# Patient Record
Sex: FEMALE | Race: WHITE | NOT HISPANIC OR LATINO | Employment: OTHER | URBAN - METROPOLITAN AREA
[De-identification: names, ages, dates, MRNs, and addresses within clinical notes are randomized per-mention and may not be internally consistent; named-entity substitution may affect disease eponyms.]

---

## 2017-03-08 ENCOUNTER — OFFICE VISIT - RIVER FALLS (OUTPATIENT)
Dept: FAMILY MEDICINE | Facility: CLINIC | Age: 67
End: 2017-03-08

## 2017-03-08 ASSESSMENT — MIFFLIN-ST. JEOR: SCORE: 1216.24

## 2017-07-11 ENCOUNTER — OFFICE VISIT - RIVER FALLS (OUTPATIENT)
Dept: FAMILY MEDICINE | Facility: CLINIC | Age: 67
End: 2017-07-11

## 2017-07-11 ASSESSMENT — MIFFLIN-ST. JEOR: SCORE: 1199.91

## 2017-07-12 LAB
CHOLEST SERPL-MCNC: 241 MG/DL (ref 125–200)
CHOLEST/HDLC SERPL: 3.8 {RATIO}
CREAT SERPL-MCNC: 1.07 MG/DL (ref 0.5–0.99)
GLUCOSE BLD-MCNC: 88 MG/DL (ref 65–99)
HDLC SERPL-MCNC: 64 MG/DL
LDLC SERPL CALC-MCNC: 149 MG/DL
NONHDLC SERPL-MCNC: 177 MG/DL
TRIGL SERPL-MCNC: 139 MG/DL

## 2017-07-18 ENCOUNTER — OFFICE VISIT - RIVER FALLS (OUTPATIENT)
Dept: FAMILY MEDICINE | Facility: CLINIC | Age: 67
End: 2017-07-18

## 2018-03-19 ENCOUNTER — OFFICE VISIT - RIVER FALLS (OUTPATIENT)
Dept: FAMILY MEDICINE | Facility: CLINIC | Age: 68
End: 2018-03-19

## 2018-03-19 ASSESSMENT — MIFFLIN-ST. JEOR: SCORE: 1241.06

## 2018-07-27 ENCOUNTER — AMBULATORY - RIVER FALLS (OUTPATIENT)
Dept: FAMILY MEDICINE | Facility: CLINIC | Age: 68
End: 2018-07-27

## 2018-07-28 LAB
CHOLEST SERPL-MCNC: 261 MG/DL
CHOLEST/HDLC SERPL: 3.5 {RATIO}
CREAT SERPL-MCNC: 1.04 MG/DL (ref 0.5–0.99)
GLUCOSE BLD-MCNC: 91 MG/DL (ref 65–99)
HDLC SERPL-MCNC: 75 MG/DL
LDLC SERPL CALC-MCNC: 164 MG/DL
NONHDLC SERPL-MCNC: 186 MG/DL
TRIGL SERPL-MCNC: 103 MG/DL

## 2018-12-17 ENCOUNTER — OFFICE VISIT - RIVER FALLS (OUTPATIENT)
Dept: FAMILY MEDICINE | Facility: CLINIC | Age: 68
End: 2018-12-17

## 2018-12-17 ASSESSMENT — MIFFLIN-ST. JEOR: SCORE: 1261.02

## 2019-03-20 ENCOUNTER — OFFICE VISIT - RIVER FALLS (OUTPATIENT)
Dept: FAMILY MEDICINE | Facility: CLINIC | Age: 69
End: 2019-03-20

## 2019-03-20 ASSESSMENT — MIFFLIN-ST. JEOR: SCORE: 1270.67

## 2019-03-21 LAB
BUN SERPL-MCNC: 19 MG/DL (ref 7–25)
BUN/CREAT RATIO - HISTORICAL: NORMAL (ref 6–22)
CALCIUM SERPL-MCNC: 9.5 MG/DL (ref 8.6–10.4)
CHLORIDE BLD-SCNC: 100 MMOL/L (ref 98–110)
CO2 SERPL-SCNC: 26 MMOL/L (ref 20–32)
CREAT SERPL-MCNC: 0.91 MG/DL (ref 0.5–0.99)
EGFRCR SERPLBLD CKD-EPI 2021: 65 ML/MIN/1.73M2
ERYTHROCYTE [DISTWIDTH] IN BLOOD BY AUTOMATED COUNT: 12.5 % (ref 11–15)
GLUCOSE BLD-MCNC: 92 MG/DL (ref 65–99)
HCT VFR BLD AUTO: 40.6 % (ref 35–45)
HGB BLD-MCNC: 13.7 GM/DL (ref 11.7–15.5)
MCH RBC QN AUTO: 30.4 PG (ref 27–33)
MCHC RBC AUTO-ENTMCNC: 33.7 GM/DL (ref 32–36)
MCV RBC AUTO: 90.2 FL (ref 80–100)
PLATELET # BLD AUTO: 355 10*3/UL (ref 140–400)
PMV BLD: 9.7 FL (ref 7.5–12.5)
POTASSIUM BLD-SCNC: 4.2 MMOL/L (ref 3.5–5.3)
RBC # BLD AUTO: 4.5 10*6/UL (ref 3.8–5.1)
SODIUM SERPL-SCNC: 135 MMOL/L (ref 135–146)
WBC # BLD AUTO: 6.7 10*3/UL (ref 3.8–10.8)

## 2019-07-01 ENCOUNTER — OFFICE VISIT - RIVER FALLS (OUTPATIENT)
Dept: FAMILY MEDICINE | Facility: CLINIC | Age: 69
End: 2019-07-01

## 2019-07-01 ASSESSMENT — MIFFLIN-ST. JEOR: SCORE: 1277.93

## 2019-07-03 ENCOUNTER — COMMUNICATION - RIVER FALLS (OUTPATIENT)
Dept: FAMILY MEDICINE | Facility: CLINIC | Age: 69
End: 2019-07-03

## 2019-11-15 ENCOUNTER — AMBULATORY - RIVER FALLS (OUTPATIENT)
Dept: FAMILY MEDICINE | Facility: CLINIC | Age: 69
End: 2019-11-15

## 2019-11-18 LAB
25(OH)D3 SERPL-MCNC: 45 NG/ML (ref 30–100)
BUN SERPL-MCNC: 15 MG/DL (ref 7–25)
BUN/CREAT RATIO - HISTORICAL: ABNORMAL (ref 6–22)
CALCIUM SERPL-MCNC: 9.6 MG/DL (ref 8.6–10.4)
CALCIUM SERPL-MCNC: 9.6 MG/DL (ref 8.6–10.4)
CHLORIDE BLD-SCNC: 103 MMOL/L (ref 98–110)
CO2 SERPL-SCNC: 28 MMOL/L (ref 20–32)
CREAT SERPL-MCNC: 0.99 MG/DL (ref 0.5–0.99)
EGFRCR SERPLBLD CKD-EPI 2021: 58 ML/MIN/1.73M2
GLUCOSE BLD-MCNC: 95 MG/DL (ref 65–99)
MAGNESIUM SERPL-MCNC: 1.9 MG/DL (ref 1.5–2.5)
PHOSPHATE SERPL-MCNC: 3.6 MG/DL (ref 2.1–4.3)
POTASSIUM BLD-SCNC: 4.4 MMOL/L (ref 3.5–5.3)
PTH-INTACT SERPL-MCNC: 44 PG/ML (ref 14–64)
SODIUM SERPL-SCNC: 140 MMOL/L (ref 135–146)

## 2020-06-17 ENCOUNTER — OFFICE VISIT - RIVER FALLS (OUTPATIENT)
Dept: FAMILY MEDICINE | Facility: CLINIC | Age: 70
End: 2020-06-17

## 2020-09-23 ENCOUNTER — COMMUNICATION - RIVER FALLS (OUTPATIENT)
Dept: FAMILY MEDICINE | Facility: CLINIC | Age: 70
End: 2020-09-23

## 2020-10-27 ENCOUNTER — COMMUNICATION - RIVER FALLS (OUTPATIENT)
Dept: FAMILY MEDICINE | Facility: CLINIC | Age: 70
End: 2020-10-27

## 2020-12-09 ENCOUNTER — COMMUNICATION - RIVER FALLS (OUTPATIENT)
Dept: FAMILY MEDICINE | Facility: CLINIC | Age: 70
End: 2020-12-09

## 2020-12-16 ENCOUNTER — OFFICE VISIT - RIVER FALLS (OUTPATIENT)
Dept: FAMILY MEDICINE | Facility: CLINIC | Age: 70
End: 2020-12-16

## 2020-12-16 ASSESSMENT — MIFFLIN-ST. JEOR: SCORE: 1287

## 2020-12-28 ENCOUNTER — COMMUNICATION - RIVER FALLS (OUTPATIENT)
Dept: FAMILY MEDICINE | Facility: CLINIC | Age: 70
End: 2020-12-28

## 2021-01-13 ENCOUNTER — COMMUNICATION - RIVER FALLS (OUTPATIENT)
Dept: FAMILY MEDICINE | Facility: CLINIC | Age: 71
End: 2021-01-13

## 2021-01-28 ENCOUNTER — COMMUNICATION - RIVER FALLS (OUTPATIENT)
Dept: FAMILY MEDICINE | Facility: CLINIC | Age: 71
End: 2021-01-28

## 2021-02-24 ENCOUNTER — COMMUNICATION - RIVER FALLS (OUTPATIENT)
Dept: FAMILY MEDICINE | Facility: CLINIC | Age: 71
End: 2021-02-24

## 2021-03-10 ENCOUNTER — COMMUNICATION - RIVER FALLS (OUTPATIENT)
Dept: FAMILY MEDICINE | Facility: CLINIC | Age: 71
End: 2021-03-10

## 2021-05-04 ENCOUNTER — COMMUNICATION - RIVER FALLS (OUTPATIENT)
Dept: FAMILY MEDICINE | Facility: CLINIC | Age: 71
End: 2021-05-04

## 2021-05-13 ENCOUNTER — OFFICE VISIT - RIVER FALLS (OUTPATIENT)
Dept: FAMILY MEDICINE | Facility: CLINIC | Age: 71
End: 2021-05-13

## 2021-05-13 ASSESSMENT — MIFFLIN-ST. JEOR: SCORE: 1277.93

## 2021-05-14 ENCOUNTER — COMMUNICATION - RIVER FALLS (OUTPATIENT)
Dept: FAMILY MEDICINE | Facility: CLINIC | Age: 71
End: 2021-05-14

## 2021-05-14 LAB
A/G RATIO - HISTORICAL: 1.5 (ref 1–2.5)
ALBUMIN SERPL-MCNC: 3.8 GM/DL (ref 3.6–5.1)
ALP SERPL-CCNC: 72 UNIT/L (ref 37–153)
ALT SERPL W P-5'-P-CCNC: 11 UNIT/L (ref 6–29)
AST SERPL W P-5'-P-CCNC: 19 UNIT/L (ref 10–35)
BILIRUB SERPL-MCNC: 0.7 MG/DL (ref 0.2–1.2)
BUN SERPL-MCNC: 18 MG/DL (ref 7–25)
BUN/CREAT RATIO - HISTORICAL: 16 (ref 6–22)
CALCIUM SERPL-MCNC: 9.5 MG/DL (ref 8.6–10.4)
CHLORIDE BLD-SCNC: 100 MMOL/L (ref 98–110)
CO2 SERPL-SCNC: 27 MMOL/L (ref 20–32)
CREAT SERPL-MCNC: 1.11 MG/DL (ref 0.6–0.93)
EGFRCR SERPLBLD CKD-EPI 2021: 50 ML/MIN/1.73M2
ERYTHROCYTE [DISTWIDTH] IN BLOOD BY AUTOMATED COUNT: 13.1 % (ref 11–15)
ERYTHROCYTE [SEDIMENTATION RATE] IN BLOOD BY WESTERGREN METHOD: 6 MM/H
GLOBULIN: 2.5 (ref 1.9–3.7)
GLUCOSE BLD-MCNC: 87 MG/DL (ref 65–99)
HCT VFR BLD AUTO: 40.9 % (ref 35–45)
HGB BLD-MCNC: 14 GM/DL (ref 11.7–15.5)
IRON SATURATION: 45 (ref 16–45)
IRON SERPL-MCNC: 133 MCG/DL (ref 45–160)
MCH RBC QN AUTO: 31.4 PG (ref 27–33)
MCHC RBC AUTO-ENTMCNC: 34.2 GM/DL (ref 32–36)
MCV RBC AUTO: 91.7 FL (ref 80–100)
PLATELET # BLD AUTO: 400 10*3/UL (ref 140–400)
PMV BLD: 8.9 FL (ref 7.5–12.5)
POTASSIUM BLD-SCNC: 4.4 MMOL/L (ref 3.5–5.3)
PROT SERPL-MCNC: 6.3 GM/DL (ref 6.1–8.1)
RBC # BLD AUTO: 4.46 10*6/UL (ref 3.8–5.1)
SODIUM SERPL-SCNC: 137 MMOL/L (ref 135–146)
TIBC - QUEST: 298 (ref 250–450)
TSH SERPL DL<=0.005 MIU/L-ACNC: 1.93 MIU/L (ref 0.4–4.5)
WBC # BLD AUTO: 7.7 10*3/UL (ref 3.8–10.8)

## 2021-06-03 ENCOUNTER — COMMUNICATION - RIVER FALLS (OUTPATIENT)
Dept: FAMILY MEDICINE | Facility: CLINIC | Age: 71
End: 2021-06-03

## 2021-07-01 ENCOUNTER — OFFICE VISIT - RIVER FALLS (OUTPATIENT)
Dept: FAMILY MEDICINE | Facility: CLINIC | Age: 71
End: 2021-07-01

## 2021-07-01 ENCOUNTER — TRANSFERRED RECORDS (OUTPATIENT)
Dept: MULTI SPECIALTY CLINIC | Facility: CLINIC | Age: 71
End: 2021-07-01
Payer: MEDICARE

## 2021-07-01 ASSESSMENT — MIFFLIN-ST. JEOR: SCORE: 1288.81

## 2021-08-18 ENCOUNTER — COMMUNICATION - RIVER FALLS (OUTPATIENT)
Dept: FAMILY MEDICINE | Facility: CLINIC | Age: 71
End: 2021-08-18

## 2022-02-09 ENCOUNTER — OFFICE VISIT - RIVER FALLS (OUTPATIENT)
Dept: FAMILY MEDICINE | Facility: CLINIC | Age: 72
End: 2022-02-09
Payer: MEDICARE

## 2022-02-10 ENCOUNTER — COMMUNICATION - RIVER FALLS (OUTPATIENT)
Dept: FAMILY MEDICINE | Facility: CLINIC | Age: 72
End: 2022-02-10
Payer: MEDICARE

## 2022-02-10 LAB
BASOPHILS # BLD MANUAL: 19 10*3/UL (ref 0–200)
BASOPHILS NFR BLD MANUAL: 0.3 %
BUN SERPL-MCNC: 18 MG/DL (ref 7–25)
BUN/CREAT RATIO - HISTORICAL: 16 (ref 6–22)
CALCIUM SERPL-MCNC: 10 MG/DL (ref 8.6–10.4)
CHLORIDE BLD-SCNC: 99 MMOL/L (ref 98–110)
CO2 SERPL-SCNC: 27 MMOL/L (ref 20–32)
CREAT SERPL-MCNC: 1.14 MG/DL (ref 0.6–0.93)
EGFRCR SERPLBLD CKD-EPI 2021: 48 ML/MIN/1.73M2
EOSINOPHIL # BLD MANUAL: 38 10*3/UL (ref 15–500)
EOSINOPHIL NFR BLD MANUAL: 0.6 %
ERYTHROCYTE [DISTWIDTH] IN BLOOD BY AUTOMATED COUNT: 12.8 % (ref 11–15)
GLUCOSE BLD-MCNC: 100 MG/DL (ref 65–99)
HCT VFR BLD AUTO: 41 % (ref 35–45)
HGB BLD-MCNC: 13.8 GM/DL (ref 11.7–15.5)
LYMPHOCYTES # BLD MANUAL: 2211 10*3/UL (ref 850–3900)
LYMPHOCYTES NFR BLD MANUAL: 35.1 %
MCH RBC QN AUTO: 31.2 PG (ref 27–33)
MCHC RBC AUTO-ENTMCNC: 33.7 GM/DL (ref 32–36)
MCV RBC AUTO: 92.6 FL (ref 80–100)
MONOCYTES # BLD MANUAL: 510 10*3/UL (ref 200–950)
MONOCYTES NFR BLD MANUAL: 8.1 %
NEUTROPHILS # BLD MANUAL: 3522 10*3/UL (ref 1500–7800)
NEUTROPHILS NFR BLD MANUAL: 55.9 %
PLATELET # BLD AUTO: 389 10*3/UL (ref 140–400)
PMV BLD: 9.2 FL (ref 7.5–12.5)
POTASSIUM BLD-SCNC: 3.9 MMOL/L (ref 3.5–5.3)
RBC # BLD AUTO: 4.43 10*6/UL (ref 3.8–5.1)
SODIUM SERPL-SCNC: 136 MMOL/L (ref 135–146)
WBC # BLD AUTO: 6.3 10*3/UL (ref 3.8–10.8)

## 2022-02-11 VITALS
HEART RATE: 68 BPM | HEIGHT: 61 IN | SYSTOLIC BLOOD PRESSURE: 138 MMHG | OXYGEN SATURATION: 100 % | BODY MASS INDEX: 34.36 KG/M2 | TEMPERATURE: 97.4 F | WEIGHT: 182 LBS | DIASTOLIC BLOOD PRESSURE: 75 MMHG

## 2022-02-11 VITALS
DIASTOLIC BLOOD PRESSURE: 72 MMHG | TEMPERATURE: 97.9 F | SYSTOLIC BLOOD PRESSURE: 130 MMHG | HEART RATE: 68 BPM | BODY MASS INDEX: 31.44 KG/M2 | WEIGHT: 166.4 LBS

## 2022-02-11 VITALS
HEART RATE: 66 BPM | DIASTOLIC BLOOD PRESSURE: 72 MMHG | HEIGHT: 61 IN | HEART RATE: 64 BPM | BODY MASS INDEX: 34.36 KG/M2 | HEIGHT: 61 IN | TEMPERATURE: 96.9 F | BODY MASS INDEX: 34.06 KG/M2 | WEIGHT: 180.4 LBS | DIASTOLIC BLOOD PRESSURE: 84 MMHG | SYSTOLIC BLOOD PRESSURE: 150 MMHG | TEMPERATURE: 98.7 F | WEIGHT: 182 LBS | SYSTOLIC BLOOD PRESSURE: 132 MMHG

## 2022-02-11 VITALS
DIASTOLIC BLOOD PRESSURE: 66 MMHG | HEIGHT: 61 IN | HEART RATE: 72 BPM | TEMPERATURE: 98.1 F | SYSTOLIC BLOOD PRESSURE: 122 MMHG | BODY MASS INDEX: 32.66 KG/M2 | WEIGHT: 173 LBS

## 2022-02-11 VITALS
SYSTOLIC BLOOD PRESSURE: 112 MMHG | BODY MASS INDEX: 31.11 KG/M2 | TEMPERATURE: 98.6 F | HEIGHT: 61 IN | HEART RATE: 67 BPM | OXYGEN SATURATION: 96 % | WEIGHT: 164.8 LBS | DIASTOLIC BLOOD PRESSURE: 74 MMHG

## 2022-02-11 VITALS
HEIGHT: 61 IN | WEIGHT: 168.4 LBS | TEMPERATURE: 97.3 F | BODY MASS INDEX: 31.79 KG/M2 | SYSTOLIC BLOOD PRESSURE: 134 MMHG | HEART RATE: 64 BPM | DIASTOLIC BLOOD PRESSURE: 78 MMHG

## 2022-02-11 VITALS
HEART RATE: 67 BPM | WEIGHT: 184.4 LBS | HEIGHT: 61 IN | SYSTOLIC BLOOD PRESSURE: 104 MMHG | DIASTOLIC BLOOD PRESSURE: 60 MMHG | RESPIRATION RATE: 16 BRPM | BODY MASS INDEX: 34.81 KG/M2 | OXYGEN SATURATION: 99 %

## 2022-02-11 VITALS
HEART RATE: 70 BPM | HEIGHT: 61 IN | TEMPERATURE: 97.6 F | BODY MASS INDEX: 33.49 KG/M2 | WEIGHT: 177.4 LBS | SYSTOLIC BLOOD PRESSURE: 120 MMHG | DIASTOLIC BLOOD PRESSURE: 70 MMHG

## 2022-02-11 VITALS — OXYGEN SATURATION: 95 % | DIASTOLIC BLOOD PRESSURE: 72 MMHG | HEART RATE: 66 BPM | SYSTOLIC BLOOD PRESSURE: 130 MMHG

## 2022-02-11 VITALS
OXYGEN SATURATION: 97 % | HEIGHT: 61 IN | SYSTOLIC BLOOD PRESSURE: 112 MMHG | TEMPERATURE: 97.9 F | BODY MASS INDEX: 34.74 KG/M2 | DIASTOLIC BLOOD PRESSURE: 60 MMHG | HEART RATE: 72 BPM | WEIGHT: 184 LBS

## 2022-02-14 ENCOUNTER — COMMUNICATION - RIVER FALLS (OUTPATIENT)
Dept: FAMILY MEDICINE | Facility: CLINIC | Age: 72
End: 2022-02-14
Payer: MEDICARE

## 2022-02-16 NOTE — NURSING NOTE
Comprehensive Intake Entered On:  6/20/2019 4:32 PM CDT    Performed On:  6/20/2019 4:32 PM CDT by Crissy Forrest CMA               Summary   Chief Complaint :   BP check    Advance Directive :   Yes   Menstrual Status :   Postmenopausal   Systolic Blood Pressure :   132 mmHg (HI)    Diastolic Blood Pressure :   72 mmHg   Mean Arterial Pressure :   92 mmHg   Race :      Languages :   English   Ethnicity :   Not  or    Crissy Forrest CMA - 6/20/2019 4:32 PM CDT

## 2022-02-16 NOTE — NURSING NOTE
Depression Screening Entered On:  12/16/2020 4:06 PM CST    Performed On:  12/16/2020 4:05 PM CST by Randi Finch CMA               Depression Screening   Little Interest - Pleasure in Activities :   Nearly every day   Feeling Down, Depressed, Hopeless :   More than half the days   Initial Depression Screen Score :   5 Score   Poor Appetite or Overeating :   Nearly every day   Trouble Falling or Staying Asleep :   Nearly every day   Feeling Tired or Little Energy :   Nearly every day   Feeling Bad About Yourself :   Nearly every day   Trouble Concentrating :   Several days   Moving or Speaking Slowly :   Not at all   Thoughts Better Off Dead or Hurting Self :   Not at all   MIGUELITO Difficulty with Work, Home, Others :   Very difficult   Detailed Depression Screen Score :   13    Total Depression Screen Score :   18    Randi Finch CMA - 12/16/2020 4:05 PM CST

## 2022-02-16 NOTE — TELEPHONE ENCOUNTER
---------------------  From: Bree Flores (Phone Messages Pool ([id number]_[place]))   To: [employer] Message Pool ([id number]_[facility][note 3]);     Sent: 12/9/2020 1:05:45 PM CST  Subject: FW: Prosthetic hand           ---------------------  From: DEANN KRISHNA  To: [facility]  Sent: 12/09/2020 12:56 p.m. CST  Subject: Prosthetic hand  Dr Blossom Greenfield    I saw the prosthetic tech yesterday in [place], [place]. He said my insurance requires a face-to-face visit with my regular Doctor to establish medical need for upper extremity prosthesis, then there is 11 questions you have to assess then fax them the medical record visit dictation (not a letter) that contains the assessment results and prescription. Having no hand didn t work. Do you think this is doable? Questions look dumb to me. Anyway, thoughts on this matter would be appreciated.  Thank you!!  Deann Hernandez can defiantly help with this, Deann. Just schedule an appointment and we will get this taken care of!    Randi---------------------  From: Randi Finch CMA ([employer] Message Pool ([id number]_[facility][note 3]))   To: DEANN KRISHNA    Sent: 12/9/2020 1:07:49 PM CST  Subject: RE: Prosthetic hand

## 2022-02-16 NOTE — TELEPHONE ENCOUNTER
---------------------  From: Celina Sun CMA (Phone Messages Pool ([id number]_[place]))   To: [employer] Message Pool ([id number]_[facility] [place]);     Sent: 6/3/2021 12:32:18 PM CDT  Subject: FW: Prescriptions     It appears sertraline was sent. Pt was not specific in initial request that she also needed hydroxyzine 25mg. Please advise if [employer] approves.    Last filled 5/13/21 #30 prn itching      ---------------------  From: MITCHELL KRISHNA  To: [facility]  Sent: 06/03/2021 12:21 p.m. CDT  Subject: Prescriptions  7 prescriptions were filled but 2 weren t. Sertraline and Hydroxyzine. Is there a problem with them?    Thank youCorrection: #40 sent on 5/13Hi Mitchell,     We did refill the Sertraline so you may need to double check with your pharmacy about that. As far as the Hydroxyzine-that is just an as needed medicine for your itching. Do you still feel like you need this? Is the itching any better?---------------------  From: Randi Finch CMA ([employer] Message Pool ([id number]_[facility] [place]))   To: MITCHELL KRISHNA    Sent: 6/3/2021 1:36:41 PM CDT  Subject: RE: Prescriptions

## 2022-02-16 NOTE — TELEPHONE ENCOUNTER
Order is sent to [facility] and they will contact patient.  Patient informed.

## 2022-02-16 NOTE — NURSING NOTE
Comprehensive Intake Entered On:  5/13/2021 2:08 PM CDT    Performed On:  5/13/2021 2:04 PM CDT by Randi Finch CMA               Summary   Chief Complaint :   1. Itching/all over body-prednisone did help. 2. Discuss vertigo and blurry vision.    Advance Directive :   Yes   Menstrual Status :   Postmenopausal   Weight Measured :   182 lb(Converted to: 182 lb 0 oz, 82.554 kg)    Height Measured :   61 in(Converted to: 5 ft 1 in, 154.94 cm)    Body Mass Index :   34.38 kg/m2 (HI)    Body Surface Area :   1.88 m2   Systolic Blood Pressure :   138 mmHg (HI)    Diastolic Blood Pressure :   75 mmHg   Mean Arterial Pressure :   96 mmHg   Peripheral Pulse Rate :   68 bpm   BP Site :   Right arm   BP Method :   Electronic   Temperature Tympanic :   97.4 DegF(Converted to: 36.3 DegC)  (LOW)    Oxygen Saturation :   100 %   Race :      Languages :   English   Ethnicity :   Not  or    Randi Finch CMA - 5/13/2021 2:04 PM CDT   Health Status   Allergies Verified? :   Yes   Medication History Verified? :   Yes   Immunizations Current :   Yes   Pre-Visit Planning Status :   Completed   Tobacco Use? :   Former smoker   Randi Finch CMA - 5/13/2021 2:04 PM CDT   Consents   Consent for Immunization Exchange :   Consent Granted   Consent for Immunizations to Providers :   Consent Granted   Randi Finch CMA - 5/13/2021 2:04 PM CDT   Meds / Allergies   (As Of: 5/13/2021 2:08:47 PM CDT)   Allergies (Active)   Dust  Estimated Onset Date:   Unspecified ; Created By:   Petrona Kaplan; Reaction Status:   Active ; Category:   Environment ; Substance:   Dust ; Type:   Allergy ; Updated By:   Petrona Kaplan; Reviewed Date:   1/5/2021 2:12 PM CST      Xarelto  Estimated Onset Date:   Unspecified ; Reactions:   Itching ; Created By:   Crystal Simeon LPN; Reaction Status:   Active ; Category:   Drug ; Substance:   Xarelto ; Type:   Allergy ; Updated By:   Crystal Simeon LPN; Reviewed Date:   12/16/2020 2:07 PM CST         Medication List   (As Of: 5/13/2021 2:08:47 PM CDT)   Prescription/Discharge Order    amLODIPine  :   amLODIPine ; Status:   Prescribed ; Ordered As Mnemonic:   amLODIPine 2.5 mg oral tablet ; Simple Display Line:   1 tab(s), Oral, daily, 90 tab(s), 3 Refill(s) ; Ordering Provider:   Blossom Ramirez MD; Catalog Code:   amLODIPine ; Order Dt/Tm:   6/17/2020 1:07:42 PM CDT          amoxicillin  :   amoxicillin ; Status:   Prescribed ; Ordered As Mnemonic:   amoxicillin 500 mg oral capsule ; Simple Display Line:   2,000 mg, 4 cap(s), Oral, once, given 1 hour prior to the procedure, 4 cap(s), 4 Refill(s) ; Ordering Provider:   Blossom Ramirez MD; Catalog Code:   amoxicillin ; Order Dt/Tm:   6/17/2020 1:11:53 PM CDT          calcium-vitamin D  :   calcium-vitamin D ; Status:   Prescribed ; Ordered As Mnemonic:   Calcium 600+D 600 mg-200 intl units oral tablet ; Simple Display Line:   1 tab(s), PO, qam, 90 tab(s) ; Ordering Provider:   Juli Cain MD; Catalog Code:   calcium-vitamin D ; Order Dt/Tm:   4/2/2014 5:52:48 PM CDT          cholecalciferol  :   cholecalciferol ; Status:   Prescribed ; Ordered As Mnemonic:   Vitamin D3 1000 intl units oral tablet ; Simple Display Line:   1,000 International Unit, 1 tab(s), po, daily, 90 tab(s) ; Ordering Provider:   Juli Cain MD; Catalog Code:   cholecalciferol ; Order Dt/Tm:   4/2/2014 5:52:11 PM CDT          gabapentin  :   gabapentin ; Status:   Prescribed ; Ordered As Mnemonic:   gabapentin 300 mg oral capsule ; Simple Display Line:   300 mg, 1 cap(s), Oral, hs, 90 cap(s), 3 Refill(s) ; Ordering Provider:   Blossom Ramirez MD; Catalog Code:   gabapentin ; Order Dt/Tm:   6/17/2020 1:11:09 PM CDT          hydroCHLOROthiazide  :   hydroCHLOROthiazide ; Status:   Prescribed ; Ordered As Mnemonic:   hydroCHLOROthiazide 25 mg oral tablet ; Simple Display Line:   1 tab(s), Oral, daily, 90 tab(s), 3 Refill(s) ; Ordering Provider:   Blossom Ramirez MD; Catalog Code:    hydroCHLOROthiazide ; Order Dt/Tm:   6/17/2020 1:07:49 PM CDT          losartan  :   losartan ; Status:   Prescribed ; Ordered As Mnemonic:   losartan 100 mg oral tablet ; Simple Display Line:   1 tab(s), Oral, daily, 90 tab(s), 3 Refill(s) ; Ordering Provider:   Blossom Ramirez MD; Catalog Code:   losartan ; Order Dt/Tm:   6/17/2020 1:07:43 PM CDT          meloxicam  :   meloxicam ; Status:   Prescribed ; Ordered As Mnemonic:   meloxicam 7.5 mg oral tablet ; Simple Display Line:   1 tab(s), Oral, daily, PRN: joint pain, 90 tab(s), 3 Refill(s) ; Ordering Provider:   Blossom Ramirez MD; Catalog Code:   meloxicam ; Order Dt/Tm:   6/17/2020 1:08:36 PM CDT          omeprazole  :   omeprazole ; Status:   Prescribed ; Ordered As Mnemonic:   omeprazole 20 mg oral delayed release capsule ; Simple Display Line:   20 mg, 1 cap(s), PO, Daily, 90 cap(s), 3 Refill(s) ; Ordering Provider:   Blossom Ramirez MD; Catalog Code:   omeprazole ; Order Dt/Tm:   6/17/2020 1:09:04 PM CDT          sertraline  :   sertraline ; Status:   Prescribed ; Ordered As Mnemonic:   sertraline 100 mg oral tablet ; Simple Display Line:   1 tab(s), Oral, daily, 90 tab(s), 3 Refill(s) ; Ordering Provider:   Blossom Ramirez MD; Catalog Code:   sertraline ; Order Dt/Tm:   6/17/2020 1:09:20 PM CDT          tiZANidine  :   tiZANidine ; Status:   Prescribed ; Ordered As Mnemonic:   tiZANidine 4 mg oral tablet ; Simple Display Line:   1-2 tabs, PO, q8hr, 540 tab(s), 0 Refill(s) ; Ordering Provider:   Kei Albarran PA-C; Catalog Code:   tiZANidine ; Order Dt/Tm:   3/16/2020 10:02:53 AM CDT          traZODone  :   traZODone ; Status:   Prescribed ; Ordered As Mnemonic:   traZODone 50 mg oral tablet ; Simple Display Line:   50 mg, 1 tab(s), po, hs, PRN: for sleep, 90 tab(s), 3 Refill(s) ; Ordering Provider:   Blossom Ramirez MD; Catalog Code:   traZODone ; Order Dt/Tm:   6/17/2020 1:09:41 PM CDT            ID Risk Screen   Recent Travel History :   No recent  travel   Family Member Travel History :   No recent travel   Other Exposure to Infectious Disease :   Unknown   COVID-19 Testing Status :   No positive COVID-19 test   Randi Finch CMA - 5/13/2021 2:04 PM CDT

## 2022-02-16 NOTE — NURSING NOTE
Generalized Anxiety Disorder Screening Entered On:  12/16/2020 4:05 PM CST    Performed On:  12/16/2020 4:05 PM CST by Randi Finch CMA               Generalized Anxiety Disorder Screening   MIGUELITO Nervous, Anxious On Edge :   Several days   MIGUELITO Control Worrying B :   Nearly every day   MIGUELITO Worrying Too Much :   Nearly every day   MIGUELITO Restless :   Not at all   MIGUELITO Easily Annoyed/Irritable :   Several days   MIGUELITO Afraid :   More than half the days   MIGUELITO Difficulty with Work, Home, Others :   Very difficult   Randi Finch CMA - 12/16/2020 4:05 PM CST

## 2022-02-16 NOTE — TELEPHONE ENCOUNTER
Entered by Blossom Ramirez MD on November 30, 2021 12:08:18 PM CST  ---------------------  From: Blossom Ramirez MD   To: [employer] [facility] #[id number]    Sent: 11/30/2021 12:08:18 PM CST  Subject: Medication Management     ** Submitted: **  Complete:losartan (losartan 100 mg oral tablet)   Signed by Blossom Ramirez MD  11/30/2021 6:08:00 PM UT    ** Submitted: **  Complete:omeprazole (omeprazole 20 mg oral delayed release capsule)   Signed by Blossom Ramirez MD  11/30/2021 6:08:00 PM UTC    ** Submitted: **  Complete:hydroCHLOROthiazide (hydroCHLOROthiazide 25 mg oral tablet)   Signed by Blossom Ramirez MD  11/30/2021 6:08:00 PM UTC    ** Submitted: **  Complete:hydroCHLOROthiazide (hydroCHLOROthiazide 25 mg oral tablet)   Signed by Blossom Ramirez MD  11/30/2021 6:08:00 PM UT    ** Submitted: **  Complete:amLODIPine (amLODIPine 2.5 mg oral tablet)   Signed by Blossom Ramirez MD  11/30/2021 6:08:00 PM UTC    ** Submitted: **  Complete:omeprazole (omeprazole 20 mg oral delayed release capsule)   Signed by Blossom Ramirez MD  11/30/2021 6:08:00 PM UTC    ** Submitted: **  Complete:losartan (losartan 100 mg oral tablet)   Signed by Blossom Ramirez MD  11/30/2021 6:08:00 PM UTC    ** Submitted: **  Complete:amLODIPine (amLODIPine 2.5 mg oral tablet)   Signed by Blossom Ramirez MD  11/30/2021 6:08:00 PM UTC    ** Approved with modifications: **  omeprazole (OMEPRAZOLE 20MG CAPSULES)  TAKE 1 CAPSULE BY MOUTH DAILY  Qty:  90 cap(s)        Days Supply:  90        Refills:  1          Substitutions Allowed     Route To Pharmacy - [facility] #[id number]       ** Approved with modifications: **  amLODIPine (AMLODIPINE BESYLATE 2.5MG TABLETS)  TAKE 1 TABLET BY MOUTH DAILY  Qty:  90 tab(s)        Days Supply:  90        Refills:  1          Substitutions Allowed     Route To Pharmacy - [id number]       ** Approved with modifications: **  hydroCHLOROthiazide (HYDROCHLOROTHIAZIDE 25MG TABLETS)   TAKE 1 TABLET BY MOUTH DAILY  Qty:  90 tab(s)        Days Supply:  90        Refills:  1          Substitutions Allowed     [id number]       ** Approved with modifications: **  losartan (LOSARTAN 100MG TABLETS)  TAKE 1 TABLET BY MOUTH DAILY  Qty:  90 tab(s)        Days Supply:  90        Refills:  1          Substitutions Allowed     Route To [id number]               ------------------------------------------  From: [id number]  To: Blossom Ramirez MD  Sent: November 22, 2021 12:39:48 PM CST  Subject: Medication Management  Due: November 16, 2021 3:37:48 PM CST     ** On Hold Pending Signature **     Dispensed Drug: omeprazole (omeprazole 20 mg oral delayed release capsule), TAKE 1 CAPSULE BY MOUTH DAILY  Quantity: 90 cap(s)  Days Supply: 90  Refills: 0  Substitutions Allowed  Notes from Pharmacy:     ** On Hold Pending Signature **     Dispensed Drug: amLODIPine (amLODIPine 2.5 mg oral tablet), TAKE 1 TABLET BY MOUTH DAILY  Quantity: 90 tab(s)  Days Supply: 90  Refills: 0  Substitutions Allowed  Notes from Pharmacy:     ** On Hold Pending Signature **     Dispensed Drug: hydroCHLOROthiazide (hydroCHLOROthiazide 25 mg oral tablet), TAKE 1 TABLET BY MOUTH DAILY  Quantity: 90 tab(s)  Days Supply: 90  Refills: 0  Substitutions Allowed  Notes from Pharmacy:     ** On Hold Pending Signature **     Dispensed Drug: losartan (losartan 100 mg oral tablet), TAKE 1 TABLET BY MOUTH DAILY  Quantity: 90 tab(s)  Days Supply: 90  Refills: 0  Substitutions Allowed  Notes from Pharmacy:  ------------------------------------------

## 2022-02-16 NOTE — TELEPHONE ENCOUNTER
---------------------  From: Lyudmila Rivas MA (Phone Messages Pool ([id number]_[facility]))   To: DEANN KRISHNA    Sent: 1/28/2021 4:42:28 PM CST  Subject: RE: Covid vaccination     Hi Deann,    As of right now, we don't have the COVID vaccine and it may be another couple of weeks before we precisely know when we will be getting the vaccine.  When we do get the vaccine we will not be placing patients on a waiting list and will start giving the vaccine to those over 75 years old after they have been contacted.  For now, we are asking patients to log into our website at [email or web address] or call [phone] option 3 to get more COVID information.  You can also check with your local public health officials or call your pharmacy to see if they have the COVID vaccine available, and if they do, ask to get on their schedule to be vaccinated.    Sincerely,     Lyudmila PICHARDO CMA      ---------------------  From: DEANN KRISHNA  To: [facility]  Sent: 01/28/2021 04:32 p.m. CST  Subject: Covid vaccination  Hi. I was wondering when the vaccine will be available for the general public and where/when should I get mine?    Thank you.  Deann Krishna
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
## 2022-02-16 NOTE — TELEPHONE ENCOUNTER
---------------------  From: Randi Finch CMA ([employer] Message Pool ([id number]_[facility]))   To: Ashley MELENDREZ [facility];     Sent: 6/3/2021 1:51:32 PM CDT  Subject: FW: Prescriptions     ok to refill Hydroxyzine?       ---------------------  From: DEANN KRISHNA  To: [employer] Message Pool ([address])  Sent: 06/03/2021 01:46 p.m. CDT  Subject: RE: Prescriptions  The meds were helping with the itching. I ll check with pharmacy on Sertraline. Please refill. Thank you       Addendum by Randi Finch CMA on June 03, 2021 1:36:41 PM CDT  ---------------------  From: Randi Finch CMA ([employer] Message Pool ([address]))  To: DEANN KRISHNA  Sent: 6/3/2021 1:36:41 PM CDT  Subject: RE: Prescriptions       Addendum by Randi Finch CMA on June 03, 2021 1:36:26 PM CDT  Hi Deann,       We did refill the Sertraline so you may need to double check with your pharmacy about that. As far as the Hydroxyzine-that is just an as needed medicine for your itching. Do you still feel like you need this? Is the itching any better?       Addendum by Celina Sun CMA on June 03, 2021 12:55 PM CDT  Correction: #40 sent on 5/13  ---------------------  From: Celina Sun CMA (Phone Messages Pool ([id number]_[facility]))  To: [employer] Message Pool (08524_[facility]);  Sent: 6/3/2021 12:32:18 PM CDT  Subject: FW: Prescriptions       It appears sertraline was sent. Pt was not specific in initial request that she also needed hydroxyzine 25mg. Please advise if KWROGER approves.       Last filled 5/13/21 #30 prn itching            ---------------------  From: DEANN KRISHNA  [facility]  Sent: 06/03/2021 12:21 p.m. CDT  Subject: Prescriptions  7 prescriptions were filled but 2 weren t. Sertraline and Hydroxyzine. Is there a problem with them?  Thank you  ** Submitted: **  Order:hydrOXYzine (hydrOXYzine hydrochloride 25 mg oral tablet)  1 tab(s)  Oral  qid  Qty:  40 tab(s)        Refills:  1          Substitutions Allowed      PRN  for itching      Route To Pharmacy - [facility] #[id number]    Signed by Blossom Ramirez MD  6/3/2021 6:52:00 PM UTCYes OK. I had refilled all the ones the pharmacy sent a request for, so they may not have realized she also wanted the hydroxyzine refilled. Done. Thanks---------------------  From: Blossom Ramirez MD   To: [employer] (32224[facility]);     Sent: 6/3/2021 1:53:35 PM CDT  Subject: RE: PrescriptionsHi Deann,     We sent in the Hydroxyzine for you.    We will plan to see you in Dec for your annual med check and physical.    Thanks!    Randi---------------------  From: Randi Finch CMA ([employer] Pool (32224_[facility]))   To: DEANN KRISHNA    Sent: 6/3/2021 1:54:36 PM CDT  Subject: RE: Prescriptions

## 2022-02-16 NOTE — TELEPHONE ENCOUNTER
---------------------  From: Celina Sun CMA (Phone Messages Pool (26209_[place] [facility] [place]))   To: [employer] Message Pool (20965_[facility]); DEANN KRISHNA    Sent: 6/3/2021 10:34:31 AM CDT  Subject: RE: Prescriptions     Please verify if YESENIA wants to see pt again for CDM visit or if visit on 5/13/21 was okay. Labs done at that visit - except lipid panel. Appears all chronic meds are due for refills.     ---------------------  From: DEANN KRISHNA  To: [facility]  Sent: 06/03/2021 10:13 a.m. CDT  Subject: Prescriptions  Being I was just in May 13th for labs do I need to see Dr to renew my prescriptions? I do not need the sleep aid or the muscle relaxer anymore. The one for itching seems to help. Please let me know.    Thank you  Deann Krishna---------------------  From: Randi Finch CMA ([employer] Message Pool (05964_[facility]))   To: Blossom Ramirez MD;     Sent: 6/3/2021 10:35:40 AM CDT  Subject: FW: PrescriptionsOK x 6 months. Recommend AWV and chronic disease follow up at that time.---------------------  From: Blossom Ramirez MD   To: [employer] (65568_[facility]);     Sent: 6/3/2021 11:14:08 AM CDT  Subject: RE: Prescriptions
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
## 2022-02-16 NOTE — TELEPHONE ENCOUNTER
---------------------  From: Jenny Cruz LPN ([employer] Message Pool ([address]224[facility]))   To: Ashley MELENDREZ [facility];     Sent: 5/10/2021 11:50:00 AM CDT  Subject: FW: CONSUMER MESSAGE FW: Itching           ---------------------  From: DEANN KRISHNA  To: [employer] Message Pool ([address])  Sent: 05/10/2021 11:39 a.m. CDT  Subject: RE: CONSUMER MESSAGE FW: Itching  Hi  The prednisone seems to help some with the itching. I m already fat and don t want to gain weight using this ??I have 1 pill left. Would DR Ramirez have any other ideas? I ve tried oatmeal baths, OTC pills, lotions. I m on a lot of medications do any of them cause itching? I m also having some vertigo issues especially in the morning. I m falling apart it may be old age????? Help Thanks! Deann       Addendum by Randi Finch CMA on May 04, 2021 3:04:09 PM CDT  ---------------------  From: Randi Finch CMA ([employer] Message Pool ([address]224[facility]))  To: DEANN KRISHNA  Sent: 5/4/2021 3:04:09 PM CDT  Subject: RE: CONSUMER MESSAGE FW: Itching  Entered by Randi Finch CMA on May 04, 2021 3:04:05 PM CDT  Modesto Zacarias,       I will send it right in for you!       Let us know how you are doing next week.       Randi       ---------------------  From: DEANN KRISHNA  To: [employer] Message Pool ([address])  Sent: 05/04/2021 02:58 p.m. CDT  Subject: RE: CONSUMER MESSAGE FW: Itching  Is that prescription going to be called in today? I have tried OTC antihistamines. No luck there. I hope I can  today. Thank you.  I don t know if my other replies went through.       Addendum by Randi Finch CMA on May 04, 2021 1:49:11 PM CDT  ---------------------  From: Randi Finch CMA ([employer] Message Pool (32224_[facility]))  To: DEANN KRISHNA  Sent: 5/4/2021 1:49:11 PM CDT  Subject: RE: CONSUMER MESSAGE FW: Itching       Addendum by Randi Finch CMA on May 04, 2021 1:49:05 PM CDT  Modesto Zacarias,       I asked Kei Albarran one of our PA s that works closely  with Dr. Iglesias. He said to try an over the counter antihistamine if you havent already. If you want, he said he could send in Prednisone to see if that helps until we can discuss this with Dr. Iglesias.       Let me know,       Randi       Addendum by Kei Albarran PA-C on May 04, 2021 12:31:36 PM CDT  ---------------------  From: Kei Albarran PA-C  To: [facility] Message Pool ([id number]);  Sent: 5/4/2021 12:31:36 PM CDT  Subject: RE: CONSUMER MESSAGE FW: Itching       Tried to call NA. Mailbox is full. She could try OTC antihistamine if not already on that. If she wants, prednisone 20 mg once daily x one week until [facility] is back.       KAH       Addendum by Randi Finch CMA on May 04, 2021 11:42:04 AM CDT  ---------------------  From: Randi Finch CMA ([facility] Message Pool ([address]2824[facility]))  To: Kei Albarran PA-C;  Sent: 5/4/2021 11:42:04 AM CDT  Subject: FW: CONSUMER MESSAGE FW: Itching       Addendum by Randi Finch CMA on May 04, 2021 11:41:58 AM CDT  Are you willing to address?  ---------------------  From: Debora Riggs LPN (Phone Messages Pool ([facility]24[facility]))  To: [facility] Message Pool ([id number]);  Sent: 5/4/2021 11:10:54 AM CDT  Subject: FW: CONSUMER MESSAGE FW: Itching                      ---------------------  From: MITCHELL KRISHNA  To: [facility]  Sent: 05/04/2021 11:06 a.m. CDT  Subject: RE: CONSUMER MESSAGE FW: Itching  I guess I can wait till next week.  Thank you.  ---------------------  From: Debora Riggs LPN (Phone Messages Pool ([address]_[facility]))  To: [facility] Message Pool (32224_[facility]); MITCHELL KRISHNA  Sent: 5/4/2021 8:19:37 AM CDT  Subject: CONSUMER MESSAGE FW: Itching       Hi Dr. Ashley Zacarias is out of clinic this week. I can forward your message to her for next week or you can schedule an appointment with another provider if you would like to discuss sooner.       Thanks,  Debora FITCH LPN                  ---------------------  From: DEANN KRISHNA  To: [facility]  Sent: 05/03/2021 10:04 p.m. CDT  Subject: Itching  I m itching so bad every day. I think I might have some allergies. In the morning My eyes itch, and burn, my nose runs. My whole body itches especially at night.My dermatologist knows this but doesn t do anything. Says to buy lotions. I ve tried every lotion I can think of. Baking soda baths oatmeal baths. I only use Ivory soap. Help! Please!  Deann Ram,  Have you tried Zyrtec over the counter? If you have tried that and symptoms are ongoing, I think you'll need to have a visit. We can do some lab testing to look for causes of itching and then discuss next steps.  Fátima Ramirez---------------------  From: Blossom Ramirez MD   To: [employer] Pool ([address]_[facility]); DEANN KRISHNA    Sent: 5/10/2021 12:15:14 PM CDT  Subject: RE: CONSUMER MESSAGE FW: Itching---------------------  From: Jenny Cruz LPN ([employer] Pool (32[address]_[facility]))   To: DEANN KRISHNA    Sent: 5/10/2021 12:28:26 PM CDT  Subject: FW: CONSUMER MESSAGE FW: Itching

## 2022-02-16 NOTE — TELEPHONE ENCOUNTER
---------------------  From: Stephanie Saenz MA ([employer] Pool (32[address]_[place] [facility] [place]))   To: Blossom Ramirez MD;     Sent: 2/5/2020 8:09:31 AM CST  Subject: FW: Medication Management   Due Date/Time: 2/6/2020 5:01:00 AM CST     PCP:   YESENIA    Medication:   Meloxicam  Last Filled:  8/9/19    Quantity:  90  Refills:  1  Medication:   Tizanidine  Last Filled:  3/20/19    Quantity:  30  Refills:  3    Date of last office visit and reason:   7/1/19  Date of last labs pertaining to condition:  n/a    Note:  Patient due for AWV 3/20/2020    Return to Clinic order placed?  Yes            ------------------------------------------  From: [facility] #[id number]  To: Blossom Ramirez MD  Sent: February 5, 2020 5:01:30 AM CST  Subject: Medication Management  Due: February 6, 2020 5:01:30 AM CST    ** On Hold Pending Signature **  Drug: meloxicam (meloxicam 7.5 mg oral tablet)  TAKE 1 TABLET BY MOUTH DAILY  Quantity: 90 tab(s)  Days Supply: 90  Refills: 0  Substitutions Allowed  Notes from Pharmacy:     Dispensed Drug: meloxicam (meloxicam 7.5 mg oral tablet)  TAKE 1 TABLET BY MOUTH DAILY  Quantity: 90 tab(s)  Days Supply: 90  Refills: 0  Substitutions Allowed  Notes from Pharmacy:   ---------------------------------------------------------------  From: Blossom Ramirez MD   To: [facility] #[id number]    Sent: 2/5/2020 8:13:06 AM CST  Subject: FW: Medication Management     ** Submitted: **  Complete:meloxicam (meloxicam 7.5 mg oral tablet)   Signed by Blossom Ramirez MD  2/5/2020 8:13:00 AM    ** Approved with modifications: **  meloxicam (MELOXICAM 7.5MG TABLETS)  TAKE 1 TABLET BY MOUTH DAILY  Qty:  90 tab(s)        Days Supply:  90        Refills:  1          Substitutions Allowed     Route To Pharmacy - [facility] #[id number]

## 2022-02-16 NOTE — TELEPHONE ENCOUNTER
---------------------  From: Debora Riggs LPN (Phone Messages Pool ([address] [place] [address]))   To: [facility] Message Pool ([address] [place] [address]);     Sent: 9/23/2020 4:38:21 PM CDT  Subject: CONSUMER MESSAGE FW: blood tests     labs scanned in to chart 8/13        ---------------------  From: DEANN KRISHNA  To: [facility]  Sent: 09/23/2020 04:15 p.m. CDT  Subject: blood tests  Dr Ramirez,  Did you get the copy of the blood tests I brought to the [facility]? [facility] ordered  them and I thought that they would work for my yearly checkup. They karen 9 vials.    Thank you!!  Deann Krishna  [id number]---------------------  From: Randi Finch CMA ([facility] Message Pool (32[address]))   To: Blossom Ramirez MD;     Sent: 9/23/2020 4:49:01 PM CDT  Subject: FW: CONSUMER MESSAGE FW: blood testsSharon,   The only lab I see missing that we had discussed was a cholesterol panel. If you would like to get that done prior to your annual exam, let me know. Otherwise, we can review the rest of the labs at that time.  Fátima Ramirez---------------------  From: Blossom Ramirez MD   To: [facility] Message Pool ([address]); DEANN KRISHNA    Sent: 9/23/2020 5:04:08 PM CDT  Subject: RE: CONSUMER MESSAGE FW: blood tests

## 2022-02-16 NOTE — TELEPHONE ENCOUNTER
Entered by Stephanie Saenz MA on February 24, 2020 1:10:11 PM CST  ---------------------  From: Stephanie Saezn MA   To: [facility] #[id number]    Sent: 2/24/2020 1:10:11 PM CST  Subject: Medication Management     ** Not Approved: Refill not appropriate, Filled 2/24/20 for 30 day protocol. Will fill for 90 at appointment **  hydroCHLOROthiazide (HYDROCHLOROTHIAZIDE 25MG TABLETS)  TAKE 1 TABLET BY MOUTH DAILY  Qty:  90 tab(s)        Days Supply:  30        Refills:  0          Substitutions Allowed     Route To [facility] #[id number]   Note from Pharmacy:  **Patient requests 90 days supply**  Signed by Stephanie Saenz MA    ** Not Approved: Refill not appropriate, Filled 2/24/20 for 30 day protocol. Will fill for 90 at appointment **  losartan (LOSARTAN 100MG TABLETS)  TAKE 1 TABLET BY MOUTH DAILY  Qty:  90 tab(s)        Days Supply:  30        Refills:  0          Substitutions Allowed     Route To [facility] #[id number]   Note from Pharmacy:  **Patient requests 90 days supply**  Signed by Stephanie Saenz MA    ** Not Approved: Refill not appropriate, Filled 2/24/20 for 30 day protocol. Will fill for 90 at appointment **  amLODIPine (AMLODIPINE BESYLATE 2.5MG TABLETS)  TAKE 1 TABLET BY MOUTH EVERY DAY  Qty:  90 tab(s)        Days Supply:  30        Refills:  0          Substitutions Allowed     Route To [facility] #[id number]   Note from Pharmacy:  **Patient requests 90 days supply**  Signed by Stephanie Saenz MA            ------------------------------------------  From: [facility] #[id number]  To: Blossom Ramirez MD  Sent: February 24, 2020 11:46:32 AM CST  Subject: Medication Management  Due: February 25, 2020 11:46:32 AM CST    ** On Hold Pending Signature **  Drug: hydroCHLOROthiazide (hydroCHLOROthiazide 25 mg oral tablet)  TAKE 1 TABLET BY MOUTH DAILY  Quantity: 90 tab(s)  Days Supply: 30  Refills: 0  Substitutions Allowed  Notes from Pharmacy:  **Patient requests 90 days supply**    Dispensed Drug: hydroCHLOROthiazide (hydroCHLOROthiazide 25 mg oral tablet)  TAKE 1 TABLET BY MOUTH DAILY  Quantity: 90 tab(s)  Days Supply: 30  Refills: 0  Substitutions Allowed  Notes from Pharmacy: **Patient requests 90 days supply**    ** On Hold Pending Signature **  Drug: losartan (losartan 100 mg oral tablet)  TAKE 1 TABLET BY MOUTH DAILY  Quantity: 90 tab(s)  Days Supply: 30  Refills: 0  Substitutions Allowed  Notes from Pharmacy: **Patient requests 90 days supply**    Dispensed Drug: losartan (losartan 100 mg oral tablet)  TAKE 1 TABLET BY MOUTH DAILY  Quantity: 90 tab(s)  Days Supply: 30  Refills: 0  Substitutions Allowed  Notes from Pharmacy: **Patient requests 90 days supply**    ** On Hold Pending Signature **  Drug: amLODIPine (amLODIPine 2.5 mg oral tablet)  TAKE 1 TABLET BY MOUTH EVERY DAY  Quantity: 90 tab(s)  Days Supply: 30  Refills: 0  Substitutions Allowed  Notes from Pharmacy: **Patient requests 90 days supply**    Dispensed Drug: amLODIPine (amLODIPine 2.5 mg oral tablet)  TAKE 1 TABLET BY MOUTH EVERY DAY  Quantity: 90 tab(s)  Days Supply: 30  Refills: 0  Substitutions Allowed  Notes from Pharmacy: **Patient requests 90 days supply**  ------------------------------------------
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
## 2022-02-16 NOTE — TELEPHONE ENCOUNTER
Entered by Crissy Forrest CMA on February 24, 2020 11:45:57 AM CST  ---------------------  From: Crissy Forrest CMA   To: [facility] #[id number]    Sent: 2/24/2020 11:45:56 AM CST  Subject: Medication Management     ** Submitted: **  Order:sertraline (sertraline 100 mg oral tablet)  1 tab(s)  Oral  daily  Qty:  30 tab(s)        Refills:  0          Substitutions Allowed     Route To [facility] #[id number]    Signed by Crissy Forrest CMA  2/24/2020 11:45:00 AM    ** Submitted: **  Complete:sertraline (sertraline 100 mg oral tablet)   Signed by Crissy Forrest CMA  2/24/2020 11:45:00 AM    ** Not Approved:  **  sertraline (SERTRALINE 100MG TABLETS)  TAKE 1 TABLET BY MOUTH DAILY  Qty:  90 tab(s)        Days Supply:  90        Refills:  0          Substitutions Allowed     Route To [facility] #[id number]   Signed by Crissy Forrest CMA            ** Submitted: **  Order:losartan (losartan 100 mg oral tablet)  1 tab(s)  Oral  daily  Qty:  30 tab(s)        Refills:  0          Substitutions Allowed     Route To [facility] #[id number]    Signed by Crissy Forrest CMA  2/24/2020 11:45:00 AM    ** Submitted: **  Complete:losartan (losartan 100 mg oral tablet)   Signed by Crissy Forrest CMA  2/24/2020 11:45:00 AM    ** Not Approved:  **  losartan (LOSARTAN 100MG TABLETS)  TAKE 1 TABLET BY MOUTH DAILY  Qty:  90 tab(s)        Days Supply:  90        Refills:  0          Substitutions Allowed     Route To [facility] #[id number]   Signed by Crissy Forrest CMA            ** Submitted: **  Order:hydroCHLOROthiazide (hydroCHLOROthiazide 25 mg oral tablet)  1 tab(s)  Oral  daily  Qty:  30 tab(s)        Refills:  0          Substitutions Allowed     Route To [facility] #[id number]    Signed by Crissy Forrest CMA  2/24/2020 11:45:00 AM    ** Submitted: **  Complete:hydroCHLOROthiazide (hydroCHLOROthiazide 25 mg oral tablet)   Signed by Crissy Forrest CMA  2/24/2020  11:45:00 AM    ** Not Approved:  **  hydroCHLOROthiazide (HYDROCHLOROTHIAZIDE 25MG TABLETS)  TAKE 1 TABLET BY MOUTH DAILY  Qty:  90 tab(s)        Days Supply:  90        Refills:  0          Substitutions Allowed     Route To [facility] #[id number]   Signed by Crissy Forrest CMA            ** Submitted: **  Order:amLODIPine (amLODIPine 2.5 mg oral tablet)  1 tab(s)  Oral  daily  Qty:  30 tab(s)        Refills:  0          Substitutions Allowed     Route To [facility] #[id number]    Signed by Crissy Forrest CMA  2/24/2020 11:44:00 AM    ** Submitted: **  Complete:amLODIPine (amLODIPine 2.5 mg oral tablet)   Signed by Crissy Forrest CMA  2/24/2020 11:45:00 AM    ** Not Approved:  **  amLODIPine (AMLODIPINE BESYLATE 2.5MG TABLETS)  TAKE 1 TABLET BY MOUTH EVERY DAY  Qty:  90 tab(s)        Days Supply:  90        Refills:  0          Substitutions Allowed     Route To [facility] #[id number]   Signed by Crissy Forrest CMA            ------------------------------------------  From: [facility] #[id number]  To: Blossom Ramirez MD  Sent: February 24, 2020 5:01:54 AM CST  Subject: Medication Management  Due: February 25, 2020 5:01:54 AM CST    ** On Hold Pending Signature **  Drug: sertraline (sertraline 100 mg oral tablet)  TAKE 1 TABLET BY MOUTH DAILY  Quantity: 90 tab(s)  Days Supply: 90  Refills: 0  Substitutions Allowed  Notes from Pharmacy:     Dispensed Drug: sertraline (sertraline 100 mg oral tablet)  TAKE 1 TABLET BY MOUTH DAILY  Quantity: 90 tab(s)  Days Supply: 90  Refills: 0  Substitutions Allowed  Notes from Pharmacy:     ** On Hold Pending Signature **  Drug: amLODIPine (amLODIPine 2.5 mg oral tablet)  TAKE 1 TABLET BY MOUTH EVERY DAY  Quantity: 90 tab(s)  Days Supply: 90  Refills: 0  Substitutions Allowed  Notes from Pharmacy:     Dispensed Drug: amLODIPine (amLODIPine 2.5 mg oral tablet)  TAKE 1 TABLET BY MOUTH EVERY DAY  Quantity: 90 tab(s)  Days Supply:  90  Refills: 0  Substitutions Allowed  Notes from Pharmacy:     ** On Hold Pending Signature **  Drug: hydroCHLOROthiazide (hydroCHLOROthiazide 25 mg oral tablet)  TAKE 1 TABLET BY MOUTH DAILY  Quantity: 90 tab(s)  Days Supply: 90  Refills: 0  Substitutions Allowed  Notes from Pharmacy:     Dispensed Drug: hydroCHLOROthiazide (hydroCHLOROthiazide 25 mg oral tablet)  TAKE 1 TABLET BY MOUTH DAILY  Quantity: 90 tab(s)  Days Supply: 90  Refills: 0  Substitutions Allowed  Notes from Pharmacy:     ** On Hold Pending Signature **  Drug: losartan (losartan 100 mg oral tablet)  TAKE 1 TABLET BY MOUTH DAILY  Quantity: 90 tab(s)  Days Supply: 90  Refills: 0  Substitutions Allowed  Notes from Pharmacy:     Dispensed Drug: losartan (losartan 100 mg oral tablet)  TAKE 1 TABLET BY MOUTH DAILY  Quantity: 90 tab(s)  Days Supply: 90  Refills: 0  Substitutions Allowed  Notes from Pharmacy:   ------------------------------------------Date of last office visit and reason:  7/1/19 Headache      Date of last Med Check / Px:   3/20/2019  Date of last labs pertaining to med:      RTC order in chart:  yes due in March    For Protocol refill, has patient been contacted:  yes called to remind patient that she is due to see K[facility] for her AWV in March. Refilled for 30 days as she only has 2 weeks of medications left. Transferred to make an appt.
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
## 2022-02-16 NOTE — NURSING NOTE
CAGE Assessment Entered On:  12/16/2020 4:06 PM CST    Performed On:  12/16/2020 4:06 PM CST by Randi Finch CMA               Assessment   Have you ever felt you should cut down on your drinking :   No   Have people annoyed you by criticizing your drinking :   No   Have you ever felt bad or guilty about your drinking :   No   Have you ever taken a drink first thing in the morning to steady your nerves or get rid of a hangover (Eye-opener) :   No   CAGE Score :   0    Randi Finch CMA - 12/16/2020 4:06 PM CST
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
## 2022-02-16 NOTE — TELEPHONE ENCOUNTER
---------------------  From: Debora Riggs LPN ROGER (Phone Messages Pool ([id number]))   To: [place] Pool ([id number]);     Sent: 5/4/2021 11:10:54 AM CDT  Subject: FW: CONSUMER MESSAGE FW: Itching           ---------------------  From: DEANN KRISHNA  To: [facility]  Sent: 05/04/2021 11:06 a.m. CDT  Subject: RE: CONSUMER MESSAGE FW: Itching  I guess I can wait till next week.    Thank you.  ---------------------  From: Debora Riggs LPN (Phone Messages Pool (82447[facility]))  To: [employer] Message Pool ([id number]); DEANN KRISHNA  Sent: 5/4/2021 8:19:37 AM CDT  Subject: CONSUMER MESSAGE FW: Itching       Dr. Ashley Ferguson is out of clinic this week. I can forward your message to her for next week or you can schedule an appointment with another provider if you would like to discuss sooner.       Thanks,  Debora FITCH LPN                 ---------------------  From: DEANN KRISHNA  To: [facility]  Sent: 05/03/2021 10:04 p.m. CDT  Subject: Itching  I m itching so bad every day. I think I might have some allergies. In the morning My eyes itch, and burn, my nose runs. My whole body itches especially at night.My dermatologist knows this but doesn t do anything. Says to buy lotions. I ve tried every lotion I can think of. Baking soda baths oatmeal baths. I only use Ivory soap. Help! Please!  Deann Stafford you willing to address?---------------------  From: Randi Finch CMA ([employer] Message Pool ([id number]))   To: Kei Albarran PA-C;     Sent: 5/4/2021 11:42:04 AM CDT  Subject: FW: CONSUMER MESSAGE FW: Itching---------------------  From: Kei Albarran PA-C   To: KW Message Pool ([id number]_[facility]);     Sent: 5/4/2021 12:31:36 PM CDT  Subject: RE: CONSUMER MESSAGE FW: Itching     Tried to call NA. Mailbox is full. She could try OTC antihistamine if not already on that. If she wants, prednisone 20 mg once daily x one week until K[facility] is  back.    Elaina Zacarias,     I asked Kei Albarran one of our PA's that works closely with Dr. Iglesias. He said to try an over the counter antihistamine if you havent already. If you want, he said he could send in Prednisone to see if that helps until we can discuss this with Dr. Iglesias.    Let me know,     Randi---------------------  From: Randi Finch CMA ([facility]ott Message Pool (32224_[facility]))   To: MITCHELL KRISHNA    Sent: 5/4/2021 1:49:11 PM CDT  Subject: RE: CONSUMER MESSAGE FW: Itching

## 2022-02-16 NOTE — TELEPHONE ENCOUNTER
---------------------  From: Blossom Ramirez MD   To: Phone Messages [employer] (32224_[facility]);     Sent: 3/28/2019 9:36:19 AM CDT  Subject: MRI results     MRI shows severe narrowing in the cervical spine that could be affecting the spinal cord. I think that a consult with a spine surgeon is a good idea. If she is agreeable, we can put through a referral to get a consult set up.Returned Call  Time: 9:40 am  Note:  Called & left a message asking pt to call back.---------------------  From: Dorota Medina CMA (Phone Messages [employer] (32224_[facility]))   To: Blossom Ramirez MD;     Sent: 3/28/2019 11:42:51 AM CDT  Subject: RE: MRI results     Pt came into the clinic. I verbalized your message and patient agreed to go ahead with the referral. I gave her the Referral 1/2 sheet.   She is leaving for [place] next week but will be back after that.  ** Submitted: **  Order:Referral (Request)  Details:  4/1/2019 8:10 AM CDT, Referred to: Orthopaedics, Referred to: spine, Cervical Spinal Stenosis         Signed by Blossom Ramirez MD  4/1/2019 8:10:00 AM---------------------  From: Blossom Ramirez MD   To: Referral Coordinators Pool (32224_[facility]);     Sent: 4/1/2019 8:10:36 AM CDT  Subject: FW: MRI results
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
## 2022-02-16 NOTE — LETTER
(Inserted Image. Unable to display)           [address] [place] [address] [phone]    May 14, 2021      MITCHELL KRISHNA      [address] [place] [address]        Dear MITCHELL,    Thank you for selecting [facility] for your healthcare needs. Below you will find the result of your recent test(s) done at our clinic.     These are stable and unremarkable.      Result Name Current Result Previous Result Reference Range   Sodium Level (mmol/L)  137 5/13/2021  140 11/15/2019 135 - 146   Potassium Level (mmol/L)  4.4 5/13/2021  4.4 11/15/2019 3.5 - 5.3   Chloride Level (mmol/L)  100 5/13/2021  103 11/15/2019 98 - 110   CO2 Level (mmol/L)  27 5/13/2021  28 11/15/2019 20 - 32   Glucose Level (mg/dL)  87 5/13/2021  95 11/15/2019 65 - 99   BUN (mg/dL)  18 5/13/2021  15 11/15/2019 7 - 25   Creatinine Level (mg/dL) ((H)) 1.11 5/13/2021  0.99 11/15/2019 0.60 - 0.93   Calcium Level (mg/dL)  9.5 5/13/2021  9.6 11/15/2019 8.6 - 10.4   Bilirubin Total (mg/dL)  0.7 5/13/2021  0.2 - 1.2   Alkaline Phosphatase (unit/L)  72 5/13/2021  37 - 153   AST/SGOT (unit/L)  19 5/13/2021  10 - 35   ALT/SGPT (unit/L)  11 5/13/2021  6 - 29   Protein Total (gm/dL)  6.3 5/13/2021  6.1 - 8.1   Albumin Level (gm/dL)  3.8 5/13/2021  3.6 - 5.1   Globulin  2.5 5/13/2021  1.9 - 3.7   A/G Ratio  1.5 5/13/2021  1.0 - 2.5   TSH (mIU/L)  1.93 5/13/2021  0.40 - 4.50   Iron Level (mcg/dL)  133 5/13/2021  45 - 160   TIBC  298 5/13/2021  250 - 450   Iron Saturation  45 5/13/2021  16 - 45   WBC  7.7 5/13/2021  3.8 - 10.8   RBC  4.46 5/13/2021  3.80 - 5.10   Hgb (gm/dL)  14.0 5/13/2021  11.7 - 15.5   Hct (%)  40.9 5/13/2021  35.0 - 45.0   MCV (fL)  91.7 5/13/2021  80.0 - 100.0   MCH (pg)  31.4 5/13/2021  27.0 - 33.0   MCHC (gm/dL)  34.2 5/13/2021  32.0 - 36.0   RDW (%)  13.1 5/13/2021  11.0 - 15.0   Platelet  400 5/13/2021  140 - 400   MPV (fL)  8.9 5/13/2021  7.5 - 12.5   Sed Rate  6 5/13/2021   - < OR = 30        Please contact my practice at [phone] if you have any questions or concerns.      Sincerely,        Cortes Xie MD ,   Fátima Ramirez MD,   Kei Albarran PA-C                  What do you labs mean?  Below is a glossary of commonly ordered labs:  LDL - Bad Cholesterol HDL - Good Cholesterol  AST/ALT - Liver Function Cr/creatinine - Kidney Function  Microalbumin - Kidney Function  TSH - Thyroid Function PSA- Prostate  Hgb - iron stores/blood count Hgb A1c - Diabetic control

## 2022-02-16 NOTE — NURSING NOTE
Medicare Visit Entered On:  3/20/2019 1:14 PM CDT    Performed On:  3/20/2019 1:04 PM CDT by Crissy Forrest CMA               Summary   Chief Complaint :   AWV   Advance Directive :   No   Menstrual Status :   Postmenopausal   Weight Measured :   180.4 lb(Converted to: 180 lb 6 oz, 81.83 kg)    Height Measured :   61 in(Converted to: 5 ft 1 in, 154.94 cm)    Body Mass Index :   34.08 kg/m2 (HI)    Body Surface Area :   1.87 m2   Systolic Blood Pressure :   124 mmHg   Diastolic Blood Pressure :   64 mmHg   Mean Arterial Pressure :   84 mmHg   Peripheral Pulse Rate :   66 bpm   BP Method :   Manual   HR Method :   Manual   Temperature Tympanic :   96.9 DegF(Converted to: 36.1 DegC)  (LOW)    Race :      Languages :   English   Ethnicity :   Not  or    Crissy Forrest CMA - 3/20/2019 1:04 PM CDT   Health Status   Allergies Verified? :   Yes   Medication History Verified? :   Yes   Immunizations Current :   Yes   Medical History Verified? :   Yes   Pre-Visit Planning Status :   Completed   Tobacco Use? :   Former smoker   Crissy Forrest CMA - 3/20/2019 1:04 PM CDT   Consents   Consent for Immunization Exchange :   Consent Granted   Consent for Immunizations to Providers :   Consent Granted   Crissy Forrest CMA - 3/20/2019 1:04 PM CDT   Meds / Allergies   (As Of: 3/20/2019 1:14:32 PM CDT)   Allergies (Active)   Xarelto  Estimated Onset Date:   Unspecified ; Reactions:   Itching ; Created By:   Crystal Simeon LPN; Reaction Status:   Active ; Category:   Drug ; Substance:   Xarelto ; Type:   Allergy ; Updated By:   Crystal Simeon LPN; Reviewed Date:   3/20/2019 1:11 PM CDT        Medication List   (As Of: 3/20/2019 1:14:32 PM CDT)   Prescription/Discharge Order    amLODIPine  :   amLODIPine ; Status:   Prescribed ; Ordered As Mnemonic:   amLODIPine 2.5 mg oral tablet ; Simple Display Line:   2.5 mg, 1 tab(s), PO, Daily, 90 tab(s), 3 Refill(s) ; Ordering Provider:   Blossom Ramirez MD; Catalog Code:    amLODIPine ; Order Dt/Tm:   3/19/2018 2:23:47 PM          amoxicillin  :   amoxicillin ; Status:   Prescribed ; Ordered As Mnemonic:   Amoxil 500 mg oral tablet ; Simple Display Line:   2,000 mg, 4 tab(s), po, once, Take 1 hour prior to dental work, 4 tab(s), 5 Refill(s) ; Ordering Provider:   Blossom Ramirez MD; Catalog Code:   amoxicillin ; Order Dt/Tm:   3/19/2018 2:23:56 PM          calcium-vitamin D  :   calcium-vitamin D ; Status:   Prescribed ; Ordered As Mnemonic:   Calcium 600+D 600 mg-200 intl units oral tablet ; Simple Display Line:   1 tab(s), PO, qam, 90 tab(s) ; Ordering Provider:   Juli Cain MD; Catalog Code:   calcium-vitamin D ; Order Dt/Tm:   4/2/2014 5:52:48 PM          cholecalciferol  :   cholecalciferol ; Status:   Prescribed ; Ordered As Mnemonic:   Vitamin D3 1000 intl units oral tablet ; Simple Display Line:   1,000 International Unit, 1 tab(s), po, daily, 90 tab(s) ; Ordering Provider:   Juli Cain MD; Catalog Code:   cholecalciferol ; Order Dt/Tm:   4/2/2014 5:52:11 PM          hydroCHLOROthiazide  :   hydroCHLOROthiazide ; Status:   Prescribed ; Ordered As Mnemonic:   hydroCHLOROthiazide 25 mg oral tablet ; Simple Display Line:   25 mg, 1 tab(s), PO, Daily, 90 tab(s), 3 Refill(s) ; Ordering Provider:   Blossom Ramirez MD; Catalog Code:   hydroCHLOROthiazide ; Order Dt/Tm:   3/19/2018 2:23:48 PM          losartan  :   losartan ; Status:   Prescribed ; Ordered As Mnemonic:   losartan 100 mg oral tablet ; Simple Display Line:   100 mg, 1 tab(s), PO, Daily, 90 tab(s), 3 Refill(s) ; Ordering Provider:   Blossom Ramirez MD; Catalog Code:   losartan ; Order Dt/Tm:   3/19/2018 2:23:54 PM          naproxen  :   naproxen ; Status:   Prescribed ; Ordered As Mnemonic:   naproxen 500 mg oral tablet ; Simple Display Line:   500 mg, 1 tab(s), PO, BID, PRN: as needed for arthritis, 60 EA, 5 Refill(s) ; Ordering Provider:   Blossom Ramirez MD; Catalog Code:   naproxen ; Order Dt/Tm:   3/2/2016  12:18:38 PM          omeprazole  :   omeprazole ; Status:   Prescribed ; Ordered As Mnemonic:   omeprazole 20 mg oral delayed release capsule ; Simple Display Line:   20 mg, 1 cap(s), PO, Daily, 90 cap(s), 3 Refill(s) ; Ordering Provider:   Blossom Ramirez MD; Catalog Code:   omeprazole ; Order Dt/Tm:   3/19/2018 2:23:50 PM          sertraline  :   sertraline ; Status:   Prescribed ; Ordered As Mnemonic:   sertraline 100 mg oral tablet ; Simple Display Line:   100 mg, 1 tab(s), PO, Daily, 90 tab(s), 3 Refill(s) ; Ordering Provider:   Blossom Ramirez MD; Catalog Code:   sertraline ; Order Dt/Tm:   3/19/2018 2:23:51 PM          tiZANidine  :   tiZANidine ; Status:   Prescribed ; Ordered As Mnemonic:   tiZANidine 4 mg oral tablet ; Simple Display Line:   1-2 tabs, PO, q8hr, 30 tab(s), 2 Refill(s) ; Ordering Provider:   Blossom Ramirez MD; Catalog Code:   tiZANidine ; Order Dt/Tm:   3/19/2018 2:21:51 PM          traZODone  :   traZODone ; Status:   Prescribed ; Ordered As Mnemonic:   traZODone 50 mg oral tablet ; Simple Display Line:   50 mg, 1 tab(s), po, hs, PRN: for sleep, 30 tab(s), 8 Refill(s) ; Ordering Provider:   Blossom Ramirez MD; Catalog Code:   traZODone ; Order Dt/Tm:   3/19/2018 2:23:39 PM            Home Meds    cephalexin  :   cephalexin ; Status:   Processing ; Ordered As Mnemonic:   cephalexin 500 mg oral capsule ; Action Display:   Complete ; Catalog Code:   cephalexin ; Order Dt/Tm:   3/20/2019 1:10:49 PM          mupirocin topical  :   mupirocin topical ; Status:   Documented ; Ordered As Mnemonic:   mupirocin 2% topical cream ; Simple Display Line:   Topical, tid, 0 Refill(s) ; Catalog Code:   mupirocin topical ; Order Dt/Tm:   12/17/2018 11:18:33 AM            Geriatric Depression Screening   Geriatric Depression Satisfied Life :   Yes   Geriatric Depression Dropped Activities :   Yes   Geriatric Depression Life Empty :   Yes   Geriatric Depression Bored :   Yes   Geriatric Depression Good  Spirits :   Yes   Geriatric Depression Afraid Bad Things :   No   Geriatric Depression Feel Happy :   Yes   Geriatric Depression Feel Helpless :   Yes   Geriatric Depression Prefer to Stay Home :   Yes   Geriatric Depression Memory Problems :   No   Geriatric Depression Wonderful Be Alive :   Yes   Geriatric Depression Feel Worthless :   No   Geriatric Depression Situation Hopeless :   No   Geriatric Depression Others Better Off :   Yes   Geriatric Depression Full of Energy :   No   Geriatric Depression Total Score :   7    Crissy Forrest CMA - 3/20/2019 1:04 PM CDT   Hearing and Vision Screening   Audiogram Result Right Ear :   Pass   Audiogram Result Left Ear :   Pass   Hearing Screen Comments :   repeat in one year   Vision Screen Comments :   resent eye dr visit   Forrest TARASAugustoCrissy - 3/20/2019 1:04 PM CDT   Home Safety Screen   Emergency Numbers Kept/Updated :   Yes   Aware of Smoking Dangers :   Yes   Smoke Alarms/Fire Extinguisher Available :   No   Household Members Fire Safety Knowledge :   No   Firearms Unloaded and Secure :   Yes   Floor Rugs Removed or Fastened :   Yes   Mats in Bathtub/Shower :   Yes   Stairway Rails or Banisters :   Yes   Outdoor Clutter Safety :   No   Indoor Clutter Safety :   Yes   Electrical Cord Safety :   Yes   Forrest TARAS Crissy - 3/20/2019 1:04 PM CDT   Barber Fall Risk   History of Fall in Last 3 Months Barber :   No   Lon GRAJEDA Crissy - 3/20/2019 1:04 PM CDT   Functional Assessment   Focused Functional Assessment Grid   Bathing :   Independent (2)   Dressing :   Independent (2)   Toileting :   Independent (2)   Transferring Bed or Chair :   Independent (2)   Feeding :   Independent (2)   Lon GRAJEDA Crissy - 3/20/2019 1:04 PM CDT   Capable of Shopping :   Yes   Capable of Walking :   Yes   Capable of Housekeeping :   Yes   Capable of Managing Medications :   Yes   Capable of Handling Finances :   Yes   Lon GRAJEDA Crissy - 3/20/2019 1:04 PM CDT

## 2022-02-16 NOTE — TELEPHONE ENCOUNTER
---------------------  From: Justa Dunlap (Phone Messages Pool ([id number]))   To: [employer] Message Pool ([id number]);     Sent: 12/28/2020 12:36:03 PM CST  Subject: FW: Fasting Lab Tests       I do not see any labs scanned into chart- maybe in [facility] box?    ---------------------  From: DEANN KRISHNA  To: [facility]  Sent: 12/28/2020 11:51 a.m. CST  Subject: Fasting Lab Tests  I got a letter that I m due for fasting lab tests. I was wondering if the blood tests results I dropped off at the [facility] were good enough or if I actually do need some tests done?    Thank you  Deann Krishna  [address] in place from June to return for lipids and BMP. Looks like she had a CMP done in Aug at [facility] (scanned in chart) but no lipid. Do you still want BMP? Just want to make sure-thanks!    I will let her know she still needs a lipid panel.---------------------  From: Randi Finch CMA ([employer] Message Pool ([id number]))   To: Blossom Ramirez MD;     Sent: 12/28/2020 12:47:24 PM CST  Subject: FW: Fasting Lab TestsOK to plan for chem 8 and fasting lipid in August 2021. Thanks---------------------  From: Blossom Ramirez MD   To: [employer] Message Pool ([id number]);     Sent: 12/28/2020 4:53:58 PM CST  Subject: RE: Fasting Lab TestsApril [place] postpone and do all the lab work you will need in Aug.     Thanks for checking!    Randi---------------------  From: Randi Finch CMA ([employer] Message Pool ([id number]))   To: DEANN KRISHNA    Sent: 12/29/2020 5:51:37 PM CST  Subject: RE: Fasting Lab Tests

## 2022-02-16 NOTE — TELEPHONE ENCOUNTER
---------------------  From: Jenny Castellon CMA (Phone Messages Pool ([id number]_[facility]))   To: Referral Coordinators Pool ([id number]_[facility]);     Cc: Blossom Ramirez MD;      Sent: 4/18/2019 4:33:27 PM CDT  Subject: FW: MRI           ---------------------  From: DEANN KRISHNA  To: [facility]  Sent: 04/18/2019 04:29 p.m. CDT  Subject: MRI  Hi  I saw DR Garg today in [place] and after we got almost all set up for a surgery the hospitals he does them at won t take my insurance. He is going to refer me to DR Garcia at [facility] in [place].  Do you know if that s the closest location that takes ?    Thanks!  Deann Hernandez are having a very difficult time finding specialists in this area who take [facility]. I do not know of any closer and just had another patient in the same situation. I am sorry for your trouble and having to travel so far.    I'll reach out to our referral office to see if they have heard of anything new.    Thanks,  Fátima Ramirez---------------------  From: Blossom Ramirez MD   To: Phone Messages Pool ([id number]_[facility]); DEANN KRISHNA    Sent: 4/18/2019 4:38:19 PM CDT  Subject: RE: MEÑO contacted patient and informed her that we were not aware that the locations that Dr. Garg does surgeries is out of network for .  Dr. Garcia is the only one that I am aware that accepts [employer] [facility] for back & neck issues.

## 2022-02-16 NOTE — TELEPHONE ENCOUNTER
Entered by Randi Finch CMA on October 28, 2020 3:01:48 PM CDT  Modesto Zacarias, I confirmed with [facility] what is needed-can you please let me know which hand you need this for? Thanks!      ---------------------  From: DEANN KRISHNA  To: [employer]ched Message Pool ([id number]_[facility])  Sent: 10/27/2020 05:59 p.m. CDT  Subject: RE: Prosthetic hand  Thank you SO much!!    Deann Krishna       Addendum by Randi Finch CMA on October 27, 2020 5:48:28 PM CDT  ---------------------  From: Randi Finch CMA (Crunched Message Pool ([id number]_[facility]))  To: DEANN KRISHNA  Sent: 10/27/2020 5:48:28 PM CDT  Subject: RE: Prosthetic hand       Addendum by Randi Finch CMA on October 27, 2020 5:48:19 PM CDT  Modesto Zacarias, We would be happy to write an order for you. I will call Hi tomorrow to get more specifics on what they would like for the order. I will let you know once it is completed.       Take care,       Randi  ---------------------  From: Jayshree Davis RN (Phone Messages Pool ([id number]_[facility]))  To: Crunched Message Pool ([id number]_[facility]);  Sent: 10/27/2020 5:38:29 PM CDT  Subject: FW: Prosthetic hand                      ---------------------  From: DEANN KRISHNA  To: [facility]  Sent: 10/27/2020 05:35 p.m. CDT  Subject: Prosthetic hand  Dr Ramirez  I was fitted for a back brace yesterday by [facility] [employer]at the [facility].  They said if I got a perscription from my DR I could get a prosthetic hand so I wouldn t have to wear the halloween hands I ve been wearing.  I would very much like to get one. Would you please get me a perscription ?  Thank you so much!  Deann Krishna  [birth year]---------------------  From: Randi Finch CMA ([employer] Message Pool ([id number]_[facility]))   To: DEANN KRISHNA    Sent: 10/28/2020 3:01:53 PM CDT  Subject: RE: Prosthetic hand

## 2022-02-16 NOTE — TELEPHONE ENCOUNTER
Entered by Randi Finch CMA on May 04, 2021 3:04:05 PM CDT  Modesto Zacarias,    I will send it right in for you!    Let us know how you are doing next week.    Randi    ---------------------  From: MITCHELL KRISHNA  To: [facility] Message Pool ([id number]_[facility])  Sent: 05/04/2021 02:58 p.m. CDT  Subject: RE: CONSUMER MESSAGE FW: Itching  Is that prescription going to be called in today? I have tried OTC antihistamines. No luck there. I hope I can  today. Thank you.    I don t know if my other replies went through.       Addendum by Randi Finch CMA on May 04, 2021 1:49:11 PM CDT  ---------------------  From: Randi Finch CMA ([facility] Message Pool ([id number]_[facility]))  To: MITCHELL KRISHNA  Sent: 5/4/2021 1:49:11 PM CDT  Subject: RE: CONSUMER MESSAGE FW: Itching       Addendum by Randi Finch CMA on May 04, 2021 1:49:05 PM CDT  Modesto Zacarias,       I asked Kei Albarran one of our PA s that works closely with Dr. Iglesias. He said to try an over the counter antihistamine if you havent already. If you want, he said he could send in Prednisone to see if that helps until we can discuss this with Dr. Iglesias.       Let me know,       Randi       Addendum by Kei Albarran PA-C on May 04, 2021 12:31:36 PM CDT  ---------------------  From: Kei Albarran PA-C  To: [employer] Message Pool ([id number]_[facility]);  Sent: 5/4/2021 12:31:36 PM CDT  Subject: RE: CONSUMER MESSAGE FW: Itching       Tried to call NA. Mailbox is full. She could try OTC antihistamine if not already on that. If she wants, prednisone 20 mg once daily x one week until [facility] is back.       KAH       Addendum by Randi Finch CMA on May 04, 2021 11:42:04 AM CDT  ---------------------  From: Randi Finch CMA ([facility] Message Pool ([id number]_[facility]))  To: Kei Albarran PA-C;  Sent: 5/4/2021 11:42:04 AM CDT  Subject: FW: CONSUMER MESSAGE FW: Itching       Addendum by Randi Finch CMA on May 04, 2021 11:41:58 AM CDT  Are you willing to address?  ---------------------  From:  Debora Riggs LPN (Phone Messages Pool ([id number]_[facility]))  To: [facility] Message Pool ([id number]);  Sent: 5/4/2021 11:10:54 AM CDT  Subject: FW: CONSUMER MESSAGE FW: Itching                      ---------------------  From: DEANN KRISHNA  To: [facility]  Sent: 05/04/2021 11:06 a.m. CDT  Subject: RE: CONSUMER MESSAGE FW: Itching  I guess I can wait till next week.  Thank you.  ---------------------  From: Debora Riggs LPN (Phone Messages Pool ([id number]))  To: [employer] Message Pool ([address] [place] [address]); DEANN KRISHNA  Sent: 5/4/2021 8:19:37 AM CDT  Subject: CONSUMER MESSAGE FW: Itching       Dr. Ashley Ferguson is out of clinic this week. I can forward your message to her for next week or you can schedule an appointment with another provider if you would like to discuss sooner.       Thanks,  Debora FITCH LPN                 ---------------------  From: DEANN KRISHNA  To: [facility]  Sent: 05/03/2021 10:04 p.m. CDT  Subject: Itching  I m itching so bad every day. I think I might have some allergies. In the morning My eyes itch, and burn, my nose runs. My whole body itches especially at night.My dermatologist knows this but doesn t do anything. Says to buy lotions. I ve tried every lotion I can think of. Baking soda baths oatmeal baths. I only use Ivory soap. Help! Please!  Deann Krishna---------------------  From: Randi Finch CMA ([employer] Message Pool ([id number]))   To: DEANN KRISHNA    Sent: 5/4/2021 3:04:09 PM CDT  Subject: RE: CONSUMER MESSAGE FW: Itching

## 2022-02-16 NOTE — NURSING NOTE
Comprehensive Intake Entered On:  12/16/2020 1:55 PM CST    Performed On:  12/16/2020 1:49 PM CST by Randi Finch CMA               Summary   Chief Complaint :   Needs face to face to get a new prosthetic hand.    Advance Directive :   Yes   Menstrual Status :   Postmenopausal   Weight Measured :   184 lb(Converted to: 184 lb 0 oz, 83.461 kg)    Height Measured :   61 in(Converted to: 5 ft 1 in, 154.94 cm)    Body Mass Index :   34.76 kg/m2 (HI)    Body Surface Area :   1.89 m2   Systolic Blood Pressure :   112 mmHg   Diastolic Blood Pressure :   60 mmHg   Mean Arterial Pressure :   77 mmHg   Peripheral Pulse Rate :   72 bpm   BP Site :   Right arm   BP Method :   Manual   Temperature Tympanic :   97.9 DegF(Converted to: 36.6 DegC)    Oxygen Saturation :   97 %   Race :      Languages :   English   Ethnicity :   Not  or    Rnadi Finch CMA - 12/16/2020 1:49 PM CST   Health Status   Allergies Verified? :   Yes   Medication History Verified? :   Yes   Immunizations Current :   Yes   Pre-Visit Planning Status :   Completed   Randi Finch CMA - 12/16/2020 1:49 PM CST   Consents   Consent for Immunization Exchange :   Consent Granted   Consent for Immunizations to Providers :   Consent Granted   Randi Finch CMA - 12/16/2020 1:49 PM CST   Meds / Allergies   (As Of: 12/16/2020 1:55:50 PM CST)   Allergies (Active)   Xarelto  Estimated Onset Date:   Unspecified ; Reactions:   Itching ; Created By:   Crystal Simeon LPN; Reaction Status:   Active ; Category:   Drug ; Substance:   Xarelto ; Type:   Allergy ; Updated By:   Crystal Simeon LPN; Reviewed Date:   6/17/2020 12:03 PM CDT        Medication List   (As Of: 12/16/2020 1:55:50 PM CST)   Prescription/Discharge Order    amLODIPine  :   amLODIPine ; Status:   Prescribed ; Ordered As Mnemonic:   amLODIPine 2.5 mg oral tablet ; Simple Display Line:   1 tab(s), Oral, daily, 90 tab(s), 3 Refill(s) ; Ordering Provider:   Blossom Ramirez MD; Catalog  Code:   amLODIPine ; Order Dt/Tm:   6/17/2020 1:07:42 PM CDT          amoxicillin  :   amoxicillin ; Status:   Prescribed ; Ordered As Mnemonic:   amoxicillin 500 mg oral capsule ; Simple Display Line:   2,000 mg, 4 cap(s), Oral, once, given 1 hour prior to the procedure, 4 cap(s), 4 Refill(s) ; Ordering Provider:   Blossom Ramirez MD; Catalog Code:   amoxicillin ; Order Dt/Tm:   6/17/2020 1:11:53 PM CDT          calcium-vitamin D  :   calcium-vitamin D ; Status:   Prescribed ; Ordered As Mnemonic:   Calcium 600+D 600 mg-200 intl units oral tablet ; Simple Display Line:   1 tab(s), PO, qam, 90 tab(s) ; Ordering Provider:   Juli Cain MD; Catalog Code:   calcium-vitamin D ; Order Dt/Tm:   4/2/2014 5:52:48 PM CDT          cholecalciferol  :   cholecalciferol ; Status:   Prescribed ; Ordered As Mnemonic:   Vitamin D3 1000 intl units oral tablet ; Simple Display Line:   1,000 International Unit, 1 tab(s), po, daily, 90 tab(s) ; Ordering Provider:   Juli Cain MD; Catalog Code:   cholecalciferol ; Order Dt/Tm:   4/2/2014 5:52:11 PM CDT          gabapentin  :   gabapentin ; Status:   Prescribed ; Ordered As Mnemonic:   gabapentin 300 mg oral capsule ; Simple Display Line:   300 mg, 1 cap(s), Oral, hs, 90 cap(s), 3 Refill(s) ; Ordering Provider:   Blossom Ramirez MD; Catalog Code:   gabapentin ; Order Dt/Tm:   6/17/2020 1:11:09 PM CDT          hydroCHLOROthiazide  :   hydroCHLOROthiazide ; Status:   Prescribed ; Ordered As Mnemonic:   hydroCHLOROthiazide 25 mg oral tablet ; Simple Display Line:   1 tab(s), Oral, daily, 90 tab(s), 3 Refill(s) ; Ordering Provider:   Blossom Ramirez MD; Catalog Code:   hydroCHLOROthiazide ; Order Dt/Tm:   6/17/2020 1:07:49 PM CDT          losartan  :   losartan ; Status:   Prescribed ; Ordered As Mnemonic:   losartan 100 mg oral tablet ; Simple Display Line:   1 tab(s), Oral, daily, 90 tab(s), 3 Refill(s) ; Ordering Provider:   Blossom Ramirez MD; Catalog Code:   losartan ; Order  Dt/Tm:   6/17/2020 1:07:43 PM CDT          meloxicam  :   meloxicam ; Status:   Prescribed ; Ordered As Mnemonic:   meloxicam 7.5 mg oral tablet ; Simple Display Line:   1 tab(s), Oral, daily, PRN: joint pain, 90 tab(s), 3 Refill(s) ; Ordering Provider:   Blossom Ramirez MD; Catalog Code:   meloxicam ; Order Dt/Tm:   6/17/2020 1:08:36 PM CDT          omeprazole  :   omeprazole ; Status:   Prescribed ; Ordered As Mnemonic:   omeprazole 20 mg oral delayed release capsule ; Simple Display Line:   20 mg, 1 cap(s), PO, Daily, 90 cap(s), 3 Refill(s) ; Ordering Provider:   Blossom Ramirez MD; Catalog Code:   omeprazole ; Order Dt/Tm:   6/17/2020 1:09:04 PM CDT          sertraline  :   sertraline ; Status:   Prescribed ; Ordered As Mnemonic:   sertraline 100 mg oral tablet ; Simple Display Line:   1 tab(s), Oral, daily, 90 tab(s), 3 Refill(s) ; Ordering Provider:   Blossom Ramirez MD; Catalog Code:   sertraline ; Order Dt/Tm:   6/17/2020 1:09:20 PM CDT          tiZANidine  :   tiZANidine ; Status:   Prescribed ; Ordered As Mnemonic:   tiZANidine 4 mg oral tablet ; Simple Display Line:   1-2 tabs, PO, q8hr, 540 tab(s), 0 Refill(s) ; Ordering Provider:   Kei Albarran PA-C; Catalog Code:   tiZANidine ; Order Dt/Tm:   3/16/2020 10:02:53 AM CDT          traZODone  :   traZODone ; Status:   Prescribed ; Ordered As Mnemonic:   traZODone 50 mg oral tablet ; Simple Display Line:   50 mg, 1 tab(s), po, hs, PRN: for sleep, 90 tab(s), 3 Refill(s) ; Ordering Provider:   Blossom Ramirez MD; Catalog Code:   traZODone ; Order Dt/Tm:   6/17/2020 1:09:41 PM CDT            Home Meds    hydrOXYzine  :   hydrOXYzine ; Status:   Documented ; Ordered As Mnemonic:   hydrOXYzine hydrochloride 25 mg oral tablet ; Simple Display Line:   See Instructions, 1 tab(s) Oral as needed for itching, 0 Refill(s) ; Catalog Code:   hydrOXYzine ; Order Dt/Tm:   12/16/2020 1:52:32 PM CST            ID Risk Screen   Recent Travel History :   No recent travel    Family Member Travel History :   No recent travel   Other Exposure to Infectious Disease :   Unknown   Randi Ficnh CMA - 12/16/2020 1:49 PM CST   Social History   Social History   (As Of: 12/16/2020 1:55:50 PM CST)   Alcohol:  Current      Current, Wine (5 oz), Daily, 2 drinks/episode average.  3 drinks/episode maximum.   (Last Updated: 3/8/2017 1:43:35 PM CST by Crystal Simeon LPN)          Tobacco:  Past      Former smoker, quit more than 30 days ago   (Last Updated: 12/16/2020 1:55:44 PM CST by Randi Finch CMA)          Electronic Cigarette/Vaping:  Denies Electronic Cigarette Use      Electronic Cigarette Use: Never.   (Last Updated: 12/16/2020 1:50:49 PM CST by Randi Finch CMA)          Substance Abuse:        Never   (Last Updated: 12/7/2012 9:40:08 AM CST by Tennille Salmeron CMA)          Employment/School:        Retired, Work/School description: Office Work.   (Last Updated: 3/19/2018 2:10:06 PM CDT by Dorota Medina CMA)          Home/Environment:        Marital status: .  Lives with Self.  2 children.  Living situation: Home/Independent.  Home equipment: Prosthetic Left Hand.  Smoker in household: No.   (Last Updated: 3/19/2018 2:11:14 PM CDT by Dorota Medina CMA)          Nutrition/Health:        Type of diet: Regular.  Caffeine intake amount: Coffee Daily.   (Last Updated: 3/19/2018 2:11:25 PM CDT by Dorota Medina CMA)          Exercise:        Exercise type: Walking.   Comments:  2/14/2014 3:17 PM - Saida Hackett CMA: bad hips/spine   (Last Updated: 2/14/2014 3:17:32 PM CST by Saida Hackett CMA)          Sexual:        Sexually active: No.   Comments:  2/14/2014 3:21 PM - Saida Hackett CMA: Tubal Ligation   (Last Updated: 3/21/2019 12:15:52 PM CDT by Petrona Kaplan)            OB/GYN History   Menstrual Status :   Postmenopausal   Randi Finch CMA - 12/16/2020 1:49 PM CST   Pregnancy History   (As Of: 12/16/2020 1:55:50 PM CST)   No pregnancy history documented

## 2022-02-16 NOTE — TELEPHONE ENCOUNTER
Entered by Stephanie Saenz MA on June 13, 2019 8:05:04 AM CDT  ---------------------  From: Stephanie Saenz MA   To: [email or web address] [id number]    Sent: 6/13/2019 8:05:04 AM CDT  Subject: Medication Management     ** Not Approved: Refill not appropriate, Filled until 9/2019 **  meloxicam (MELOXICAM 7.5MG TABLETS)  TAKE 1 TABLET BY MOUTH DAILY  Qty:  90 tab(s)        Days Supply:  30        Refills:  3          Substitutions Allowed     Route To Pharmacy - [email or web address]   Note from Pharmacy:  **Patient requests 90 days supply**  Signed by Stephanie Saenz MA            ------------------------------------------  From: [email or web address]  To: Blossom Ramirez MD  Sent: June 13, 2019 5:01:56 AM CDT  Subject: Medication Management  Due: June 14, 2019 5:01:56 AM CDT    ** On Hold Pending Signature **  Drug: meloxicam (meloxicam 7.5 mg oral tablet)  1 TAB(S) ORAL DAILY  Quantity: 30 tab(s)     Days Supply: 0         Refills: 4  Substitutions Allowed  Notes from Pharmacy: **Patient requests 90 days supply**    Dispensed Drug: meloxicam (meloxicam 7.5 mg oral tablet)  TAKE 1 TABLET BY MOUTH DAILY  Quantity: 90 tab(s)     Days Supply: 30        Refills: 3  Substitutions Allowed  Notes from Pharmacy: **Patient requests 90 days supply**  ------------------------------------------

## 2022-02-16 NOTE — TELEPHONE ENCOUNTER
---------------------  From: Oneil LITTLE, Debora DURAN (Phone Messages Pool ([id number]_[place]))   To: [facility] Message Pool ([id number]_[facility]); DEANN KRISHNA    Sent: 5/4/2021 8:19:37 AM CDT  Subject: CONSUMER MESSAGE FW: Itching     Hi Dr. Ashley Zacarias is out of clinic this week. I can forward your message to her for next week or you can schedule an appointment with another provider if you would like to discuss sooner.    Thanks,  Debora FITCH LPN        ---------------------  From: DEANN KRISHNA  To: [facility]  Sent: 05/03/2021 10:04 p.m. CDT  Subject: Itching  I m itching so bad every day. I think I might have some allergies. In the morning My eyes itch, and burn, my nose runs. My whole body itches especially at night.My dermatologist knows this but doesn t do anything. Says to buy lotions. I ve tried every lotion I can think of. Baking soda baths oatmeal baths. I only use Ivory soap. Help! Please!  Deann Krishna

## 2022-02-16 NOTE — TELEPHONE ENCOUNTER
---------------------  From: Bree Gagnon CMA   To: Phone Messages Pool ([id number]_[facility];     Sent: 7/1/2019 8:13:47 AM CDT  Subject: Headache     PCP:   YESENIA      Time of Call:  0800       Person Calling:  Patient  Phone number:  [phone]    Note:   Patient called stating she woke during the night with a terrible headache, shaking, pupil dilation and nausea. Please advise    Last office visit and reason:  3/20/19 AWV w/ KWL---------------------  From: Bree Gagnon CMA (Phone Messages Pool ([id number]_[facility])   To: Marlene Gallego;     Sent: 7/1/2019 8:34:13 AM CDT  Subject: FW: Headache---------------------  From: Marlene Gallego   To: Ashley MELENDREZ, [facility];     Sent: 7/1/2019 8:50:56 AM CDT  Subject: FW: Headachespoke with patient, improved but not resolved, will come in this afternoon for a visit. Transferred to scheduling
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
## 2022-02-16 NOTE — TELEPHONE ENCOUNTER
Entered by Dorota Medina CMA on August 09, 2019 9:41:42 AM CDT  ---------------------  From: Dorota Medina CMA   To: Info #[id number]    Sent: 8/9/2019 9:41:42 AM CDT  Subject: Medication Management     ** Submitted: **  Order:meloxicam (meloxicam 7.5 mg oral tablet)  1 tab(s)  Oral  daily  Qty:  90 tab(s)        Refills:  1          Substitutions Allowed     Route To Pharmacy - Info #[id number]    Signed by Dorota Medina CMA  8/9/2019 9:40:00 AM    ** Submitted: **  Complete:meloxicam (meloxicam 7.5 mg oral tablet)   Signed by Dorota Medina CMA  8/9/2019 9:41:00 AM    ** Not Approved:  **  meloxicam (MELOXICAM 7.5MG TABLETS)  TAKE 1 TABLET BY MOUTH DAILY  Qty:  30 tab(s)        Days Supply:  30        Refills:  0          Substitutions Allowed     Route To Pharmacy - Info #[id number]   Signed by Dorota Medina CMA            ------------------------------------------  From: Info #[id number]  To: Blossom Ramirez MD  Sent: August 9, 2019 5:01:18 AM CDT  Subject: Medication Management  Due: August 10, 2019 5:01:18 AM CDT    ** On Hold Pending Signature **  Drug: meloxicam (meloxicam 7.5 mg oral tablet)  1 TAB(S) ORAL DAILY  Quantity: 30 tab(s)     Days Supply: 0         Refills: 4  Substitutions Allowed  Notes from Pharmacy:     Dispensed Drug: meloxicam (meloxicam 7.5 mg oral tablet)  TAKE 1 TABLET BY MOUTH DAILY  Quantity: 30 tab(s)     Days Supply: 30        Refills: 0  Substitutions Allowed  Notes from Pharmacy:   ------------------------------------------Med Refill    Date of last office visit and reason:  3/20/19      Date of last Med Check / Px:   3/20/19  Date of last labs pertaining to med:  3/2019    RTC order in chart:  Yes; due in March; started med in March for DJD    For Protocol refill, has patient been contacted:  n/a
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
## 2022-02-16 NOTE — NURSING NOTE
Comprehensive Intake Entered On:  10/9/2019 10:50 AM CDT    Performed On:  10/9/2019 10:49 AM CDT by Crissy Forrest CMA               Summary   Chief Complaint :   BP check   Advance Directive :   Yes   Menstrual Status :   Postmenopausal   Systolic Blood Pressure :   130 mmHg   Diastolic Blood Pressure :   72 mmHg   Mean Arterial Pressure :   91 mmHg   Peripheral Pulse Rate :   66 bpm   Oxygen Saturation :   95 %   Race :      Languages :   English   Ethnicity :   Not  or    Crissy Forrest CMA - 10/9/2019 10:49 AM CDT

## 2022-02-16 NOTE — TELEPHONE ENCOUNTER
---------------------  From: Jayshree Davis RN (Phone Messages Pool ([id number]_[place]))   To: [employer] Message Pool ([id number]_[facility]);     Sent: 10/27/2020 5:38:29 PM CDT  Subject: FW: Prosthetic hand           ---------------------  From: DEANN KRISHNA  To: [facility]  Sent: 10/27/2020 05:35 p.m. CDT  Subject: Prosthetic hand  Dr Ramirez  I was fitted for a back brace yesterday by [facility]at the [facility].  They said if I got a perscription from my DR I could get a prosthetic hand so I wouldn t have to wear the halloween hands I ve been wearing.  I would very much like to get one. Would you please get me a perscription ?    Thank you so much!  Deann Krishna  [birth year]Modesto Zacarias, We would be happy to write an order for you. I will call [facility] tomorrow to get more specifics on what they would like for the order. I will let you know once it is completed.    Randi Oconnell---------------------  From: Randi Finch CMA ([employer] Message Pool ([id number]_[facility]))   To: DEANN KRISHNA    Sent: 10/27/2020 5:48:28 PM CDT  Subject: RE: Prosthetic hand
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
## 2022-02-16 NOTE — TELEPHONE ENCOUNTER
"---------------------  From: Viri LO, Adrianne LEONARD (Phone Messages Pool ([id number]_[facility]))   To: DEANN KRISHNA    Cc: [facility] Message Pool ([id number]_[facility]);      Sent: 3/10/2021 5:14:48 PM CST  Subject: RE: Questions for my DR Jaydon Zacarias,    The Shingles vaccine \"Shingrix\" is new since you last had the vaccine in 2014. It is recommended by the CDC that you get both doses of Shingrix, even though you previously received the original shingles vaccine.    Because you have Medicare, you will need to get the vaccine at a pharmacy. Medicare will not pay for the vaccine to be given in clinic.    Have a great day,    Adrianne Meredith RN      ---------------------  From: DEANN KRISHNA  To: [facility]  Sent: 03/10/2021 02:39 p.m. CST  Subject: Questions for my DR  I got my immunization history from the department of health. I had 1 shingles shot 8/27/14. Do I need to get 1 more now or start over because it was so long ago? It says I also need a Varicella shot. Do I need to get them at a pharmacy or DR s office? Please advise.    Thank you,  Deann"
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
## 2022-02-16 NOTE — NURSING NOTE
Medicare Visit Entered On:  7/1/2021 2:05 PM CDT    Performed On:  7/1/2021 1:54 PM CDT by Astrid Matta               Summary   Chief Complaint :   AWV   Advance Directive :   Yes   Menstrual Status :   Postmenopausal   Weight Measured :   184.4 lb(Converted to: 184 lb 6 oz, 83.642 kg)    Height Measured :   61 in(Converted to: 5 ft 1 in, 154.94 cm)    Body Mass Index :   34.84 kg/m2 (HI)    Body Surface Area :   1.9 m2   Systolic Blood Pressure :   104 mmHg   Diastolic Blood Pressure :   60 mmHg   Mean Arterial Pressure :   75 mmHg   Peripheral Pulse Rate :   67 bpm   BP Site :   Right arm   BP Method :   Manual   Respiratory Rate :   16 br/min   Oxygen Saturation :   99 %   Race :      Languages :   English   Ethnicity :   Not  or    Astrid Matta - 7/1/2021 1:54 PM CDT   Health Status   Allergies Verified? :   Yes   Medication History Verified? :   Yes   Immunizations Current :   Yes   Pre-Visit Planning Status :   Completed   Tobacco Use? :   Former smoker   Astrid Matta - 7/1/2021 1:54 PM CDT   Consents   Consent for Immunization Exchange :   Consent Granted   Consent for Immunizations to Providers :   Consent Granted   Astrid Matta - 7/1/2021 1:54 PM CDT   Meds / Allergies   (As Of: 7/1/2021 2:05:11 PM CDT)   Allergies (Active)   Dust  Estimated Onset Date:   Unspecified ; Created By:   Pterona Kaplan; Reaction Status:   Active ; Category:   Environment ; Substance:   Dust ; Type:   Allergy ; Updated By:   Petrona Kaplan; Reviewed Date:   5/13/2021 2:11 PM CDT      Xarelto  Estimated Onset Date:   Unspecified ; Reactions:   Itching ; Created By:   Crystal Simeon LPN; Reaction Status:   Active ; Category:   Drug ; Substance:   Xarelto ; Type:   Allergy ; Updated By:   Crystal Simeon LPN; Reviewed Date:   5/13/2021 2:11 PM CDT        Medication List   (As Of: 7/1/2021 2:05:11 PM CDT)   Prescription/Discharge Order    amLODIPine  :   amLODIPine ; Status:   Prescribed ; Ordered As  Mnemonic:   amLODIPine 2.5 mg oral tablet ; Simple Display Line:   See Instructions, TAKE 1 TABLET BY MOUTH DAILY, 90 tab(s), 1 Refill(s) ; Ordering Provider:   Blossom Ramirez MD; Catalog Code:   amLODIPine ; Order Dt/Tm:   6/3/2021 11:10:47 AM CDT          amLODIPine  :   amLODIPine ; Status:   Prescribed ; Ordered As Mnemonic:   amLODIPine 2.5 mg oral tablet ; Simple Display Line:   1 tab(s), Oral, daily, 90 tab(s), 3 Refill(s) ; Ordering Provider:   Blossom Ramirez MD; Catalog Code:   amLODIPine ; Order Dt/Tm:   6/17/2020 1:07:42 PM CDT          amoxicillin  :   amoxicillin ; Status:   Prescribed ; Ordered As Mnemonic:   amoxicillin 500 mg oral capsule ; Simple Display Line:   2,000 mg, 4 cap(s), Oral, once, given 1 hour prior to the procedure, 4 cap(s), 4 Refill(s) ; Ordering Provider:   Blossom Ramirez MD; Catalog Code:   amoxicillin ; Order Dt/Tm:   6/17/2020 1:11:53 PM CDT          calcium-vitamin D  :   calcium-vitamin D ; Status:   Prescribed ; Ordered As Mnemonic:   Calcium 600+D 600 mg-200 intl units oral tablet ; Simple Display Line:   1 tab(s), PO, qam, 90 tab(s) ; Ordering Provider:   Juli Cain MD; Catalog Code:   calcium-vitamin D ; Order Dt/Tm:   4/2/2014 5:52:48 PM CDT          cholecalciferol  :   cholecalciferol ; Status:   Prescribed ; Ordered As Mnemonic:   Vitamin D3 1000 intl units oral tablet ; Simple Display Line:   1,000 International Unit, 1 tab(s), po, daily, 90 tab(s) ; Ordering Provider:   Juli Cain MD; Catalog Code:   cholecalciferol ; Order Dt/Tm:   4/2/2014 5:52:11 PM CDT          gabapentin  :   gabapentin ; Status:   Prescribed ; Ordered As Mnemonic:   gabapentin 300 mg oral capsule ; Simple Display Line:   300 mg, 1 cap(s), Oral, hs, 90 cap(s), 1 Refill(s) ; Ordering Provider:   Blossom Ramirez MD; Catalog Code:   gabapentin ; Order Dt/Tm:   6/3/2021 11:12:41 AM CDT          hydroCHLOROthiazide  :   hydroCHLOROthiazide ; Status:   Prescribed ; Ordered As Mnemonic:    hydroCHLOROthiazide 25 mg oral tablet ; Simple Display Line:   See Instructions, TAKE 1 TABLET BY MOUTH DAILY, 90 tab(s), 1 Refill(s) ; Ordering Provider:   Blossom Ramirez MD; Catalog Code:   hydroCHLOROthiazide ; Order Dt/Tm:   6/3/2021 11:11:08 AM CDT          hydroCHLOROthiazide  :   hydroCHLOROthiazide ; Status:   Prescribed ; Ordered As Mnemonic:   hydroCHLOROthiazide 25 mg oral tablet ; Simple Display Line:   1 tab(s), Oral, daily, 90 tab(s), 3 Refill(s) ; Ordering Provider:   Blossom Ramirez MD; Catalog Code:   hydroCHLOROthiazide ; Order Dt/Tm:   6/17/2020 1:07:49 PM CDT          hydrOXYzine  :   hydrOXYzine ; Status:   Prescribed ; Ordered As Mnemonic:   hydrOXYzine hydrochloride 25 mg oral tablet ; Simple Display Line:   25 mg, 1 tab(s), Oral, qid, PRN: for itching, 40 tab(s), 1 Refill(s) ; Ordering Provider:   Blossom Ramirez MD; Catalog Code:   hydrOXYzine ; Order Dt/Tm:   6/3/2021 1:52:45 PM CDT          losartan  :   losartan ; Status:   Prescribed ; Ordered As Mnemonic:   losartan 100 mg oral tablet ; Simple Display Line:   See Instructions, TAKE 1 TABLET BY MOUTH DAILY, 90 tab(s), 1 Refill(s) ; Ordering Provider:   Blossom Ramirez MD; Catalog Code:   losartan ; Order Dt/Tm:   6/3/2021 11:11:20 AM CDT          losartan  :   losartan ; Status:   Prescribed ; Ordered As Mnemonic:   losartan 100 mg oral tablet ; Simple Display Line:   1 tab(s), Oral, daily, 90 tab(s), 3 Refill(s) ; Ordering Provider:   Blossom Ramirez MD; Catalog Code:   losartan ; Order Dt/Tm:   6/17/2020 1:07:43 PM CDT          meloxicam  :   meloxicam ; Status:   Prescribed ; Ordered As Mnemonic:   meloxicam 7.5 mg oral tablet ; Simple Display Line:   See Instructions, TAKE 1 TABLET BY MOUTH DAILY AS NEEDED FOR JOINT PAIN, 90 tab(s), 1 Refill(s) ; Ordering Provider:   Blossom Ramirez MD; Catalog Code:   meloxicam ; Order Dt/Tm:   6/3/2021 11:12:03 AM CDT          meloxicam  :   meloxicam ; Status:   Prescribed ; Ordered As  Mnemonic:   meloxicam 7.5 mg oral tablet ; Simple Display Line:   1 tab(s), Oral, daily, PRN: joint pain, 90 tab(s), 3 Refill(s) ; Ordering Provider:   Blossom Ramirez MD; Catalog Code:   meloxicam ; Order Dt/Tm:   6/17/2020 1:08:36 PM CDT          omeprazole  :   omeprazole ; Status:   Prescribed ; Ordered As Mnemonic:   omeprazole 20 mg oral delayed release capsule ; Simple Display Line:   See Instructions, TAKE 1 CAPSULE BY MOUTH DAILY, 90 cap(s), 1 Refill(s) ; Ordering Provider:   Blossom Ramirez MD; Catalog Code:   omeprazole ; Order Dt/Tm:   6/3/2021 11:12:02 AM CDT          omeprazole  :   omeprazole ; Status:   Prescribed ; Ordered As Mnemonic:   omeprazole 20 mg oral delayed release capsule ; Simple Display Line:   20 mg, 1 cap(s), PO, Daily, 90 cap(s), 3 Refill(s) ; Ordering Provider:   Blossom Ramirez MD; Catalog Code:   omeprazole ; Order Dt/Tm:   6/17/2020 1:09:04 PM CDT          sertraline  :   sertraline ; Status:   Prescribed ; Ordered As Mnemonic:   sertraline 100 mg oral tablet ; Simple Display Line:   See Instructions, TAKE 1 TABLET BY MOUTH DAILY, 90 tab(s), 1 Refill(s) ; Ordering Provider:   Blossom Ramirez MD; Catalog Code:   sertraline ; Order Dt/Tm:   6/3/2021 11:12:03 AM CDT          sertraline  :   sertraline ; Status:   Prescribed ; Ordered As Mnemonic:   sertraline 100 mg oral tablet ; Simple Display Line:   1 tab(s), Oral, daily, 90 tab(s), 3 Refill(s) ; Ordering Provider:   Blossom Ramirez MD; Catalog Code:   sertraline ; Order Dt/Tm:   6/17/2020 1:09:20 PM CDT          tiZANidine  :   tiZANidine ; Status:   Prescribed ; Ordered As Mnemonic:   tiZANidine 4 mg oral tablet ; Simple Display Line:   1-2 tabs, PO, q8hr, 540 tab(s), 0 Refill(s) ; Ordering Provider:   Kei Albarran PA-C; Catalog Code:   tiZANidine ; Order Dt/Tm:   3/16/2020 10:02:53 AM CDT          traZODone  :   traZODone ; Status:   Prescribed ; Ordered As Mnemonic:   traZODone 50 mg oral tablet ; Simple Display Line:    See Instructions, TAKE 1 TABLET BY MOUTH AT BEDTIME AS NEEDED FOR SLEEP, 90 tab(s), 1 Refill(s) ; Ordering Provider:   Blossom Ramirez MD; Catalog Code:   traZODone ; Order Dt/Tm:   6/3/2021 11:12:04 AM CDT          traZODone  :   traZODone ; Status:   Prescribed ; Ordered As Mnemonic:   traZODone 50 mg oral tablet ; Simple Display Line:   50 mg, 1 tab(s), po, hs, PRN: for sleep, 90 tab(s), 3 Refill(s) ; Ordering Provider:   Blossom Ramirez MD; Catalog Code:   traZODone ; Order Dt/Tm:   6/17/2020 1:09:41 PM CDT            Social History   Social History   (As Of: 7/1/2021 2:05:11 PM CDT)   Alcohol:  Current      Current, Wine (5 oz), Daily, 2 drinks/episode average.  3 drinks/episode maximum.   (Last Updated: 3/8/2017 1:43:35 PM CST by Crystal Simeon LPN)          Tobacco:  Past      Former smoker, quit more than 30 days ago   (Last Updated: 12/16/2020 1:55:44 PM CST by Randi Finch CMA)          Electronic Cigarette/Vaping:  Denies Electronic Cigarette Use      Electronic Cigarette Use: Never.   (Last Updated: 12/16/2020 1:50:49 PM CST by Randi Finch CMA)          Substance Abuse:        Never   (Last Updated: 12/7/2012 9:40:08 AM CST by Tennille Salmeron CMA)          Employment/School:        Retired, Work/School description: Office Work.   (Last Updated: 3/19/2018 2:10:06 PM CDT by Dorota Medina CMA)          Home/Environment:        Marital status: .  Lives with Self.  2 children.  Living situation: Home/Independent.  Home equipment: Prosthetic Left Hand.  Smoker in household: No.   (Last Updated: 3/19/2018 2:11:14 PM CDT by Dorota Medina CMA)          Nutrition/Health:        Type of diet: Regular.  Caffeine intake amount: Coffee Daily.   (Last Updated: 3/19/2018 2:11:25 PM CDT by Dorota Medina CMA)          Exercise:        Exercise type: Walking.   Comments:  2/14/2014 3:17 PM - Saida Hackett CMA: bad hips/spine   (Last Updated: 2/14/2014 3:17:32 PM CST by Saida Hackett CMA)          Sexual:         Sexually active: No.   Comments:  2/14/2014 3:21 PM - Saida Hackett CMA: Tubal Ligation   (Last Updated: 3/21/2019 12:15:52 PM CDT by Petrona Kaplan)            Geriatric Depression Screening   Geriatric Depression Satisfied Life :   Yes   Geriatric Depression Dropped Activities :   Yes   Geriatric Depression Life Empty :   Yes   Geriatric Depression Bored :   Yes   Geriatric Depression Good Spirits :   Yes   Geriatric Depression Afraid Bad Things :   No   Geriatric Depression Feel Happy :   No   Geriatric Depression Feel Helpless :   Yes   Geriatric Depression Prefer to Stay Home :   No   Geriatric Depression Memory Problems :   No   Geriatric Depression Wonderful Be Alive :   No   Geriatric Depression Feel Worthless :   Yes   Geriatric Depression Situation Hopeless :   No   Geriatric Depression Others Better Off :   Yes   Geriatric Depression Full of Energy :   No   Geriatric Depression Total Score :   9    Astrid Matta 7/1/2021 1:54 PM CDT   Hearing and Vision Screening   Audiogram Result Right Ear :   Pass   Audiogram Result Left Ear :   Pass   Astrid Matta 7/1/2021 1:54 PM CDT   Advance Directives   Advance Directive :   Yes   Astrid Matta 7/1/2021 1:54 PM CDT   Barber Fall Risk   History of Fall in Last 3 Months Barber :   No   Astrid Matta 7/1/2021 1:54 PM CDT   Functional Assessment   Focused Functional Assessment Grid   Bathing :   Independent (2)   Dressing :   Independent (2)   Toileting :   Independent (2)   Transferring Bed or Chair :   Independent (2)   Continence :   Independent (2)   Feeding :   Independent (2)   Astrid Matta 7/1/2021 1:54 PM CDT   ADL Index Score :   12    Capable of Shopping :   Yes   Capable of Walking :   Yes   Capable of Housekeeping :   Yes   Capable of Managing Medications :   Yes   Capable of Handling Finances :   Yes   Astrid Matta 7/1/2021 1:54 PM CDT

## 2022-02-16 NOTE — LETTER
(Inserted Image. Unable to display)   June 18, 2021    MITCHELL KRISHNA  [address] [place] [address]            Dear MITCHELL,      Thank you for selecting [facility] for your healthcare needs.    Our records indicate you are due for the following services:     Medicare Annual Wellness Visit.    Hypertension check ~ please remember to bring your at-home blood pressure readings with you to your appointment.     (FYI   Regarding office visits: In some instances, a video visit or telephone visit may be offered as an option.)      To schedule an appointment or if you have further questions, please contact your clinic at [phone].      Powered by [employer] and [employer]    Sincerely,    Blossom Ramirez MD

## 2022-02-16 NOTE — TELEPHONE ENCOUNTER
---------------------  From: Oneil LITTLE, Debora DURAN (Phone Messages Pool ([id number]_[facility]))   To: KRISHNA DEANN A    Sent: 8/18/2021 1:58:17 PM CDT  Subject: FW: Mobility Scooter     Modesto Zacarias,    This is something that would be best served by scheduling an appointment with Dr. Ramirez. Medicare will need documentation of why you would need the scooter to determine coverage.    Thanks,  Debora FITCH LPN        ---------------------  From: DEANN KRISHNA  To: [facility]  Sent: 08/18/2021 01:50 p.m. CDT  Subject: Mobility Scooter  Modesto Cerrato  Is there anyway I could get a folding electric scooter with a seat covered by insurance? I went to the [facility] on Thursday and have been in severe pain since. They had benches all over that I used except for the walk way to and from the parking lot. I also don t know if I told you but a few months ago I was getting my mail in front of my home and fell down. I had to crawl to my house because I couldn t get up. That didn t help my shoulder at all. I can t use a cane because of neck and shoulder. I can t use a walker due to having only 1 hand/arm that works. My house is small enough so I can get around inside but walking outside is very painful. Sorry this is so long but thought I d give it a shot.    Thank you  Deann

## 2022-02-16 NOTE — NURSING NOTE
Comprehensive Intake Entered On:  6/17/2020 12:04 PM CDT    Performed On:  6/17/2020 12:02 PM CDT by Crissy Forrest CMA               Summary   Chief Complaint :   Phone Visit: HTN Med Check, refills on all meds verbal consent for visit, Isaiahpatrice EMERSON   Advance Directive :   Yes   Menstrual Status :   Postmenopausal   Race :      Languages :   English   Ethnicity :   Not  or    Crissy Forrest CMA - 6/17/2020 12:02 PM CDT   Health Status   Allergies Verified? :   Yes   Medication History Verified? :   Yes   Immunizations Current :   Yes   Medical History Verified? :   Yes   Pre-Visit Planning Status :   Completed   Tobacco Use? :   Former smoker   Crissy Forrest CMA - 6/17/2020 12:02 PM CDT   Consents   Consent for Immunization Exchange :   Consent Granted   Consent for Immunizations to Providers :   Consent Granted   Crissy Forrest CMA - 6/17/2020 12:02 PM CDT   Meds / Allergies   (As Of: 6/17/2020 12:04:30 PM CDT)   Allergies (Active)   Xarelto  Estimated Onset Date:   Unspecified ; Reactions:   Itching ; Created By:   Crystal Simeon LPN; Reaction Status:   Active ; Category:   Drug ; Substance:   Xarelto ; Type:   Allergy ; Updated By:   Crystal Simeon LPN; Reviewed Date:   6/17/2020 12:03 PM CDT        Medication List   (As Of: 6/17/2020 12:04:30 PM CDT)   Prescription/Discharge Order    amLODIPine  :   amLODIPine ; Status:   Prescribed ; Ordered As Mnemonic:   amLODIPine 2.5 mg oral tablet ; Simple Display Line:   1 tab(s), Oral, daily, 90 tab(s), 0 Refill(s) ; Ordering Provider:   Kei Albarran PA-C; Catalog Code:   amLODIPine ; Order Dt/Tm:   3/16/2020 10:02:57 AM CDT          amoxicillin  :   amoxicillin ; Status:   Prescribed ; Ordered As Mnemonic:   amoxicillin 500 mg oral capsule ; Simple Display Line:   2,000 mg, 4 cap(s), Oral, once, given 1 hour prior to the procedure, 20 cap(s), 4 Refill(s) ; Ordering Provider:   Blossom Ramirez MD; Catalog Code:   amoxicillin ; Order Dt/Tm:    3/20/2019 1:28:52 PM CDT          calcium-vitamin D  :   calcium-vitamin D ; Status:   Prescribed ; Ordered As Mnemonic:   Calcium 600+D 600 mg-200 intl units oral tablet ; Simple Display Line:   1 tab(s), PO, qam, 90 tab(s) ; Ordering Provider:   Juli Cain MD; Catalog Code:   calcium-vitamin D ; Order Dt/Tm:   4/2/2014 5:52:48 PM CDT          cholecalciferol  :   cholecalciferol ; Status:   Prescribed ; Ordered As Mnemonic:   Vitamin D3 1000 intl units oral tablet ; Simple Display Line:   1,000 International Unit, 1 tab(s), po, daily, 90 tab(s) ; Ordering Provider:   uJli Cain MD; Catalog Code:   cholecalciferol ; Order Dt/Tm:   4/2/2014 5:52:11 PM CDT          hydroCHLOROthiazide  :   hydroCHLOROthiazide ; Status:   Prescribed ; Ordered As Mnemonic:   hydroCHLOROthiazide 25 mg oral tablet ; Simple Display Line:   1 tab(s), Oral, daily, 90 tab(s), 0 Refill(s) ; Ordering Provider:   Kei Albarran PA-C; Catalog Code:   hydroCHLOROthiazide ; Order Dt/Tm:   3/16/2020 10:02:57 AM CDT          losartan  :   losartan ; Status:   Prescribed ; Ordered As Mnemonic:   losartan 100 mg oral tablet ; Simple Display Line:   1 tab(s), Oral, daily, 90 tab(s), 0 Refill(s) ; Ordering Provider:   Kei Albarran PA-C; Catalog Code:   losartan ; Order Dt/Tm:   3/16/2020 10:02:54 AM CDT          meloxicam  :   meloxicam ; Status:   Prescribed ; Ordered As Mnemonic:   meloxicam 7.5 mg oral tablet ; Simple Display Line:   1 tab(s), Oral, daily, 90 tab(s), 0 Refill(s) ; Ordering Provider:   Kei Albarran PA-C; Catalog Code:   meloxicam ; Order Dt/Tm:   3/16/2020 10:02:56 AM CDT          omeprazole  :   omeprazole ; Status:   Prescribed ; Ordered As Mnemonic:   omeprazole 20 mg oral delayed release capsule ; Simple Display Line:   20 mg, 1 cap(s), PO, Daily, 90 cap(s), 0 Refill(s) ; Ordering Provider:   Kei Albarran PA-C; Catalog Code:   omeprazole ; Order Dt/Tm:   3/16/2020 10:02:54 AM CDT          sertraline  :   sertraline ;  Status:   Prescribed ; Ordered As Mnemonic:   sertraline 100 mg oral tablet ; Simple Display Line:   1 tab(s), Oral, daily, 90 tab(s), 0 Refill(s) ; Ordering Provider:   Kei Albarran PA-C; Catalog Code:   sertraline ; Order Dt/Tm:   3/16/2020 10:02:55 AM CDT          tiZANidine  :   tiZANidine ; Status:   Prescribed ; Ordered As Mnemonic:   tiZANidine 4 mg oral tablet ; Simple Display Line:   1-2 tabs, PO, q8hr, 540 tab(s), 0 Refill(s) ; Ordering Provider:   Kei Albarran PA-C; Catalog Code:   tiZANidine ; Order Dt/Tm:   3/16/2020 10:02:53 AM CDT          traZODone  :   traZODone ; Status:   Prescribed ; Ordered As Mnemonic:   traZODone 50 mg oral tablet ; Simple Display Line:   50 mg, 1 tab(s), po, hs, PRN: for sleep, 90 tab(s), 0 Refill(s) ; Ordering Provider:   Kei Albarran PA-C; Catalog Code:   traZODone ; Order Dt/Tm:   3/16/2020 10:02:55 AM CDT            ID Risk Screen   Recent Travel History :   No recent travel   Family Member Travel History :   No recent travel   Other Exposure to Infectious Disease :   Unknown   Crissy Forrest CMA - 6/17/2020 12:02 PM CDT

## 2022-02-16 NOTE — TELEPHONE ENCOUNTER
---------------------  From: Crissy Forrest CMA (Phone Messages Pool ([id number]_[facility])   To: Blossom Ramirez MD;     Sent: 7/3/2019 12:34:28 PM CDT  Subject: CONSUMER MESSAGE : Headache           ---------------------  From: DEANN KRISHNA  To: [facility]  Sent: 07/03/2019 11:41 a.m. CDT  Subject: Headache  DR Dupree,  Update on my headache. Monday night at 11:15 I got another BAD headache so I took more muscle relaxers and pain pill. I couldn t lay down so I sat up again. I got reallly cold. Then last night I got night sweats. This morning got a not quite as bad headache took pills and had too put a ladd over my eyes earle the light hurt. Then I fell asleep again from the pills sitting up. Any ideas? Hopefully this too shall pass.  Thank you for listening to me whine.  Deann Ram,  If you are having fevers or ongoing night sweats, that could be concerning for an infection. You could follow up in clinic for lab testing for further evaluation, and if it is ever severe, it is not unreasonable to go to the ER.   Fátima Ramirez---------------------  From: Blossom Ramirez MD   To: Phone Messages Pool ([id number]_[facility]); DEANN KRISHNA    Sent: 7/3/2019 12:50:15 PM CDT  Subject: RE: CONSUMER MESSAGE : Headache

## 2022-02-16 NOTE — LETTER
(Inserted Image. Unable to display)     December 18, 2020      MITCHELL KRISHNA  [address], [place] [id number]          Dear MITCHELL,      Thank you for selecting [facility] ([facility] needs.    Our records indicate you are due for the following services:     Fasting Lab Tests ~ Please do not eat or drink anything 10 hours prior to your scheduled appointment time.  (Water and any medications that you may need are allowed unless directed otherwise.)    If you had your labs done at another facility or with Direct Access Lab Testing at [facility], please bring in a copy of the results to your next visit, mail a copy, or drop off a copy of your results to your Healthcare Provider.    (FYI   Regarding office visits: In some instances, a video visit or telephone visit may be offered as an option.)      To schedule an appointment or if you have further questions, please contact your primary clinic:   [facility]       [phone]   [facility]       [phone]              [facility]     [phone]      Powered by [employer]    Sincerely,    Blossom Ramirez MD

## 2022-02-16 NOTE — TELEPHONE ENCOUNTER
---------------------  From: Salvatore/Marlene JAIN (Phone Messages Pool ([id number]_[facility]))   To: DEANN KRISHNA    Sent: 2/24/2021 8:16:17 AM CST  Subject: FW: Covid vaccine, shingles vaccine, records from DR Jose Zacarias,    As of right now, we are giving the covid vaccine to patients 75 plus on a randomized list. If you want covid vaccine updates daily from our clinic, you can call [phone] and listed to our automated system. Also, if you check around to local pharmacies, you may be able to get your covid vaccine faster than you can here at the moment.     As far as your shingles shot, you can get your 2nd dose 2-6 months after you received your first one.     It does look like we received your records from Dr. Garcia's office.    Marlene         ---------------------  From: DEANN KRISHNA  To: [facility]  Sent: 02/23/2021 06:44 p.m. CST  Subject: Covid vaccine, shingles vaccine, records from DR Garcia  Checking to see what s going on with Covid vaccine and I had 1 shingles shot, am I supposed to get 2? Also, I called DR Jose reardon off and they are going to send his reports on me to you. Wondering if they have yet?    Thank you.  Deann

## 2022-02-16 NOTE — TELEPHONE ENCOUNTER
Entered by Randi Finch CMA on October 29, 2020 8:10:24 AM CDT  Alright my dear,    order was faxed today. They should be reaching out to you to get an appointment scheduled and discuss the build of your new prosthetic. Let us know if anything else is needed!    Take angela,    Randi      ---------------------  From: DEANN KRISHNA  To: [employer] Pool ([id number]_[employer])  Sent: 10/28/2020 05:59 p.m. CDT  Subject: RE: Prosthetic hand  Left hand.    Thanks       Addendum by Randi Finch CMA on October 28, 2020 3:01:53 PM CDT  ---------------------  From: Randi Finch CMA ([employer] Pool ([id number]_[facility]))  To: DEANN KRISHNA  Sent: 10/28/2020 3:01:53 PM CDT  Subject: RE: Prosthetic hand  Entered by Randi Finch CMA on October 28, 2020 3:01:48 PM CDT  Modesto Zacarias, I confirmed with [facility] what is needed-can you please let me know which hand you need this for? Thanks!            ---------------------  From: DEANN KRISHNA  To: [employer] Pool ([id number]_[employer])  Sent: 10/27/2020 05:59 p.m. CDT  Subject: RE: Prosthetic hand  Thank you SO much!!  Deann Krishna       Addendum by Randi Finch CMA on October 27, 2020 5:48:28 PM CDT  ---------------------  From: Randi Finch CMA ([employer] Pool ([id number]_[employer]))  To: DEANN KRISHNA  Sent: 10/27/2020 5:48:28 PM CDT  Subject: RE: Prosthetic hand       Addendum by Randi Finch CMA on October 27, 2020 5:48:19 PM CDT  Modesto Zacarias, We would be happy to write an order for you. I will call [facility] tomorrow to get more specifics on what they would like for the order. I will let you know once it is completed.       Take care,       Randi  ---------------------  From: Jayshree Davis RN (Phone Messages Pool ([id number]_[facility]))  To: [facility] Message Pool (32224_[facility][note 2[facility]  Sent: 10/27/2020 5:38:29 PM CDT  Subject: FW: Prosthetic hand                      ---------------------  From: DEANN KRISHNA  To: [facility]  Sent: 10/27/2020 05:35 p.m.  CDT  Subject: Prosthetic hand  Dr Ramirez  I was fitted for a back brace yesterday by [facility] [employer]at the [facility].  They said if I got a perscription from my DR I could get a prosthetic hand so I wouldn t have to wear the halloween hands I ve been wearing.  I would very much like to get one. Would you please get me a perscription ?  Thank you so much!  Deann Krishna  [birth year]---------------------  From: Randi Finch CMA ([employer] Message Pool ([id number]_[facility]))   To: DEANN KRISHNA    Sent: 10/29/2020 8:10:28 AM CDT  Subject: RE: Prosthetic hand

## 2022-02-16 NOTE — NURSING NOTE
Comprehensive Intake Entered On:  7/1/2019 1:35 PM CDT    Performed On:  7/1/2019 1:28 PM CDT by Marlene Gallego                Summary   Temperature Tympanic :   98.7 DegF(Converted to: 37.1 DegC)    Marlene Gallego - 7/1/2019 1:58 PM CDT   Chief Complaint :   pt here c/o severe headaches, getting very dizzy, shakiness, nausea   Advance Directive :   Yes   Menstrual Status :   Postmenopausal   Weight Measured :   182 lb(Converted to: 182 lb 0 oz, 82.55 kg)    Height Measured :   61 in(Converted to: 5 ft 1 in, 154.94 cm)    Body Mass Index :   34.38 kg/m2 (HI)    Body Surface Area :   1.88 m2   Systolic Blood Pressure :   150 mmHg (HI)    Diastolic Blood Pressure :   84 mmHg (HI)    Mean Arterial Pressure :   106 mmHg   Peripheral Pulse Rate :   64 bpm   BP Site :   Right arm   Pulse Site :   Radial artery   BP Method :   Manual   HR Method :   Manual   Race :      Languages :   English   Ethnicity :   Not  or    Marlene Gallego - 7/1/2019 1:28 PM CDT   Health Status   Allergies Verified? :   Yes   Medication History Verified? :   Yes   Immunizations Current :   Yes   Medical History Verified? :   Yes   Pre-Visit Planning Status :   Completed   Tobacco Use? :   Former smoker   Marlene Gallego - 7/1/2019 1:28 PM CDT   Consents   Consent for Immunization Exchange :   Consent Granted   Consent for Immunizations to Providers :   Consent Granted   Marlene Gallego - 7/1/2019 1:28 PM CDT   Meds / Allergies   (As Of: 7/1/2019 1:35:39 PM CDT)   Allergies (Active)   Xarelto  Estimated Onset Date:   Unspecified ; Reactions:   Itching ; Created By:   Crystal Simeon LPN; Reaction Status:   Active ; Category:   Drug ; Substance:   Xarelto ; Type:   Allergy ; Updated By:   Crystal Simeon LPN; Reviewed Date:   7/1/2019 1:31 PM CDT        Medication List   (As Of: 7/1/2019 1:35:39 PM CDT)   Prescription/Discharge Order    meloxicam  :   meloxicam ; Status:   Prescribed ; Ordered As Mnemonic:    meloxicam 7.5 mg oral tablet ; Simple Display Line:   7.5 mg, 1 tab(s), PO, Daily, 30 tab(s), 5 Refill(s) ; Ordering Provider:   Blossom Ramirez MD; Catalog Code:   meloxicam ; Order Dt/Tm:   3/20/2019 1:42:51 PM          losartan  :   losartan ; Status:   Prescribed ; Ordered As Mnemonic:   losartan 100 mg oral tablet ; Simple Display Line:   100 mg, 1 tab(s), PO, Daily, 90 tab(s), 3 Refill(s) ; Ordering Provider:   Blossom Ramirez MD; Catalog Code:   losartan ; Order Dt/Tm:   3/20/2019 1:29:54 PM          traZODone  :   traZODone ; Status:   Prescribed ; Ordered As Mnemonic:   traZODone 50 mg oral tablet ; Simple Display Line:   50 mg, 1 tab(s), po, hs, PRN: for sleep, 90 tab(s), 3 Refill(s) ; Ordering Provider:   Blossom Ramirez MD; Catalog Code:   traZODone ; Order Dt/Tm:   3/20/2019 1:29:54 PM          hydroCHLOROthiazide  :   hydroCHLOROthiazide ; Status:   Prescribed ; Ordered As Mnemonic:   hydroCHLOROthiazide 25 mg oral tablet ; Simple Display Line:   25 mg, 1 tab(s), PO, Daily, 90 tab(s), 3 Refill(s) ; Ordering Provider:   Blossom Ramirez MD; Catalog Code:   hydroCHLOROthiazide ; Order Dt/Tm:   3/20/2019 1:29:53 PM          tiZANidine  :   tiZANidine ; Status:   Prescribed ; Ordered As Mnemonic:   tiZANidine 4 mg oral tablet ; Simple Display Line:   1-2 tabs, PO, q8hr, 30 tab(s), 3 Refill(s) ; Ordering Provider:   Blossom Ramirez MD; Catalog Code:   tiZANidine ; Order Dt/Tm:   3/20/2019 1:29:53 PM          omeprazole  :   omeprazole ; Status:   Prescribed ; Ordered As Mnemonic:   omeprazole 20 mg oral delayed release capsule ; Simple Display Line:   20 mg, 1 cap(s), PO, Daily, 90 cap(s), 3 Refill(s) ; Ordering Provider:   Blossom Ramirez MD; Catalog Code:   omeprazole ; Order Dt/Tm:   3/20/2019 1:29:52 PM          sertraline  :   sertraline ; Status:   Prescribed ; Ordered As Mnemonic:   sertraline 100 mg oral tablet ; Simple Display Line:   100 mg, 1 tab(s), PO, Daily, 90 tab(s), 3 Refill(s) ;  Ordering Provider:   Blossom Ramirez MD; Catalog Code:   sertraline ; Order Dt/Tm:   3/20/2019 1:29:52 PM          amLODIPine  :   amLODIPine ; Status:   Prescribed ; Ordered As Mnemonic:   amLODIPine 2.5 mg oral tablet ; Simple Display Line:   2.5 mg, 1 tab(s), PO, Daily, 90 tab(s), 3 Refill(s) ; Ordering Provider:   Blossom Ramirez MD; Catalog Code:   amLODIPine ; Order Dt/Tm:   3/20/2019 1:28:37 PM          amoxicillin  :   amoxicillin ; Status:   Prescribed ; Ordered As Mnemonic:   amoxicillin 500 mg oral capsule ; Simple Display Line:   2,000 mg, 4 cap(s), Oral, once, given 1 hour prior to the procedure, 20 cap(s), 4 Refill(s) ; Ordering Provider:   Blossom Ramirez MD; Catalog Code:   amoxicillin ; Order Dt/Tm:   3/20/2019 1:28:52 PM          calcium-vitamin D  :   calcium-vitamin D ; Status:   Prescribed ; Ordered As Mnemonic:   Calcium 600+D 600 mg-200 intl units oral tablet ; Simple Display Line:   1 tab(s), PO, qam, 90 tab(s) ; Ordering Provider:   Juli Cain MD; Catalog Code:   calcium-vitamin D ; Order Dt/Tm:   4/2/2014 5:52:48 PM          cholecalciferol  :   cholecalciferol ; Status:   Prescribed ; Ordered As Mnemonic:   Vitamin D3 1000 intl units oral tablet ; Simple Display Line:   1,000 International Unit, 1 tab(s), po, daily, 90 tab(s) ; Ordering Provider:   Juli Cain MD; Catalog Code:   cholecalciferol ; Order Dt/Tm:   4/2/2014 5:52:11 PM            Home Meds    mupirocin topical  :   mupirocin topical ; Status:   Processing ; Ordered As Mnemonic:   mupirocin 2% topical cream ; Action Display:   Complete ; Catalog Code:   mupirocin topical ; Order Dt/Tm:   7/1/2019 1:31:40 PM

## 2022-02-16 NOTE — TELEPHONE ENCOUNTER
Entered by Stephanie Saenz MA on April 09, 2020 1:57:18 PM CDT  ---------------------  From: Stephanie Saenz MA   To: [facility] #[id number]    Sent: 4/9/2020 1:57:18 PM CDT  Subject: Medication Management     ** Not Approved: Patient should contact Prescriber first **  amoxicillin (AMOXICILLIN 500MG CAPSULES)  TAKE 4 CAPSULES BY MOUTH 1 TIME GIVEN 1 HOUR BEFORE THE PROCEDURE  Qty:  20 cap(s)        Days Supply:  5        Refills:  0          Substitutions Allowed     Route To Pharmacy - [facility] #[id number]   Signed by Stephanie Saenz MA            ------------------------------------------  From: [facility] #[id number]  To: Blossom Ramirez MD  Sent: April 9, 2020 1:31:48 PM CDT  Subject: Medication Management  Due: April 10, 2020 1:31:48 PM CDT    ** On Hold Pending Signature **  Drug: amoxicillin (amoxicillin 500 mg oral capsule)  TAKE 4 CAPSULES BY MOUTH 1 TIME GIVEN 1 HOUR BEFORE THE PROCEDURE  Quantity: 20 cap(s)  Days Supply: 5  Refills: 0  Substitutions Allowed  Notes from Pharmacy:     Dispensed Drug: amoxicillin (amoxicillin 500 mg oral capsule)  TAKE 4 CAPSULES BY MOUTH 1 TIME GIVEN 1 HOUR BEFORE THE PROCEDURE  Quantity: 20 cap(s)  Days Supply: 5  Refills: 0  Substitutions Allowed  Notes from Pharmacy:   ------------------------------------------
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
## 2022-02-16 NOTE — LETTER
(Inserted Image. Unable to display)   [address] [place] [address]  March 21, 2019      MITCHELL KRISHNA      [address], [place] [id number]      Dear MITCHELL,    Thank you for selecting [facility] for your healthcare needs. Below you will find the results of the recent tests done at our clinic.     Your lab results are all normal. Please let me know if you have questions.    Result Name Current Result Previous Result Reference Range   Sodium Level (mmol/L)  135 3/20/2019 ((L)) 133 7/27/2018   139 7/11/2017 135 - 146   Potassium Level (mmol/L)  4.2 3/20/2019  5.0 7/27/2018   4.5 7/11/2017 3.5 - 5.3   Chloride Level (mmol/L)  100 3/20/2019  98 7/27/2018   106 7/11/2017 98 - 110   CO2 Level (mmol/L)  26 3/20/2019  27 7/27/2018   23 7/11/2017 20 - 32   Glucose Level (mg/dL)  92 3/20/2019  91 7/27/2018   88 7/11/2017 65 - 99   BUN (mg/dL)  19 3/20/2019  20 7/27/2018   15 7/11/2017 7 - 25   Creatinine Level (mg/dL)  0.91 3/20/2019 ((H)) 1.04 7/27/2018  ((H)) 1.07 7/11/2017 0.50 - 0.99   Calcium Level (mg/dL)  9.5 3/20/2019  10.0 7/27/2018   9.5 7/11/2017 8.6 - 10.4   WBC  6.7 3/20/2019  5.5 7/11/2017 3.8 - 10.8   RBC  4.50 3/20/2019  4.51 7/11/2017 3.80 - 5.10   Hgb (gm/dL)  13.7 3/20/2019  13.7 7/11/2017 11.7 - 15.5   Hct (%)  40.6 3/20/2019  41.4 7/11/2017 35.0 - 45.0   MCV (fL)  90.2 3/20/2019  91.8 7/11/2017 80.0 - 100.0   MCH (pg)  30.4 3/20/2019  30.4 7/11/2017 27.0 - 33.0   MCHC (gm/dL)  33.7 3/20/2019  33.1 7/11/2017 32.0 - 36.0   RDW (%)  12.5 3/20/2019  13.1 7/11/2017 11.0 - 15.0   Platelet  355 3/20/2019  343 7/11/2017 140 - 400   MPV (fL)  9.7 3/20/2019  9.1 7/11/2017 7.5 - 12.5       Please contact me or my assistant at [phone] if you have any questions or concerns.     Sincerely,        Blossom Ramirez M.D.

## 2022-02-17 ENCOUNTER — AMBULATORY - RIVER FALLS (OUTPATIENT)
Dept: FAMILY MEDICINE | Facility: CLINIC | Age: 72
End: 2022-02-17

## 2022-02-17 ENCOUNTER — LAB REQUISITION (OUTPATIENT)
Dept: LAB | Facility: CLINIC | Age: 72
End: 2022-02-17
Payer: MEDICARE

## 2022-02-17 DIAGNOSIS — U07.1 COVID-19: ICD-10-CM

## 2022-02-17 PROCEDURE — U0003 INFECTIOUS AGENT DETECTION BY NUCLEIC ACID (DNA OR RNA); SEVERE ACUTE RESPIRATORY SYNDROME CORONAVIRUS 2 (SARS-COV-2) (CORONAVIRUS DISEASE [COVID-19]), AMPLIFIED PROBE TECHNIQUE, MAKING USE OF HIGH THROUGHPUT TECHNOLOGIES AS DESCRIBED BY CMS-2020-01-R: HCPCS | Mod: ORL | Performed by: FAMILY MEDICINE

## 2022-02-18 LAB — SARS-COV-2 RNA RESP QL NAA+PROBE: NEGATIVE

## 2022-02-21 LAB — SARS-COV-2 RNA RESP QL NAA+PROBE: NEGATIVE

## 2022-03-02 VITALS
BODY MASS INDEX: 34.31 KG/M2 | HEART RATE: 77 BPM | SYSTOLIC BLOOD PRESSURE: 124 MMHG | DIASTOLIC BLOOD PRESSURE: 76 MMHG | OXYGEN SATURATION: 98 % | WEIGHT: 181.6 LBS

## 2022-03-02 NOTE — NURSING NOTE
Patient is called and informed that COVID-19 test result is NEGATIVE        _Pt reports that they are not having symptoms.  Pt needed test for travel  They are encouraged to mask in public places as they may be contagious and consider retesting if symptoms don t improve. They should make an appointment if they have concerns.  Suggested retesting in 3-5 if having sx.    Patient declines a visit at this time.

## 2022-03-02 NOTE — NURSING NOTE
Quick Intake Entered On:  2/9/2022 12:15 PM CST    Performed On:  2/9/2022 12:15 PM CST by Blossom Ramirez MD               Summary   Advance Directive :   Yes   Menstrual Status :   Postmenopausal   Systolic Blood Pressure :   124 mmHg   Diastolic Blood Pressure :   76 mmHg   Mean Arterial Pressure :   92 mmHg   Race :      Languages :   English   Ethnicity :   Not  or    Blossom Ramirez MD - 2/9/2022 12:15 PM CST

## 2022-03-02 NOTE — TELEPHONE ENCOUNTER
---------------------  From: DEANN KRISHNA  To: [facility] [[email or web address]]  Sent: 02/14/2022 04:14 p.m. CST  Subject: RE: FW: FW: Blood Test results  I will. Thank you!  ---------------------  From: Natalie Read RN (Phone Messages Pool ([id number]_[facility]))  To: DEANN KRISHNA  Sent: 2/14/2022 4:02:41 PM CST  Subject: FW: FW: Blood Test results  ???  Yes, you can take two of the 25mg tablets of the HCTZ while you are traveling. Consider having a check in call with Dr. Ramirez after your travels, to see how you tolerated the increased dose and to determine the dose of the HCTZ going forward.  ???  ???  ---------------------  From: DEANN KRISHNA  To: [facility] [[email or web address]]  Sent: 02/14/2022 03:36 p.m. CST  Subject: RE: FW: Blood Test results  I have some 25MG tablets. Should I just take 2 a day when traveling. I leave Sunday. I will run short of tablets by end of cycle then because I?m on an automatic fill.  Please advise.  Thank you  Deann  ---------------------  From: Natalie Read RN (Phone Messages Pool ([id number]_[facility])  To: DEANN KRISHNA  Sent: 2/14/2022 3:07:02 PM CST  Subject: FW: Blood Test results  ???  Deann Interiano,  ???  Dr. Ramirez has mailed out a results letter to you, that I have attached to this message.  She states in the letter:  Dear DEANN,  ???  Thank you for selecting [facility] for your healthcare needs. Below you will find the results of the recent tests done at our clinic.  ???  Labs are all stable. We could try increasing the dose of the HCTZ to 50mg when you are traveling to see if that helps keep blood pressure down and decreases the swelling. Let me know what you think about that plan.  ???  Please let Dr. Ramirez know if you?d like to try increasing the dose of the HCTZ. Please let us know if you will need a refill of this medication as well.  ???  Thank you,  Natalie MICHELE RN  ---------------------  From: DEANN KRISHNA  To: [facility]  Sent: 02/14/2022 02:53 p.m. CST  Subject: Blood Test results  Hi I was in to see DR Ramirez last Wednesday. She ran blood tests and if they were ok she was going to prescribe a water pill. What has she decided?  Thank you,  Deann Shah

## 2022-03-02 NOTE — NURSING NOTE
Comprehensive Intake Entered On:  2/9/2022 11:47 AM CST    Performed On:  2/9/2022 11:39 AM CST by Bree Gagnon CMA               Summary   Chief Complaint :   Increased edema in bilateal lower legs and feet x1 week. Right shoulder pain also.   Advance Directive :   Yes   Menstrual Status :   Postmenopausal   Weight Measured :   181.6 lb(Converted to: 181 lb 10 oz, 82.372 kg)    Systolic Blood Pressure :   151 mmHg (HI)    Diastolic Blood Pressure :   80 mmHg   Mean Arterial Pressure :   104 mmHg   Peripheral Pulse Rate :   77 bpm   BP Site :   Right arm   BP Method :   Electronic   Oxygen Saturation :   98 %   Race :      Languages :   English   Ethnicity :   Not  or    Bree Gagnon CMA - 2/9/2022 11:39 AM CST   Health Status   Allergies Verified? :   Yes   Medication History Verified? :   Yes   Immunizations Current :   Yes   Medical History Verified? :   Yes   Pre-Visit Planning Status :   Completed   Tobacco Use? :   Former smoker   Bree Gagnon CMA - 2/9/2022 11:39 AM CST   Consents   Consent for Immunization Exchange :   Consent Granted   Consent for Immunizations to Providers :   Consent Granted   Bree Gagnon CMA - 2/9/2022 11:39 AM CST   Meds / Allergies   (As Of: 2/9/2022 11:47:34 AM CST)   Allergies (Active)   Dust  Estimated Onset Date:   Unspecified ; Created By:   Petrona Kaplan; Reaction Status:   Active ; Category:   Environment ; Substance:   Dust ; Type:   Allergy ; Updated By:   Petrona Kaplan; Reviewed Date:   2/9/2022 11:42 AM CST      Xarelto  Estimated Onset Date:   Unspecified ; Reactions:   Itching ; Created By:   Crystal Simeon LPN; Reaction Status:   Active ; Category:   Drug ; Substance:   Xarelto ; Type:   Allergy ; Updated By:   Crystal Simeon LPN; Reviewed Date:   2/9/2022 11:42 AM CST        Medication List   (As Of: 2/9/2022 11:47:34 AM CST)   Prescription/Discharge Order    amLODIPine  :   amLODIPine ; Status:   Prescribed ; Ordered As Mnemonic:    amLODIPine 2.5 mg oral tablet ; Simple Display Line:   1 tab(s), Oral, daily, 90 tab(s), 1 Refill(s) ; Ordering Provider:   Blossom Ramirez MD; Catalog Code:   amLODIPine ; Order Dt/Tm:   11/30/2021 12:08:11 PM CST          amoxicillin  :   amoxicillin ; Status:   Prescribed ; Ordered As Mnemonic:   amoxicillin 500 mg oral capsule ; Simple Display Line:   2,000 mg, 4 cap(s), Oral, once, given 1 hour prior to the procedure, 4 cap(s), 4 Refill(s) ; Ordering Provider:   Blossom Ramirez MD; Catalog Code:   amoxicillin ; Order Dt/Tm:   6/17/2020 1:11:53 PM CDT          calcium-vitamin D  :   calcium-vitamin D ; Status:   Prescribed ; Ordered As Mnemonic:   Calcium 600+D 600 mg-200 intl units oral tablet ; Simple Display Line:   1 tab(s), PO, qam, 90 tab(s) ; Ordering Provider:   Juli Cain MD; Catalog Code:   calcium-vitamin D ; Order Dt/Tm:   4/2/2014 5:52:48 PM CDT          cholecalciferol  :   cholecalciferol ; Status:   Prescribed ; Ordered As Mnemonic:   Vitamin D3 1000 intl units oral tablet ; Simple Display Line:   1,000 International Unit, 1 tab(s), po, daily, 90 tab(s) ; Ordering Provider:   Juli Cain MD; Catalog Code:   cholecalciferol ; Order Dt/Tm:   4/2/2014 5:52:11 PM CDT          escitalopram  :   escitalopram ; Status:   Prescribed ; Ordered As Mnemonic:   escitalopram 10 mg oral tablet ; Simple Display Line:   10 mg, 1 tab(s), Oral, daily, 90 tab(s), 3 Refill(s) ; Ordering Provider:   Blossom Ramirez MD; Catalog Code:   escitalopram ; Order Dt/Tm:   7/1/2021 2:40:06 PM CDT          gabapentin  :   gabapentin ; Status:   Prescribed ; Ordered As Mnemonic:   gabapentin 300 mg oral capsule ; Simple Display Line:   300 mg, 1 cap(s), Oral, hs, 90 cap(s), 1 Refill(s) ; Ordering Provider:   Blossom Ramirez MD; Catalog Code:   gabapentin ; Order Dt/Tm:   6/3/2021 11:12:41 AM CDT          hydroCHLOROthiazide  :   hydroCHLOROthiazide ; Status:   Prescribed ; Ordered As Mnemonic:   hydroCHLOROthiazide 25  mg oral tablet ; Simple Display Line:   1 tab(s), Oral, daily, 90 tab(s), 1 Refill(s) ; Ordering Provider:   Blossom Ramirez MD; Catalog Code:   hydroCHLOROthiazide ; Order Dt/Tm:   11/30/2021 12:08:12 PM CST          hydrOXYzine  :   hydrOXYzine ; Status:   Prescribed ; Ordered As Mnemonic:   hydrOXYzine hydrochloride 25 mg oral tablet ; Simple Display Line:   25 mg, 1 tab(s), Oral, qid, PRN: for itching, 40 tab(s), 1 Refill(s) ; Ordering Provider:   Blossom Ramirez MD; Catalog Code:   hydrOXYzine ; Order Dt/Tm:   6/3/2021 1:52:45 PM CDT          losartan  :   losartan ; Status:   Prescribed ; Ordered As Mnemonic:   losartan 100 mg oral tablet ; Simple Display Line:   1 tab(s), Oral, daily, 90 tab(s), 1 Refill(s) ; Ordering Provider:   Blossom Ramirez MD; Catalog Code:   losartan ; Order Dt/Tm:   11/30/2021 12:08:12 PM CST          meloxicam  :   meloxicam ; Status:   Prescribed ; Ordered As Mnemonic:   meloxicam 7.5 mg oral tablet ; Simple Display Line:   See Instructions, TAKE 1 TABLET BY MOUTH DAILY AS NEEDED FOR JOINT PAIN, 90 tab(s), 1 Refill(s) ; Ordering Provider:   Blossom Ramirez MD; Catalog Code:   meloxicam ; Order Dt/Tm:   6/3/2021 11:12:03 AM CDT          meloxicam  :   meloxicam ; Status:   Prescribed ; Ordered As Mnemonic:   meloxicam 7.5 mg oral tablet ; Simple Display Line:   1 tab(s), Oral, daily, PRN: joint pain, 90 tab(s), 3 Refill(s) ; Ordering Provider:   Blossom Ramirez MD; Catalog Code:   meloxicam ; Order Dt/Tm:   6/17/2020 1:08:36 PM CDT          omeprazole  :   omeprazole ; Status:   Prescribed ; Ordered As Mnemonic:   omeprazole 20 mg oral delayed release capsule ; Simple Display Line:   1 cap(s), Oral, daily, 90 cap(s), 1 Refill(s) ; Ordering Provider:   Blossom Ramirez MD; Catalog Code:   omeprazole ; Order Dt/Tm:   11/30/2021 12:08:11 PM CST          sertraline  :   sertraline ; Status:   Prescribed ; Ordered As Mnemonic:   sertraline 100 mg oral tablet ; Simple Display Line:    See Instructions, 0.5 tab x 1 week then stop, 90 tab(s), 1 Refill(s) ; Ordering Provider:   Blossom Ramirez MD; Catalog Code:   sertraline ; Order Dt/Tm:   6/3/2021 11:12:03 AM CDT          tiZANidine  :   tiZANidine ; Status:   Prescribed ; Ordered As Mnemonic:   tiZANidine 4 mg oral tablet ; Simple Display Line:   1-2 tabs, PO, q8hr, 540 tab(s), 0 Refill(s) ; Ordering Provider:   Kei Albarran PA-C; Catalog Code:   tiZANidine ; Order Dt/Tm:   3/16/2020 10:02:53 AM CDT          traZODone  :   traZODone ; Status:   Prescribed ; Ordered As Mnemonic:   traZODone 50 mg oral tablet ; Simple Display Line:   See Instructions, TAKE 1 TABLET BY MOUTH AT BEDTIME AS NEEDED FOR SLEEP, 90 tab(s), 1 Refill(s) ; Ordering Provider:   Blossom Ramirez MD; Catalog Code:   traZODone ; Order Dt/Tm:   6/3/2021 11:12:04 AM CDT          traZODone  :   traZODone ; Status:   Prescribed ; Ordered As Mnemonic:   traZODone 50 mg oral tablet ; Simple Display Line:   50 mg, 1 tab(s), po, hs, PRN: for sleep, 90 tab(s), 3 Refill(s) ; Ordering Provider:   Blossom Ramirez MD; Catalog Code:   traZODone ; Order Dt/Tm:   6/17/2020 1:09:41 PM CDT            Social History   Social History   (As Of: 2/9/2022 11:47:34 AM CST)   Alcohol:  Current      Current, Beer (12 oz), Wine (5 oz), Liquor (Hard) (1.5 oz), 3-5 times per week, 2 drinks/episode average.  3 drinks/episode maximum.   (Last Updated: 7/6/2021 2:36:43 PM CDT by Crystal Reyes)          Tobacco:  Past      Former smoker, quit more than 30 days ago   (Last Updated: 12/16/2020 1:55:44 PM CST by Randi Finch CMA)   Former smoker, quit more than 30 days ago   (Last Updated: 2/9/2022 11:42:33 AM CST by Bree Gagnon CMA)          Electronic Cigarette/Vaping:  Denies Electronic Cigarette Use      Electronic Cigarette Use: Never.   (Last Updated: 12/16/2020 1:50:49 PM CST by Randi Finch CMA)   Electronic Cigarette Use: Never.   (Last Updated: 2/9/2022 11:42:37 AM CST by Bree Gagnon CMA)           Substance Abuse:        Never   (Last Updated: 12/7/2012 9:40:08 AM CST by Tennille Salmeron CMA)          Employment/School:        Retired, Work/School description: Office Work.   (Last Updated: 3/19/2018 2:10:06 PM CDT by Dorota Medina CMA)          Home/Environment:        Marital status: .  Lives with Self.  2 children.  Living situation: Home/Independent.  Home equipment: Prosthetic Left Hand.  Smoker in household: No.   (Last Updated: 3/19/2018 2:11:14 PM CDT by Dorota Medina CMA)          Nutrition/Health:        Type of diet: Regular.  Caffeine intake amount: Coffee Daily.  Wants to lose weight: Yes.  Sleeping concerns: Yes.  Feels highly stressed: Yes.   (Last Updated: 7/6/2021 2:37:44 PM CDT by Crystal Reyes)          Exercise:        Exercise type: Walking.   Comments:  2/14/2014 3:17 PM - Saida Hackett CMA: bad hips/spine   (Last Updated: 2/14/2014 3:17:32 PM CST by Saida Hackett CMA)          Sexual:        Sexually active: No.   Comments:  2/14/2014 3:21 PM - Saida Hackett CMA: Tubal Ligation   (Last Updated: 3/21/2019 12:15:52 PM CDT by Petrona Kaplan)

## 2022-03-02 NOTE — TELEPHONE ENCOUNTER
---------------------  From: Natalie Read RN (Phone Messages Pool ([id number]_[facility]))   To: DEANN KRISHNA    Sent: 2/14/2022 3:07:02 PM CST  Subject: FW: Blood Test results     Deann Interiano,     Dr. Ramirez has mailed out a results letter to you, that I have attached to this message.   She states in the letter:   Dear DEANN,    Thank you for selecting [facility] for your healthcare needs. Below you will find the results of the recent tests done at our clinic.     Labs are all stable. We could try increasing the dose of the HCTZ to 50mg when you are traveling to see if that helps keep blood pressure down and decreases the swelling. Let me know what you think about that plan.    Please let Dr. Ramirez know if you'd like to try increasing the dose of the HCTZ. Please let us know if you will need a refill of this medication as well.    Thank you,  Natalie MICHELE RN  ---------------------  From: DEANN KRISHNA  To: [facility]  Sent: 02/14/2022 02:53 p.m. CST  Subject: Blood Test results  Hi I was in to see DR Ramirez last Wednesday. She ran blood tests and if they were ok she was going to prescribe a water pill. What has she decided?    Thank you,  Deann Krishna

## 2022-03-02 NOTE — LETTER
(Inserted Image. Unable to display)   [address] [place] [address] [phone] (phone) [phone] (fax)  February 10, 2022      MITCHELL KRISHNA      [address] [place] [address]      Dear MITCHELL,    Thank you for selecting [facility] for your healthcare needs. Below you will find the results of the recent tests done at our clinic.     Labs are all stable. We could try increasing the dose of the HCTZ to 50mg when you are traveling to see if that helps keep blood pressure down and decreases the swelling. Let me know what you think about that plan.    Result Name Current Result Previous Result Reference Range   Glucose Level (mg/dL) ((H)) 100 2/9/2022  87 5/13/2021 65 - 99   BUN (mg/dL)  18 2/9/2022  18 5/13/2021 7 - 25   Creatinine Level (mg/dL) ((H)) 1.14 2/9/2022 ((H)) 1.11 5/13/2021 0.60 - 0.93   eGFR (mL/min/1.73m2) ((L)) 48 2/9/2022 ((L)) 50 5/13/2021 > OR = 60 -    BUN/Creat Ratio  16 2/9/2022  16 5/13/2021 6 - 22   Sodium Level (mmol/L)  136 2/9/2022  137 5/13/2021 135 - 146   Potassium Level (mmol/L)  3.9 2/9/2022  4.4 5/13/2021 3.5 - 5.3   Chloride Level (mmol/L)  99 2/9/2022  100 5/13/2021 98 - 110   CO2 Level (mmol/L)  27 2/9/2022  27 5/13/2021 20 - 32   Calcium Level (mg/dL)  10.0 2/9/2022  9.5 5/13/2021 8.6 - 10.4   WBC  6.3 2/9/2022  7.7 5/13/2021 3.8 - 10.8   RBC  4.43 2/9/2022  4.46 5/13/2021 3.80 - 5.10   Hgb (gm/dL)  13.8 2/9/2022  14.0 5/13/2021 11.7 - 15.5   Hct (%)  41.0 2/9/2022  40.9 5/13/2021 35.0 - 45.0   MCV (fL)  92.6 2/9/2022  91.7 5/13/2021 80.0 - 100.0   MCH (pg)  31.2 2/9/2022  31.4 5/13/2021 27.0 - 33.0   MCHC (gm/dL)  33.7 2/9/2022  34.2 5/13/2021 32.0 - 36.0   RDW (%)  12.8 2/9/2022  13.1 5/13/2021 11.0 - 15.0   Platelet  389 2/9/2022  400 5/13/2021 140 - 400   MPV (fL)  9.2 2/9/2022  8.9 5/13/2021 7.5 - 12.5   Abs Neutrophils  3,522 2/9/2022  1,500 - 7,800   Abs Lymphocytes  2,211 2/9/2022   850 - 3,900   Abs Monocytes  510 2/9/2022  200 - 950   Abs Eosinophils  38 2/9/2022  15 - 500   Abs Basophils  19 2/9/2022  0 - 200   Neutrophils (%)  55.9 2/9/2022     Lymphocytes (%)  35.1 2/9/2022     Monocytes (%)  8.1 2/9/2022     Eosinophils (%)  0.6 2/9/2022     Basophils (%)  0.3 2/9/2022         Please contact me or my assistant at [phone] if you have any questions or concerns.     Sincerely,        Blossom Ramirez M.D.
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
## 2022-03-02 NOTE — TELEPHONE ENCOUNTER
---------------------  From: Natalie Read RN (Phone Messages Pool ([id number]_[facility])   To: DEANN KRISHNA    Sent: 2/14/2022 4:02:41 PM CST  Subject: FW: FW: Blood Test results     Yes, you can take two of the 25mg tablets of the HCTZ while you are traveling. Consider having a check in call with Dr. Ramirez after your travels, to see how you tolerated the increased dose and to determine the dose of the HCTZ going forward.       ---------------------  From: DEANN KRISHNA  To: [facility]  Sent: 02/14/2022 03:36 p.m. CST  Subject: RE: FW: Blood Test results  I have some 25MG tablets. Should I just take 2 a day when traveling. I leave Sunday. I will run short of tablets by end of cycle then because I m on an automatic fill.  Please advise.    Thank you    Deann  ---------------------  From: Natalie Read RN (Phone Messages Pool ([id number]_[facility])  To: DEANN KRISHNA  Sent: 2/14/2022 3:07:02 PM CST  Subject: FW: Blood Test results       Deann Interiano Dr. Lijewski has mailed out a results letter to you, that I have attached to this message.  She states in the letter:  Dear DEANN,       Thank you for selecting [facility] for your healthcare needs. Below you will find the results of the recent tests done at our clinic.       Labs are all stable. We could try increasing the dose of the HCTZ to 50mg when you are traveling to see if that helps keep blood pressure down and decreases the swelling. Let me know what you think about that plan.       Please let Dr. Ramirez know if you d like to try increasing the dose of the HCTZ. Please let us know if you will need a refill of this medication as well.       Thank you,  Natalie MICHELE RN  ---------------------  From: DEANN KRISHNA  To: [facility]  Sent: 02/14/2022 02:53 p.m. CST  Subject: Blood Test results  Hi I was in to see DR Ramirez last Wednesday. She ran blood tests and if they were ok she was  going to prescribe a water pill. What has she decided?  Thank you,  Deann Shah

## 2022-03-11 ENCOUNTER — TRANSFERRED RECORDS (OUTPATIENT)
Dept: HEALTH INFORMATION MANAGEMENT | Facility: CLINIC | Age: 72
End: 2022-03-11
Payer: MEDICARE

## 2022-03-29 ENCOUNTER — TRANSFERRED RECORDS (OUTPATIENT)
Dept: HEALTH INFORMATION MANAGEMENT | Facility: CLINIC | Age: 72
End: 2022-03-29
Payer: MEDICARE

## 2022-05-26 DIAGNOSIS — I10 HTN (HYPERTENSION): Primary | ICD-10-CM

## 2022-05-26 DIAGNOSIS — K21.9 CHRONIC GERD: ICD-10-CM

## 2022-05-27 RX ORDER — LOSARTAN POTASSIUM 100 MG/1
100 TABLET ORAL DAILY
Qty: 90 TABLET | Refills: 3 | Status: SHIPPED | OUTPATIENT
Start: 2022-05-27 | End: 2023-05-17

## 2022-05-27 RX ORDER — HYDROCHLOROTHIAZIDE 25 MG/1
25 TABLET ORAL DAILY
Qty: 90 TABLET | Refills: 3 | Status: SHIPPED | OUTPATIENT
Start: 2022-05-27 | End: 2023-05-15

## 2022-05-27 RX ORDER — AMLODIPINE BESYLATE 2.5 MG/1
2.5 TABLET ORAL DAILY
Qty: 90 TABLET | Refills: 3 | Status: SHIPPED | OUTPATIENT
Start: 2022-05-27 | End: 2023-05-15

## 2022-05-27 NOTE — TELEPHONE ENCOUNTER
Last Written Prescription Date: 11/30/21   Last Fill Quantity: 90,  # refills: 1   Last office visit with prescribing provider: 2/9/22 2/9/22 ABDIFATAH ROSAS

## 2022-06-06 DIAGNOSIS — F41.1 GAD (GENERALIZED ANXIETY DISORDER): Primary | ICD-10-CM

## 2022-06-09 RX ORDER — ESCITALOPRAM OXALATE 10 MG/1
10 TABLET ORAL DAILY
Qty: 90 TABLET | Refills: 0 | Status: SHIPPED | OUTPATIENT
Start: 2022-06-09 | End: 2022-08-23

## 2022-06-09 NOTE — TELEPHONE ENCOUNTER
Prescription approved per [facility] Refill Protocol.    Last Written Prescription Date:  7/1/21  Last Fill Quantity: 90,  # refills: 3   Last office visit: 2/9/22  Future Office Visit:

## 2022-06-16 NOTE — PROCEDURES
Accession Number:       [id number]  PATHOLOGIST:     Neelima Choi M.D., Board Certified in Anatomic Pathology, Clinical Pathology and Cytopathology 5  (electronic signature)  A SOURCE:     Skin, right shin  A GROSS DESCRIPTION:     See comment       Specimen is received in formalin, labeled with       multiple patient identifier(s) and consists of       one piece from a skin punch biopsy measuring 0.3       x 0.3 x 0.3 cm, circular in shape and tan-gray in       color, with a pigmented area measuring 0.2 x 0.2       cm and yellow-white in color. The margins are       inked green. The specimen is bisected and       entirely submitted in one cassette(s).         Gross exam(s) performed at: [address] [address]         : ANCA MAI MD  A DIAGNOSIS:     Squamous cell carcinoma, extending to peripheral margin.  A COMMENT:     Complete excision is recommended.

## 2022-07-11 ENCOUNTER — OFFICE VISIT (OUTPATIENT)
Dept: FAMILY MEDICINE | Facility: CLINIC | Age: 72
End: 2022-07-11
Payer: MEDICARE

## 2022-07-11 DIAGNOSIS — M25.511 CHRONIC RIGHT SHOULDER PAIN: ICD-10-CM

## 2022-07-11 DIAGNOSIS — Z00.00 ENCOUNTER FOR MEDICARE ANNUAL WELLNESS EXAM: Primary | ICD-10-CM

## 2022-07-11 DIAGNOSIS — N18.31 CHRONIC KIDNEY DISEASE, STAGE 3A (H): ICD-10-CM

## 2022-07-11 DIAGNOSIS — R13.19 OTHER DYSPHAGIA: ICD-10-CM

## 2022-07-11 DIAGNOSIS — G89.29 OTHER CHRONIC PAIN: ICD-10-CM

## 2022-07-11 DIAGNOSIS — G89.29 CHRONIC RIGHT SHOULDER PAIN: ICD-10-CM

## 2022-07-11 DIAGNOSIS — K04.7 DENTAL INFECTION: ICD-10-CM

## 2022-07-11 DIAGNOSIS — M25.551 HIP PAIN, RIGHT: ICD-10-CM

## 2022-07-11 PROBLEM — R61 GENERALIZED HYPERHIDROSIS: Status: ACTIVE | Noted: 2022-07-11

## 2022-07-11 PROBLEM — G47.00 INSOMNIA: Status: ACTIVE | Noted: 2019-10-16

## 2022-07-11 PROBLEM — I10 HYPERTENSION: Status: ACTIVE | Noted: 2019-10-16

## 2022-07-11 PROBLEM — M48.02 CERVICAL STENOSIS OF SPINAL CANAL: Status: ACTIVE | Noted: 2019-10-16

## 2022-07-11 PROBLEM — Z85.828 HISTORY OF SQUAMOUS CELL CARCINOMA OF SKIN: Status: ACTIVE | Noted: 2022-07-11

## 2022-07-11 PROBLEM — K21.9 GASTROESOPHAGEAL REFLUX DISEASE: Status: ACTIVE | Noted: 2022-07-11

## 2022-07-11 PROBLEM — M85.80 OSTEOPENIA: Status: ACTIVE | Noted: 2022-07-11

## 2022-07-11 PROBLEM — M19.90 ARTHRITIS: Status: ACTIVE | Noted: 2019-10-16

## 2022-07-11 PROBLEM — M19.011 OSTEOARTHRITIS OF RIGHT SHOULDER: Status: ACTIVE | Noted: 2021-02-12

## 2022-07-11 PROBLEM — F41.1 GENERALIZED ANXIETY DISORDER: Status: ACTIVE | Noted: 2022-07-11

## 2022-07-11 PROBLEM — F32.A DEPRESSIVE DISORDER: Status: ACTIVE | Noted: 2022-07-11

## 2022-07-11 LAB
AMPHETAMINES UR QL: NOT DETECTED
ANION GAP SERPL CALCULATED.3IONS-SCNC: 10 MMOL/L (ref 7–15)
BARBITURATES UR QL SCN: NOT DETECTED
BENZODIAZ UR QL SCN: NOT DETECTED
BUN SERPL-MCNC: 17.6 MG/DL (ref 8–23)
BUPRENORPHINE UR QL: NOT DETECTED
CALCIUM SERPL-MCNC: 9.8 MG/DL (ref 8.8–10.2)
CANNABINOIDS UR QL: NOT DETECTED
CHLORIDE SERPL-SCNC: 98 MMOL/L (ref 98–107)
COCAINE UR QL SCN: NOT DETECTED
CREAT SERPL-MCNC: 0.94 MG/DL (ref 0.51–0.95)
CREAT UR-MCNC: 81.1 MG/DL
D-METHAMPHET UR QL: NOT DETECTED
DEPRECATED HCO3 PLAS-SCNC: 26 MMOL/L (ref 22–29)
GFR SERPL CREATININE-BSD FRML MDRD: 64 ML/MIN/1.73M2
GLUCOSE SERPL-MCNC: 99 MG/DL (ref 70–99)
METHADONE UR QL SCN: NOT DETECTED
MICROALBUMIN UR-MCNC: <12 MG/L
MICROALBUMIN/CREAT UR: NORMAL MG/G{CREAT}
OPIATES UR QL SCN: NOT DETECTED
OXYCODONE UR QL SCN: NOT DETECTED
PCP UR QL SCN: NOT DETECTED
POTASSIUM SERPL-SCNC: 4.1 MMOL/L (ref 3.4–5.3)
PROPOXYPH UR QL: NOT DETECTED
SODIUM SERPL-SCNC: 134 MMOL/L (ref 136–145)
TRICYCLICS UR QL SCN: NOT DETECTED

## 2022-07-11 PROCEDURE — 80306 DRUG TEST PRSMV INSTRMNT: CPT | Performed by: FAMILY MEDICINE

## 2022-07-11 PROCEDURE — 99214 OFFICE O/P EST MOD 30 MIN: CPT | Mod: 25 | Performed by: FAMILY MEDICINE

## 2022-07-11 PROCEDURE — G0439 PPPS, SUBSEQ VISIT: HCPCS | Performed by: FAMILY MEDICINE

## 2022-07-11 PROCEDURE — 82043 UR ALBUMIN QUANTITATIVE: CPT | Performed by: FAMILY MEDICINE

## 2022-07-11 PROCEDURE — 36415 COLL VENOUS BLD VENIPUNCTURE: CPT | Performed by: FAMILY MEDICINE

## 2022-07-11 PROCEDURE — 80048 BASIC METABOLIC PNL TOTAL CA: CPT | Performed by: FAMILY MEDICINE

## 2022-07-11 RX ORDER — HYDROXYZINE HYDROCHLORIDE 25 MG/1
25 TABLET, FILM COATED ORAL PRN
COMMUNITY
Start: 2020-10-06 | End: 2022-07-11

## 2022-07-11 RX ORDER — AMOXICILLIN 500 MG/1
500 TABLET, FILM COATED ORAL
COMMUNITY
End: 2022-07-11

## 2022-07-11 RX ORDER — HYDROXYZINE HYDROCHLORIDE 25 MG/1
25 TABLET, FILM COATED ORAL PRN
Qty: 30 TABLET | Refills: 1 | Status: SHIPPED | OUTPATIENT
Start: 2022-07-11 | End: 2022-09-08

## 2022-07-11 RX ORDER — TRAMADOL HYDROCHLORIDE 50 MG/1
50 TABLET ORAL 2 TIMES DAILY PRN
Qty: 30 TABLET | Refills: 1 | Status: SHIPPED | OUTPATIENT
Start: 2022-07-11 | End: 2023-07-19

## 2022-07-11 RX ORDER — AMOXICILLIN 500 MG/1
500 CAPSULE ORAL 3 TIMES DAILY
Qty: 30 CAPSULE | Refills: 0 | Status: SHIPPED | OUTPATIENT
Start: 2022-07-11 | End: 2023-02-15

## 2022-07-11 RX ORDER — TRAZODONE HYDROCHLORIDE 50 MG/1
50 TABLET, FILM COATED ORAL
Status: CANCELLED | OUTPATIENT
Start: 2022-07-11

## 2022-07-11 RX ORDER — AMOXICILLIN 500 MG/1
2000 TABLET, FILM COATED ORAL
Qty: 4 TABLET | Refills: 5 | Status: SHIPPED | OUTPATIENT
Start: 2022-07-11 | End: 2023-02-15

## 2022-07-11 RX ORDER — MELOXICAM 7.5 MG/1
7.5 TABLET ORAL DAILY PRN
COMMUNITY
Start: 2022-05-05 | End: 2022-07-11

## 2022-07-11 RX ORDER — MELOXICAM 7.5 MG/1
7.5 TABLET ORAL DAILY PRN
Qty: 90 TABLET | Refills: 1 | Status: SHIPPED | OUTPATIENT
Start: 2022-07-11 | End: 2023-02-15

## 2022-07-11 RX ORDER — TRAZODONE HYDROCHLORIDE 50 MG/1
50 TABLET, FILM COATED ORAL
COMMUNITY
Start: 2020-06-17 | End: 2023-07-19

## 2022-07-11 RX ORDER — LIDOCAINE 50 MG/G
1 PATCH TOPICAL EVERY 24 HOURS
Qty: 30 PATCH | Refills: 5 | Status: SHIPPED | OUTPATIENT
Start: 2022-07-11 | End: 2023-02-15

## 2022-07-11 ASSESSMENT — ENCOUNTER SYMPTOMS
FEVER: 0
FREQUENCY: 1
CHILLS: 0
ARTHRALGIAS: 1
WEAKNESS: 1
CONSTIPATION: 0
NERVOUS/ANXIOUS: 0
HEMATURIA: 0
DIZZINESS: 1
BREAST MASS: 0
ABDOMINAL PAIN: 0
MYALGIAS: 1
HEADACHES: 1
COUGH: 1
DYSURIA: 0
JOINT SWELLING: 1
SORE THROAT: 0
HEMATOCHEZIA: 0
SHORTNESS OF BREATH: 0
NAUSEA: 1
HEARTBURN: 1
EYE PAIN: 0
DIARRHEA: 0
PARESTHESIAS: 0
PALPITATIONS: 0

## 2022-07-11 ASSESSMENT — ACTIVITIES OF DAILY LIVING (ADL): CURRENT_FUNCTION: NO ASSISTANCE NEEDED

## 2022-07-11 NOTE — PROGRESS NOTES
"SUBJECTIVE:   Deann Shah is a 72 year old female who presents for Preventive Visit.      Patient has been advised of split billing requirements and indicates understanding: Yes  Are you in the first 12 months of your Medicare coverage?  No    Patient is here for annual wellness visit  Also has several concerns to discuss:  1.  Patient with ongoing chronic pain primarily in her right shoulder.  Has known tear that cannot be treated with surgery due to that arm being her only functional arm.  Therefore she is treating with pain medication primarily.  2.  Patient has noted recurrence of difficulty swallowing.  Has had a couple episodes where food gets stuck part way down and she has to vomit in order back up.  Has previously had esophageal dilation in 2011.  Need referral to gastroenterology  3.  Patient has been noticing increasing right groin pain.  Occasionally has pain in her leg that makes it difficult to stand.  4.  Patient with upper left gum pain that started after using  more frequently    Healthy Habits:     In general, how would you rate your overall health?  Good    Frequency of exercise:  None    Do you usually eat at least 4 servings of fruit and vegetables a day, include whole grains    & fiber and avoid regularly eating high fat or \"junk\" foods?  Yes    Taking medications regularly:  Yes    Barriers to taking medications:  None    Medication side effects:  Other    Ability to successfully perform activities of daily living:  No assistance needed    Home Safety:  Lack of grab bars in the bathroom    Hearing Impairment:  No hearing concerns    In the past 6 months, have you been bothered by leaking of urine?  No    In general, how would you rate your overall mental or emotional health?  Fair      PHQ-2 Total Score: 0    Additional concerns today:  Yes    Do you feel safe in your environment? Yes    Have you ever done Advance Care Planning? (For example, a Health Directive, POLST, or a " discussion with a medical provider or your loved ones about your wishes): No, advance care planning information given to patient to review.  Patient declined advance care planning discussion at this time.       Fall risk  Fallen 2 or more times in the past year?: No  Any fall with injury in the past year?: No    Cognitive Screening   1) Repeat 3 items (Leader, Season, Table)    2) Clock draw: NORMAL  3) 3 item recall: Recalls 3 objects  Results: 3 items recalled: COGNITIVE IMPAIRMENT LESS LIKELY    Mini-CogTM Copyright S Manuel. Licensed by the author for use in [facility]; reprinted with permission (ibis@[facility]). All rights reserved.      Do you have sleep apnea, excessive snoring or daytime drowsiness?: no    Reviewed and updated as needed this visit by clinical staff   Tobacco  Allergies  Meds  Problems  Med Hx  Surg Hx  Fam Hx            Reviewed and updated as needed this visit by Provider   Tobacco  Allergies  Meds  Problems  Med Hx  Surg Hx  Fam Hx           Social History     Tobacco Use     Smoking status: Never Smoker     Smokeless tobacco: Never Used   Substance Use Topics     Alcohol use: Yes     Comment: occasional drink on weekend     If you drink alcohol do you typically have >3 drinks per day or >7 drinks per week? No    Alcohol Use 7/11/2022   Prescreen: >3 drinks/day or >7 drinks/week? No   Prescreen: >3 drinks/day or >7 drinks/week? -           Current providers sharing in care for this patient include:   Patient Care Team:  Blossom Ramirez MD as PCP - General (Family Medicine)  Nato Obrien OD as PCP - Ophthalmology (Ophthalmology)  Blossom Ramirez MD as Assigned PCP    The following health maintenance items are reviewed in Epic and correct as of today:  Health Maintenance Due   Topic Date Due     MICROALBUMIN  Never done     URINE DRUG SCREEN  Never done     HEPATITIS C SCREENING  Never done     LIPID  07/27/2019     DTAP/TDAP/TD IMMUNIZATION (2 - Td or  "Tdap) 10/19/2021     COVID-19 Vaccine (4 - Booster for Moderna series) 04/17/2022               Review of Systems   Constitutional: Negative for chills and fever.   HENT: Positive for congestion. Negative for ear pain, hearing loss and sore throat.    Eyes: Positive for visual disturbance. Negative for pain.   Respiratory: Positive for cough. Negative for shortness of breath.    Cardiovascular: Positive for peripheral edema. Negative for chest pain and palpitations.   Gastrointestinal: Positive for heartburn and nausea. Negative for abdominal pain, constipation, diarrhea and hematochezia.   Breasts:  Negative for tenderness, breast mass and discharge.   Genitourinary: Positive for frequency and urgency. Negative for dysuria, genital sores, hematuria, pelvic pain, vaginal bleeding and vaginal discharge.   Musculoskeletal: Positive for arthralgias, joint swelling and myalgias.   Skin: Negative for rash.   Neurological: Positive for dizziness, weakness and headaches. Negative for paresthesias.   Psychiatric/Behavioral: Negative for mood changes. The patient is not nervous/anxious.          OBJECTIVE:   There were no vitals taken for this visit. Estimated body mass index is 34.31 kg/m  as calculated from the following:    Height as of 7/1/21: 1.549 m (5' 1\").    Weight as of 2/9/22: 82.4 kg (181 lb 9.6 oz).  Physical Exam  GENERAL APPEARANCE: healthy, alert and no distress  EYES: Eyes grossly normal to inspection, PERRL and conjunctivae and sclerae normal  HENT: ear canals and TM's normal, nose and mouth without ulcers or lesions, oropharynx clear and oral mucous membranes moist, left upper gum is swollen and mildly red, no discrete abscess  NECK: no adenopathy, no asymmetry, masses, or scars and thyroid normal to palpation  RESP: lungs clear to auscultation - no rales, rhonchi or wheezes  CV: regular rate and rhythm, normal S1 S2, no S3 or S4, no murmur, click or rub, no peripheral edema and peripheral pulses " strong  ABDOMEN: soft, nontender, no hepatosplenomegaly, no masses and bowel sounds normal  MS: no musculoskeletal defects are noted except left arm absence from forearm, mild limp with ambulating   SKIN: no suspicious lesions or rashes  NEURO: Normal strength and tone, sensory exam grossly normal, mentation intact and speech normal  PSYCH: mentation appears normal and affect normal/bright    Hip x-ray reviewed by me shows moderate narrowing of the hip joint consistent with arthritis    ASSESSMENT / PLAN:   (Z00.00) Encounter for Medicare annual wellness exam  (primary encounter diagnosis)  Comment: Preventive services reviewed  Plan: Discussed mammogram, colon cancer screening, vaccines, routine labs.  Patient will continue to follow-up annually for preventive services    (G89.29) Other chronic pain  Comment: Patient with chronic pain not controlled with over-the-counter medication  Plan: meloxicam (MOBIC) 7.5 MG tablet, hydrOXYzine         (ATARAX) 25 MG tablet, traMADol (ULTRAM) 50 MG         tablet, lidocaine (LIDODERM) 5 % patch, Urine         Drugs of Abuse Screen, CANCELED: Urine Drugs of        Abuse Screen        Reviewed risks of use of tramadol.  Will continue to use as needed.  Follow-up in 6 months as needed.    (N18.31) Chronic kidney disease, stage 3a (H)  Comment: Patient with abnormal kidney function on last check  Plan: Albumin Random Urine Quantitative with Creat         Ratio, Basic metabolic panel, CANCELED: Basic         metabolic panel        Will check BMP and microalbumin today    (M25.511,  G89.29) Chronic right shoulder pain  Comment: Patient with ongoing chronic right shoulder pain that is not always treated with tramadol  Plan: lidocaine (LIDODERM) 5 % patch        Discussed that Lidoderm patches may not be covered with patient would like to try something topical    (R13.19) Other dysphagia  Comment: Patient with episodes of difficulty with food getting stuck when swallowing  Plan: Adult  "GI  Referral - Consult Only        Refer to gastroenterology    (M25.551) Hip pain, right  Comment: Patient with right hip pain that appears to be related to degenerative change  Plan: XR Hip Right 2-3 Views        I reviewed x-ray and suspect that moderate arthritis is present.  Patient will monitor and consider orthopedic referral    (K04.7) Dental infection  Comment: Mild dental infection, will treat with amoxicillin.  Also refilled preventive amoxicillin that she uses prior to dental procedures  Plan: amoxicillin (AMOXIL) 500 MG capsule,         amoxicillin (AMOXIL) 500 MG tablet              Patient has been advised of split billing requirements and indicates understanding: Yes    COUNSELING:  Reviewed preventive health counseling, as reflected in patient instructions       Healthy diet/nutrition       Dental care       Fall risk prevention       Advanced Planning     Estimated body mass index is 34.31 kg/m  as calculated from the following:    Height as of 7/1/21: 1.549 m (5' 1\").    Weight as of 2/9/22: 82.4 kg (181 lb 9.6 oz).    Weight management plan: Discussed healthy diet and exercise guidelines    She reports that she has never smoked. She has never used smokeless tobacco.      Appropriate preventive services were discussed with this patient, including applicable screening as appropriate for cardiovascular disease, diabetes, osteopenia/osteoporosis, and glaucoma.  As appropriate for age/gender, discussed screening for colorectal cancer, prostate cancer, breast cancer, and cervical cancer. Checklist reviewing preventive services available has been given to the patient.    Reviewed patients plan of care and provided an AVS. The Basic Care Plan (routine screening as documented in Health Maintenance) for Deann meets the Care Plan requirement. This Care Plan has been established and reviewed with the Patient.        Counseling Resources:  ATP IV Guidelines  Pooled Cohorts Equation " Calculator  Breast Cancer Risk Calculator  Breast Cancer: Medication to Reduce Risk  FRAX Risk Assessment  ICSI Preventive Guidelines  Dietary Guidelines for Americans, 2010  USDA's MyPlate  ASA Prophylaxis  Lung CA Screening    Blossom Ramirez MD  [facility]    Identified Health Risks:      She is at risk for lack of exercise and has been provided with information to increase physical activity for the benefit of her well-being.  The patient was provided with suggestions to help her develop a healthy emotional lifestyle.

## 2022-07-11 NOTE — PATIENT INSTRUCTIONS
Patient Education   Personalized Prevention Plan  You are due for the preventive services outlined below.  Your care team is available to assist you in scheduling these services.  If you have already completed any of these items, please share that information with your care team to update in your medical record.  Health Maintenance Due   Topic Date Due     Kidney Microalbumin Urine Test  Never done     URINE DRUG SCREEN  Never done     Hepatitis C Screening  Never done     Cholesterol Lab  07/27/2019     Diptheria Tetanus Pertussis (DTAP/TDAP/TD) Vaccine (2 - Td or Tdap) 10/19/2021     COVID-19 Vaccine (4 - Booster for Moderna series) 04/17/2022       Exercise for a Healthier Heart  You may wonder how you can improve the health of your heart. If you re thinking about exercise, you re on the right track. You don t need to become an athlete. But you do need a certain amount of brisk exercise to help strengthen your heart. If you have been diagnosed with a heart condition, your healthcare provider may advise exercise to help stabilize your condition. To help make exercise a habit, choose safe, fun activities.      Exercise with a friend. When activity is fun, you're more likely to stick with it.   Before you start  Check with your healthcare provider before starting an exercise program. This is especially important if you have not been active for a while. It's also important if you have a long-term (chronic) health problem such as heart disease, diabetes, or obesity. Or if you are at high risk for having these problems.   Why exercise?  Exercising regularly offers many healthy rewards. It can help you do all of the following:     Improve your blood cholesterol level to help prevent further heart trouble    Lower your blood pressure to help prevent a stroke or heart attack    Control diabetes, or reduce your risk of getting this disease    Improve your heart and lung function    Reach and stay at a healthy  weight    Make your muscles stronger so you can stay active    Prevent falls and fractures by slowing the loss of bone mass (osteoporosis)    Manage stress better    Reduce your blood pressure    Improve your sense of self and your body image  Exercise tips      Ease into your routine. Set small goals. Then build on them. If you are not sure what your activity level should be, talk with your healthcare provider first before starting an exercise routine.    Exercise on most days. Aim for a total of 150 minutes (2 hours and 30 minutes) or more of moderate-intensity aerobic activity each week. Or 75 minutes (1 hour and 15 minutes) or more of vigorous-intensity aerobic activity each week. Or try for a combination of both. Moderate activity means that you breathe heavier and your heart rate increases but you can still talk. Think about doing 40 minutes of moderate exercise, 3 to 4 times a week. For best results, activity should last for about 40 minutes to lower blood pressure and cholesterol. It's OK to work up to the 40-minute period over time. Examples of moderate-intensity activity are walking 1 mile in 15 minutes. Or doing 30 to 45 minutes of yard work.    Step up your daily activity level.  Along with your exercise program, try being more active the whole day. Walk instead of drive. Or park further away so that you take more steps each day. Do more household tasks or yard work. You may not be able to meet the advised mount of physical activity. But doing some moderate- or vigorous-intensity aerobic activity can help reduce your risk for heart disease. Your healthcare provider can help you figure out what is best for you.    Choose 1 or more activities you enjoy.  Walking is one of the easiest things you can do. You can also try swimming, riding a bike, dancing, or taking an exercise class.    When to call your healthcare provider  Call your healthcare provider if you have any of these:     Chest pain or feel dizzy  or lightheaded    Burning, tightness, pressure, or heaviness in your chest, neck, shoulders, back, or arms    Abnormal shortness of breath    More joint or muscle pain    A very fast or irregular heartbeat (palpitations)  [employer] last reviewed this educational content on 7/1/2019 2000-2021 [employer]. All rights reserved. This information is not intended as a substitute for professional medical care. Always follow your healthcare professional's instructions.        Your Health Risk Assessment indicates you feel you are not in good emotional health.    Recreation   Recreation is not limited to sports and team events. It includes any activity that provides relaxation, interest, enjoyment, and exercise. Recreation provides an outlet for physical, mental, and social energy. It can give a sense of worth and achievement. It can help you stay healthy.    Mental Exercise and Social Involvement  Mental and emotional health is as important as physical health. Keep in touch with friends and family. Stay as active as possible. Continue to learn and challenge yourself.   Things you can do to stay mentally active are:    Learn something new, like a foreign language or musical instrument.     Play SCRABBLE or do crossword puzzles. If you cannot find people to play these games with you at home, you can play them with others on your computer through the Internet.     Join a games club--anything from card games to chess or checkers or lawn bowling.     Start a new hobby.     Go back to school.     Volunteer.     Read.   Keep up with world events.

## 2022-07-11 NOTE — LETTER
[facility] [place]  07/11/22  Patient: Deann Shah  YOB: 1950  Medical Record Number: [id number]                                                                                  Non-Opioid Controlled Substance Agreement                (traMADol (ULTRAM) 50 MG tablet)    This is an agreement between you and your provider regarding safe and appropriate use of controlled substances prescribed by your care team. Controlled substances are?medicines that can cause physical and mental dependence (abuse).     There are strict laws about having and using these medicines. We here at [facility] are  committed to working with you in your efforts to get better. To support you in this work, we'll help you schedule regular office appointments for medicine refills. If we must cancel or change your appointment for any reason, we'll make sure you have enough medicine to last until your next appointment.     As a Provider, I will:     Listen carefully to your concerns while treating you with respect.     Recommend a treatment plan that I believe is in your best interest and may involve therapies other than medicine.      Talk with you often about the possible benefits and the risk of harm of any medicine that we prescribe for you.    Assess the safety of this medicine and check how well it works.      Provide a plan on how to taper (discontinue or go off) using this medicine if the decision is made to stop its use.      ::  As a Patient, I understand controlled substances:       Are prescribed by my care provider to help me function or work and manage my condition(s).?    Are strong medicines and can cause serious side effects.       Need to be taken exactly as prescribed.?Combining controlled substances with certain medicines or chemicals (such as illegal drugs, alcohol, sedatives, sleeping pills, and benzodiazepines) can be dangerous or even fatal.? If I stop taking my medicines suddenly, I  may have severe withdrawal symptoms.     The risks, benefits, and side effects of these medicine(s) were explained to me. I agree that:    1. I will take part in other treatments as advised by my care team. This may be psychiatry or counseling, physical therapy, behavioral therapy, group treatment or a referral to specialist.    2. I will keep all my appointments and understand this is part of the monitoring of controlled substances.?My care team may require an office visit for EVERY controlled substance refill. If I miss appointments or don t follow instructions, my care team may stop my medicine    3. I will take my medicines as prescribed. I will not change the dose or schedule unless my care team tells me to. There will be no refills if I run out early.      4. I may be asked to come to the clinic and complete a urine drug test or complete a pill count. If I don t give a urine sample or participate in a pill count, the care team may stop my medicine.    5. I will only receive controlled substance prescriptions from this clinic. If I am treated by another provider, I will tell them that I am taking controlled substances and that I have a treatment agreement with this provider. I will inform my [facility] care team within one business day if I am given a prescription for any controlled substance by another healthcare provider. My [facility] care team can contact other providers and pharmacists about my use of any medicines.    6. It is up to me to make sure that I don't run out of my medicines on weekends or holidays.?If my care team is willing to refill my prescription without a visit, I must request refills only during office hours. Refills may take up to 3 business days to process. I will use one pharmacy to fill all my controlled substance prescriptions. I will notify the clinic about any changes to my insurance or medicine availability.    7. I am responsible for my prescriptions. If the  medicine/prescription is lost, stolen or destroyed, it will not be replaced.?I also agree not to share controlled substance medicines with anyone.     8. I am aware I should not use any illegal or recreational drugs. I agree not to drink alcohol unless my care team says I can.     9. If I enroll in the [place] Medical Cannabis program, I will tell my care team before my next refill.    10. I will tell my care team right away if I become pregnant, have a new medical problem treated outside of my regular clinic, or have a change in my medicines.     11. I understand that this medicine can affect my thinking, judgment and reaction time.? Alcohol and drugs affect the brain and body, which can affect the safety of my driving. Being under the influence of alcohol or drugs can affect my decision-making, behaviors, personal safety and the safety of others. Driving while impaired (DWI) can occur if a person is driving, operating or in physical control of a car, motorcycle, boat, snowmobile, ATV, motorbike, off-road vehicle or any other motor vehicle ([place] Statute 169A.20). I understand the risk if I choose to drive or operate any vehicle or machinery.    I understand that if I do not follow any of the conditions above, my prescriptions or treatment may be stopped or changed.   I agree that my provider, clinic care team and pharmacy may work with any city, state or federal law enforcement agency that investigates the misuse, sale or other diversion of my controlled medicine. I will allow my provider to discuss my care with, or share a copy of, this agreement with any other treating provider, pharmacy or emergency room where I receive care.     I have read this agreement and have asked questions about anything I did not understand.    ________________________________________________________  Patient Signature - Deann SHANNON Shah     ___________________                   Date      ________________________________________________________  Provider Signature - Kerith W. Lijewski, MD       ___________________                   Date     ________________________________________________________  Witness Signature (required if provider not present while patient signing)          ___________________                   Date

## 2022-07-12 ENCOUNTER — TELEPHONE (OUTPATIENT)
Dept: FAMILY MEDICINE | Facility: CLINIC | Age: 72
End: 2022-07-12

## 2022-07-12 NOTE — TELEPHONE ENCOUNTER
PRIOR AUTHORIZATION DENIED    Medication: lidocaine (LIDODERM) 5 % patch - EPA DENIED    Denial Date:  07/11/2022    Denial Rational: NOT COVERED FOR DX    Appeal Information:

## 2022-07-26 ENCOUNTER — MYC MEDICAL ADVICE (OUTPATIENT)
Dept: FAMILY MEDICINE | Facility: CLINIC | Age: 72
End: 2022-07-26

## 2022-07-26 NOTE — TELEPHONE ENCOUNTER
Consult with Dr. Lee on 7/20/22 for gerd and esophageal dysphagia. Pt is scheduled for esophageal motility study tomorrow. Please see my chart message for advice as she is no longer having symptoms.

## 2022-07-28 ENCOUNTER — MYC MEDICAL ADVICE (OUTPATIENT)
Dept: FAMILY MEDICINE | Facility: CLINIC | Age: 72
End: 2022-07-28

## 2022-07-28 DIAGNOSIS — M89.9 LESION OF PELVIC BONE: Primary | ICD-10-CM

## 2022-07-28 NOTE — TELEPHONE ENCOUNTER
I called patient. Reviewed her test from GI. She is feeling well so will cancel further follow up.  Discussed pelvis XR. Will proceed with further imaging  Called radiology. Recommended bone scan. Order placed to fax to [facility].

## 2022-08-02 ENCOUNTER — MYC MEDICAL ADVICE (OUTPATIENT)
Dept: FAMILY MEDICINE | Facility: CLINIC | Age: 72
End: 2022-08-02

## 2022-08-20 DIAGNOSIS — F41.1 GAD (GENERALIZED ANXIETY DISORDER): ICD-10-CM

## 2022-08-23 RX ORDER — ESCITALOPRAM OXALATE 10 MG/1
TABLET ORAL
Qty: 90 TABLET | Refills: 0 | Status: SHIPPED | OUTPATIENT
Start: 2022-08-23 | End: 2022-11-21

## 2022-08-23 NOTE — TELEPHONE ENCOUNTER
"Pending Prescriptions:                       Disp   Refills    escitalopram (LEXAPRO) 10 MG tablet [Phar*90 tab*0            Sig: TAKE 1 TABLET(10 MG) BY MOUTH DAILY    Last Written Prescription Date:  6/9/22  Last Fill Quantity: 90,  # refills: 0  Last office visit: 7/11/2022   Future Office Visit:          Requested Prescriptions   Pending Prescriptions Disp Refills     escitalopram (LEXAPRO) 10 MG tablet [Pharmacy Med Name: ESCITALOPRAM 10MG TABLETS] 90 tablet 0     Sig: TAKE 1 TABLET(10 MG) BY MOUTH DAILY       SSRIs Protocol Passed - 8/20/2022  5:14 AM        Passed - Recent (12 mo) or future (30 days) visit within the authorizing provider's specialty     Patient has had an office visit with the authorizing provider or a provider within the authorizing providers department within the previous 12 mos or has a future within next 30 days. See \"Patient Info\" tab in inbasket, or \"Choose Columns\" in Meds & Orders section of the refill encounter.              Passed - Medication is active on med list        Passed - Patient is age 18 or older        Passed - No active pregnancy on record        Passed - No positive pregnancy test in last 12 months             NKECHI ZELAYA RN 08/23/22 1:44 PM    "

## 2022-08-24 ENCOUNTER — MYC REFILL (OUTPATIENT)
Dept: FAMILY MEDICINE | Facility: CLINIC | Age: 72
End: 2022-08-24

## 2022-08-24 DIAGNOSIS — F41.1 GAD (GENERALIZED ANXIETY DISORDER): ICD-10-CM

## 2022-08-25 RX ORDER — ESCITALOPRAM OXALATE 10 MG/1
10 TABLET ORAL DAILY
Qty: 90 TABLET | Refills: 0 | OUTPATIENT
Start: 2022-08-25

## 2022-08-25 NOTE — TELEPHONE ENCOUNTER
"Last Written Prescription Date:  8/23/2022--message sent to pharmacy   Last Fill Quantity: 90,  # refills: 0  Last office visit provider: 7/11/2022 with PCP Dr NATAIL Ramirez     Requested Prescriptions   Pending Prescriptions Disp Refills     escitalopram (LEXAPRO) 10 MG tablet 90 tablet 0     Sig: Take 1 tablet (10 mg) by mouth daily       SSRIs Protocol Passed - 8/24/2022 10:31 AM        Passed - Recent (12 mo) or future (30 days) visit within the authorizing provider's specialty     Patient has had an office visit with the authorizing provider or a provider within the authorizing providers department within the previous 12 mos or has a future within next 30 days. See \"Patient Info\" tab in inbasket, or \"Choose Columns\" in Meds & Orders section of the refill encounter.              Passed - Medication is active on med list        Passed - Patient is age 18 or older        Passed - No active pregnancy on record        Passed - No positive pregnancy test in last 12 months             Nyla Boo RN 08/25/22 4:38 PM  "

## 2022-08-26 ENCOUNTER — MYC MEDICAL ADVICE (OUTPATIENT)
Dept: FAMILY MEDICINE | Facility: CLINIC | Age: 72
End: 2022-08-26

## 2022-09-08 DIAGNOSIS — G89.29 OTHER CHRONIC PAIN: ICD-10-CM

## 2022-09-08 RX ORDER — HYDROXYZINE HYDROCHLORIDE 25 MG/1
25 TABLET, FILM COATED ORAL PRN
Qty: 30 TABLET | Refills: 1 | Status: SHIPPED | OUTPATIENT
Start: 2022-09-08 | End: 2023-02-15

## 2022-09-08 NOTE — TELEPHONE ENCOUNTER
"Last Written Prescription Date:  7/11/22  Last Fill Quantity: 30,  # refills: 1   Last office visit: 7/11/2022   Future Office Visit:        Requested Prescriptions   Pending Prescriptions Disp Refills     hydrOXYzine (ATARAX) 25 MG tablet 30 tablet 1     Sig: Take 1 tablet (25 mg) by mouth as needed for itching or anxiety       Antihistamines Protocol Passed - 9/8/2022 10:08 AM        Passed - Recent (12 mo) or future (30 days) visit within the authorizing provider's specialty     Patient has had an office visit with the authorizing provider or a provider within the authorizing providers department within the previous 12 mos or has a future within next 30 days. See \"Patient Info\" tab in inbasket, or \"Choose Columns\" in Meds & Orders section of the refill encounter.              Passed - Patient is age 3 or older     Apply age and/or weight-based dosing for peds patients age 3 and older.    Forward request to provider for patients under the age of 3.          Passed - Medication is active on med NKECHI Chapman RN 09/08/22 2:10 PM    "

## 2022-10-03 ENCOUNTER — HEALTH MAINTENANCE LETTER (OUTPATIENT)
Age: 72
End: 2022-10-03

## 2022-11-21 DIAGNOSIS — F41.1 GAD (GENERALIZED ANXIETY DISORDER): ICD-10-CM

## 2022-11-21 RX ORDER — ESCITALOPRAM OXALATE 10 MG/1
TABLET ORAL
Qty: 90 TABLET | Refills: 0 | Status: SHIPPED | OUTPATIENT
Start: 2022-11-21 | End: 2023-02-16

## 2022-11-21 NOTE — TELEPHONE ENCOUNTER
"    Last Written Prescription Date:  8/23/22  Last Fill Quantity: 90,  # refills: 0   Last office visit provider:  7/11/2022     Requested Prescriptions   Pending Prescriptions Disp Refills     escitalopram (LEXAPRO) 10 MG tablet [Pharmacy Med Name: ESCITALOPRAM 10MG TABLETS] 90 tablet 0     Sig: TAKE 1 TABLET(10 MG) BY MOUTH DAILY       SSRIs Protocol Passed - 11/21/2022  8:43 AM        Passed - Recent (12 mo) or future (30 days) visit within the authorizing provider's specialty     Patient has had an office visit with the authorizing provider or a provider within the authorizing providers department within the previous 12 mos or has a future within next 30 days. See \"Patient Info\" tab in inbasket, or \"Choose Columns\" in Meds & Orders section of the refill encounter.              Passed - Medication is active on med list        Passed - Patient is age 18 or older        Passed - No active pregnancy on record        Passed - No positive pregnancy test in last 12 months             NKECHI ZELAYA RN 11/21/22 12:22 PM    "

## 2023-02-12 ENCOUNTER — NURSE TRIAGE (OUTPATIENT)
Dept: NURSING | Facility: CLINIC | Age: 73
End: 2023-02-12
Payer: MEDICARE

## 2023-02-12 NOTE — TELEPHONE ENCOUNTER
Nurse Triage SBAR    Is this a 2nd Level Triage? YES, LICENSED PRACTITIONER REVIEW IS REQUIRED    Situation:   Pt calling about head injury and blister on left ankle, requesting for CT scan      Background:   Pt just got back from [place] yesterday, on Wednesday 2/8, pt had head injury, slipped on rug and fell back and hit her head, had 2 stitches placed. Head hit table between bed, felt nausea first couple of days  Was instructed to follow up with PCP on Wednesday 2/15 to have stitches removed  Had xray done  Currently taking antibiotics and tylenol     Assessment:   Headache level 7, eyes hurts to read anything,  Head Bump is going down,     New shoes caused left Ankle blister on Monday 2/6, now spreading to bottom of foot, increased from 1 inch to 3 inch now, Pt popped it and it keeps refilling-  Redness underneath blister    Protocol Recommended Disposition:   See PCP Within 24 Hours, Go to ED Now (Or PCP Triage)    Recommendation:   Go to ED, pt is wondering if symptoms can wait until Wednesday 2/15 when getting stitches out to be seen.    Paged to provider on call Max rTan, recommends monitor symptoms and follow up with PCP during clinic hours tomorrow. Go to ED if symptoms worsen.    Does the patient meet one of the following criteria for ADS visit consideration? 16+ years old, with an [facility] PCP     TIP  Providers, please consider if this condition is appropriate for management at one of our Acute and Diagnostic Services sites.     If patient is a good candidate, please use dotphrase <dot>triageresponse and select Refer to ADS to document.    Provider Recommendation Follow Up:   Reached patient/caregiver. Informed of provider's recommendations. Patient verbalized understanding and agrees with the plan. Advised to call clinic in the morning for appointment for further evaluation.          Brenda Forrester, RN, BSN  2/12/2023 at 2:35 PM  [facility]    Reason for Disposition    Dangerous injury  "(e.g., MVA, diving, trampoline, contact sports, fall > 10 feet or 3 meters) or severe blow from hard object (e.g., golf club or baseball bat)    [1] Looks infected (spreading redness, red streak, pus) AND [2] no fever    Additional Information    Negative: Sounds like a life-threatening emergency to the triager    Negative: [1] ACUTE NEURO SYMPTOM AND [2] present now  (DEFINITION: difficult to awaken OR confused thinking and talking OR slurred speech OR weakness of arms OR unsteady walking)    Negative: Knocked out (unconscious) > 1 minute    Negative: Seizure (convulsion) occurred  (Exception: prior history of seizures and now alert and without Acute Neuro Symptoms)    Negative: Penetrating head injury (e.g., knife, gun shot wound, metal object)    Negative: [1] Major bleeding (e.g., actively dripping or spurting) AND [2] can't be stopped    Negative: [1] Dangerous mechanism of injury (e.g., MVA, diving, trampoline, contact sports, fall > 10 feet or 3 meters) AND [2] NECK pain AND [3] began < 1 hour after injury    Negative: Sounds like a life-threatening emergency to the triager    Negative: Can't remember what happened (amnesia)    Negative: Vomiting once or more    Negative: [1] Loss of vision or double vision AND [2] present now    Negative: Watery or blood-tinged fluid dripping from the NOSE or EARS now  (Exception: tears from crying or nosebleed from nasal trauma)    Negative: [1] One or two \"black eyes\" (bruising, purple color of eyelids) AND [2] onset within 24 hours of head injury    Negative: Large swelling or bruise > 2 inches (5 cm)    Negative: Skin is split open or gaping  (or length > 1/2 inch or 12 mm)    Negative: [1] Bleeding AND [2] won't stop after 10 minutes of direct pressure (using correct technique)    Negative: Sounds like a serious injury to the triager    Negative: [1] ACUTE NEURO SYMPTOM AND [2] now fine  (DEFINITION: difficult to awaken OR confused thinking and talking OR slurred speech " OR weakness of arms OR unsteady walking)    Negative: [1] Knocked out (unconscious) < 1 minute AND [2] now fine    Negative: [1] SEVERE headache AND [2] not improved 2 hours after pain medicine/ice packs    Negative: Patient sounds very sick or weak to the triager    Negative: [1] Looks infected (spreading redness, red streak, pus) AND [2] fever    Negative: [1] Looks infected AND [2] large red area (> 2 in. or 5 cm)    Negative: [1] Foul-smelling odor from foot AND [2] new or increased    Negative: [1] Purple or black skin on foot or toe AND [2] new or increased    Negative: Looks like a boil or deep ulcer    Protocols used: HEAD INJURY-A-AH, BLISTER - FOOT AND HAND-A-AH

## 2023-02-13 ENCOUNTER — TELEPHONE (OUTPATIENT)
Dept: FAMILY MEDICINE | Facility: CLINIC | Age: 73
End: 2023-02-13
Payer: MEDICARE

## 2023-02-13 NOTE — TELEPHONE ENCOUNTER
S: Patient calling in returning a [employer] message.   B: Patient had a telephone nurse triage yesterday 2/12/22.       She recently returned home from [place] on 2/11/22.       On 2/8 she was evaluated by a provider, after a fall she sustained from slipping on rug.  Patient hit head on night stand, laceration on posterior back of head requiring 2 stitches.  She was told to follow up to have stitches removed 2/15/23.    A:  S/P injury patient continues to have head aches, sensitive to light, and TV.  Not any worse, patient states the same.    Patient is taking antibiotic Dicleophen 500 mg every 6 hours x5 days, last dose is today.  She is also taking Tylenol for her headache.    Patient states she also has a blister on her ankle from shoes during her travels.  Denies fever, or any red streaks coming from blister.    R: RN made a follow up appointment with her PCP on 2/15/23.  Patient would prefer to see her PCP vs coming in today or tomorrow to see a different provider for her symptoms.  Writer explained if her symptoms worsen or signs of infection begin to call clinic, as she would need to be re-evaluated and may need a appointment prior to the 2/15/23 appointment.       [facility]

## 2023-02-15 ENCOUNTER — OFFICE VISIT (OUTPATIENT)
Dept: FAMILY MEDICINE | Facility: CLINIC | Age: 73
End: 2023-02-15
Payer: MEDICARE

## 2023-02-15 VITALS
HEIGHT: 61 IN | WEIGHT: 183 LBS | SYSTOLIC BLOOD PRESSURE: 124 MMHG | OXYGEN SATURATION: 100 % | RESPIRATION RATE: 16 BRPM | DIASTOLIC BLOOD PRESSURE: 76 MMHG | BODY MASS INDEX: 34.55 KG/M2 | HEART RATE: 77 BPM

## 2023-02-15 DIAGNOSIS — S09.90XA INJURY OF HEAD, INITIAL ENCOUNTER: ICD-10-CM

## 2023-02-15 DIAGNOSIS — S90.522A BLISTER OF LEFT ANKLE, INITIAL ENCOUNTER: Primary | ICD-10-CM

## 2023-02-15 PROCEDURE — 99214 OFFICE O/P EST MOD 30 MIN: CPT | Performed by: FAMILY MEDICINE

## 2023-02-15 NOTE — PROGRESS NOTES
"  Assessment & Plan     Blister of left ankle, initial encounter  Patient with large blister on her left ankle that appears to be healing.  Dressed with Telfa and wrapped loosely with an Ace to keep light compression.  I have scheduled her with a follow-up with the nurse for reevaluation on Friday.  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  May need closer follow-up.    Injury of head, initial encounter  Patient sutures removed without difficulty.  Discussed head injury.  She has symptoms consistent with a concussion.  Have recommended avoiding bright lights and screen time.  Get extra sleep.  Avoid alcohol.  Push fluids.  Follow-up if not getting better in the next couple of weeks.               BMI:   Estimated body mass index is 34.58 kg/m  as calculated from the following:    Height as of this encounter: 1.549 m (5' 1\").    Weight as of this encounter: 83 kg (183 lb).           No follow-ups on file.   Follow-up Visit   Expected date: Jul 12, 2023      Follow Up Appointment Details:     Follow-up with whom?: Me    Follow-Up for what?: Medicare Wellness    Welcome or Annual?: Annual Wellness    How?: In Person    Is this an as-needed follow-up?: No                    Blossom Ramirez MD  [facility]    Brandee Zacarias is a 72 year old, presenting for the following health issues:  Suture Removal and Blister (Pt c/o blister on L ankle. She is taking dicleophen abx. )      History of Present Illness       Reason for visit:  Head injury and ankle blister  Symptom onset:  1-2 weeks ago  Symptoms include:  Stitches on head. Blister on foot  Symptom intensity:  Severe  Symptom progression:  Worsening  Had these symptoms before:  No  What makes it worse:  Bright light. Walking  What makes it better:  Laying down    She eats 2-3 servings of fruits and " "vegetables daily.She consumes 0 sweetened beverage(s) daily.She exercises with enough effort to increase her heart rate 9 or less minutes per day.  She exercises with enough effort to increase her heart rate 3 or less days per week.   She is taking medications regularly.     Patient was in [place] for vacation.  On the day after her arrival she noted a large blister just distal to the lateral malleolus on the left ankle.  It has extended down to her foot.  She thinks it may have started with new shoes but did not feel any pain.  It has recently started draining.  No surrounding erythema.  No purulent drainage.  Very tender to the touch.        Review of Systems         Objective    /76   Pulse 77   Resp 16   Ht 1.549 m (5' 1\")   Wt 83 kg (183 lb)   SpO2 100%   BMI 34.58 kg/m    Body mass index is 34.58 kg/m .  Physical Exam   Patient is alert cooperative in no acute distress  On examination of her scalp her sutures are intact and incision is well-healed.  These 2 sutures are removed without difficulty  On examination of her left ankle blister as noted in the photo.  No surrounding erythema.  Drainage is serosanguineous.  This is dressed with Telfa and wrapped with Ace bandage loosely     Media Information  Document Information    Other:  Photograph      02/15/2023 11:18 AM   Attached To:   Office Visit on 2/15/23 with Blossom Ramirez MD     Source Information    Blossom Ramirez MD  fl Fp/Im/Peds                       "

## 2023-02-16 DIAGNOSIS — F41.1 GAD (GENERALIZED ANXIETY DISORDER): ICD-10-CM

## 2023-02-16 RX ORDER — ESCITALOPRAM OXALATE 10 MG/1
TABLET ORAL
Qty: 90 TABLET | Refills: 1 | Status: SHIPPED | OUTPATIENT
Start: 2023-02-16 | End: 2023-07-19

## 2023-02-16 NOTE — TELEPHONE ENCOUNTER
"    Last office visit: 2/15/2023     Future Appointments 2/16/2023 - 8/15/2023      Date Visit Type Length Department Provider     2/17/2023  3:00 PM RN VISIT 20 min [facility] FP/IM/PEDS [facility] RN    Location Instructions:     [facility] one block north of [facility]. The clinic shares a building with the [facility]; use the clinic s parking lot and entrance on the building s south side.                    Requested Prescriptions   Pending Prescriptions Disp Refills     escitalopram (LEXAPRO) 10 MG tablet [Pharmacy Med Name: ESCITALOPRAM 10MG TABLETS] 90 tablet 0     Sig: TAKE 1 TABLET(10 MG) BY MOUTH DAILY       SSRIs Protocol Passed - 2/16/2023  5:14 AM        Passed - Recent (12 mo) or future (30 days) visit within the authorizing provider's specialty     Patient has had an office visit with the authorizing provider or a provider within the authorizing providers department within the previous 12 mos or has a future within next 30 days. See \"Patient Info\" tab in inbasket, or \"Choose Columns\" in Meds & Orders section of the refill encounter.              Passed - Medication is active on med list        Passed - Patient is age 18 or older        Passed - No active pregnancy on record        Passed - No positive pregnancy test in last 12 months                   "

## 2023-02-17 ENCOUNTER — ALLIED HEALTH/NURSE VISIT (OUTPATIENT)
Dept: FAMILY MEDICINE | Facility: CLINIC | Age: 73
End: 2023-02-17
Payer: MEDICARE

## 2023-02-17 DIAGNOSIS — Z48.00 CHANGE OF DRESSING: Primary | ICD-10-CM

## 2023-02-17 PROCEDURE — 99207 PR NO CHARGE NURSE ONLY: CPT

## 2023-02-17 NOTE — PROGRESS NOTES
Dressing removed, clean and dry.    Wound healing well, no drainage and no redness.    New dressing placed and wrapped.    Patient will monitor and return to  if any changes and call on Monday if any changes in wound.    RN checked head wound and noted scabbing and no drainage or redness.

## 2023-02-27 ENCOUNTER — MYC MEDICAL ADVICE (OUTPATIENT)
Dept: FAMILY MEDICINE | Facility: CLINIC | Age: 73
End: 2023-02-27
Payer: MEDICARE

## 2023-02-27 NOTE — TELEPHONE ENCOUNTER
See Ailynhart from patient needing PCP review.  Patient notified PCP is out on vacation and doesn't return until 3/6/23.  Patient would prefer to wait for PCP to review, or sending to covering provider to review.     Orthopedic referral request  LOV 2/15/23    Paulina Dodd RN  [facility]

## 2023-03-10 ENCOUNTER — MEDICAL CORRESPONDENCE (OUTPATIENT)
Dept: HEALTH INFORMATION MANAGEMENT | Facility: CLINIC | Age: 73
End: 2023-03-10

## 2023-03-13 ENCOUNTER — ANCILLARY PROCEDURE (OUTPATIENT)
Dept: GENERAL RADIOLOGY | Facility: CLINIC | Age: 73
End: 2023-03-13
Attending: STUDENT IN AN ORGANIZED HEALTH CARE EDUCATION/TRAINING PROGRAM
Payer: MEDICARE

## 2023-03-13 DIAGNOSIS — M25.561 RIGHT KNEE PAIN: ICD-10-CM

## 2023-03-20 ENCOUNTER — MYC MEDICAL ADVICE (OUTPATIENT)
Dept: FAMILY MEDICINE | Facility: CLINIC | Age: 73
End: 2023-03-20
Payer: MEDICARE

## 2023-03-20 PROBLEM — N18.31 CHRONIC KIDNEY DISEASE, STAGE 3A (H): Status: RESOLVED | Noted: 2022-07-11 | Resolved: 2023-03-20

## 2023-03-21 DIAGNOSIS — M19.90 ARTHRITIS: Primary | ICD-10-CM

## 2023-03-21 RX ORDER — CELECOXIB 200 MG/1
200 CAPSULE ORAL DAILY
Qty: 30 CAPSULE | Refills: 5 | Status: SHIPPED | OUTPATIENT
Start: 2023-03-21 | End: 2023-07-19 | Stop reason: ALTCHOICE

## 2023-05-02 ENCOUNTER — OFFICE VISIT (OUTPATIENT)
Dept: FAMILY MEDICINE | Facility: CLINIC | Age: 73
End: 2023-05-02
Payer: MEDICARE

## 2023-05-02 VITALS
HEIGHT: 61 IN | HEART RATE: 72 BPM | TEMPERATURE: 98 F | BODY MASS INDEX: 34.58 KG/M2 | RESPIRATION RATE: 20 BRPM | OXYGEN SATURATION: 98 % | SYSTOLIC BLOOD PRESSURE: 136 MMHG | DIASTOLIC BLOOD PRESSURE: 84 MMHG

## 2023-05-02 DIAGNOSIS — L03.114 CELLULITIS OF LEFT ELBOW: Primary | ICD-10-CM

## 2023-05-02 PROCEDURE — 87070 CULTURE OTHR SPECIMN AEROBIC: CPT | Performed by: PHYSICIAN ASSISTANT

## 2023-05-02 PROCEDURE — 99213 OFFICE O/P EST LOW 20 MIN: CPT | Performed by: PHYSICIAN ASSISTANT

## 2023-05-02 RX ORDER — CEPHALEXIN 500 MG/1
500 CAPSULE ORAL 2 TIMES DAILY
Qty: 20 CAPSULE | Refills: 0 | Status: SHIPPED | OUTPATIENT
Start: 2023-05-02 | End: 2023-05-12

## 2023-05-02 RX ORDER — TRIAMCINOLONE ACETONIDE 1 MG/G
CREAM TOPICAL
COMMUNITY
Start: 2022-11-23 | End: 2023-05-02

## 2023-05-02 ASSESSMENT — ENCOUNTER SYMPTOMS
WOUND: 1
RESPIRATORY NEGATIVE: 1
CONSTITUTIONAL NEGATIVE: 1

## 2023-05-02 NOTE — PROGRESS NOTES
"  1. Cellulitis of left elbow  Will get wound culture and start on cephalexin for 10 days  Continue with topical antibiotic and bandage  Follow up if not improving    - Wound Aerobic Bacterial Culture Routine  - cephALEXin (KEFLEX) 500 MG capsule; Take 1 capsule (500 mg) by mouth 2 times daily for 10 days  Dispense: 20 capsule; Refill: 0    Subjective   Deann is a 72 year old, presenting for the following health issues:  Wound Check (Pt c/o draining wound/sore on left elbow x 2-3 weeks)         View : No data to display.              Wound Check    History of Present Illness       Reason for visit:  Wound on left elbow    She eats 2-3 servings of fruits and vegetables daily.She consumes 0 sweetened beverage(s) daily.She exercises with enough effort to increase her heart rate 9 or less minutes per day.  She exercises with enough effort to increase her heart rate 3 or less days per week.   She is taking medications regularly.             3 week hx of wound/sore on left elbow, it has been draining  She has been using triple antibiotic on the wound      Review of Systems   Constitutional: Negative.    HENT: Negative.    Respiratory: Negative.    Skin: Positive for wound (left elbow).            Objective    BP (!) 146/84 (BP Location: Right arm, Patient Position: Sitting, Cuff Size: Adult Large)   Pulse 72   Temp 98  F (36.7  C) (Tympanic)   Resp 20   Ht 1.549 m (5' 1\")   SpO2 98%   BMI 34.58 kg/m    Body mass index is 34.58 kg/m .  Physical Exam  Vitals reviewed.   Constitutional:       Appearance: Normal appearance.   Cardiovascular:      Rate and Rhythm: Normal rate and regular rhythm.      Pulses: Normal pulses.      Heart sounds: Normal heart sounds.   Pulmonary:      Effort: Pulmonary effort is normal.      Breath sounds: Normal breath sounds.   Skin:     Comments: On left elbow there is a 2 cm ulcerated lesion with raised edges, weepy but without purulent drainage, some surrounding redness   Neurological: "      Mental Status: She is alert.

## 2023-05-04 LAB — BACTERIA WND CULT: NORMAL

## 2023-05-15 DIAGNOSIS — K21.9 CHRONIC GERD: ICD-10-CM

## 2023-05-15 DIAGNOSIS — I10 HTN (HYPERTENSION): ICD-10-CM

## 2023-05-15 RX ORDER — AMLODIPINE BESYLATE 2.5 MG/1
TABLET ORAL
Qty: 90 TABLET | Refills: 3 | Status: SHIPPED | OUTPATIENT
Start: 2023-05-15 | End: 2024-04-17

## 2023-05-15 RX ORDER — HYDROCHLOROTHIAZIDE 25 MG/1
TABLET ORAL
Qty: 90 TABLET | Refills: 3 | Status: SHIPPED | OUTPATIENT
Start: 2023-05-15 | End: 2023-09-15 | Stop reason: SINTOL

## 2023-05-15 NOTE — TELEPHONE ENCOUNTER
"    Last office visit: 5/2/2023     Future Appointments 5/15/2023 - 11/11/2023    None          Requested Prescriptions   Pending Prescriptions Disp Refills     hydrochlorothiazide (HYDRODIURIL) 25 MG tablet [Pharmacy Med Name: HYDROCHLOROTHIAZIDE 25MG TABLETS] 90 tablet 3     Sig: TAKE 1 TABLET(25 MG) BY MOUTH DAILY       Diuretics (Including Combos) Protocol Failed - 5/15/2023  3:47 AM        Failed - Normal serum sodium on file in past 12 months     Recent Labs   Lab Test 07/11/22  1513   *              Passed - Blood pressure under 140/90 in past 12 months     BP Readings from Last 3 Encounters:   05/02/23 136/84   02/15/23 124/76   02/09/22 124/76                 Passed - Recent (12 mo) or future (30 days) visit within the authorizing provider's specialty     Patient has had an office visit with the authorizing provider or a provider within the authorizing providers department within the previous 12 mos or has a future within next 30 days. See \"Patient Info\" tab in inbasket, or \"Choose Columns\" in Meds & Orders section of the refill encounter.              Passed - Medication is active on med list        Passed - Patient is age 18 or older        Passed - No active pregancy on record        Passed - Normal serum creatinine on file in past 12 months     Recent Labs   Lab Test 07/11/22  1513   CR 0.94              Passed - Normal serum potassium on file in past 12 months     Recent Labs   Lab Test 07/11/22  1513   POTASSIUM 4.1                    Passed - No positive pregnancy test in past 12 months         Signed Prescriptions Disp Refills    amLODIPine (NORVASC) 2.5 MG tablet 90 tablet 3     Sig: TAKE 1 TABLET(2.5 MG) BY MOUTH DAILY       Calcium Channel Blockers Protocol  Passed - 5/15/2023  3:47 AM        Passed - Blood pressure under 140/90 in past 12 months     BP Readings from Last 3 Encounters:   05/02/23 136/84   02/15/23 124/76   02/09/22 124/76                 Passed - Recent (12 mo) or future " "(30 days) visit within the authorizing provider's specialty     Patient has had an office visit with the authorizing provider or a provider within the authorizing providers department within the previous 12 mos or has a future within next 30 days. See \"Patient Info\" tab in inbasket, or \"Choose Columns\" in Meds & Orders section of the refill encounter.              Passed - Medication is active on med list        Passed - Patient is age 18 or older        Passed - No active pregnancy on record        Passed - Normal serum creatinine on file in past 12 months     Recent Labs   Lab Test 07/11/22  1513   CR 0.94       Ok to refill medication if creatinine is low          Passed - No positive pregnancy test in past 12 months          omeprazole (PRILOSEC) 20 MG DR capsule 90 capsule 3     Sig: TAKE 1 CAPSULE(20 MG) BY MOUTH DAILY       PPI Protocol Passed - 5/15/2023  3:47 AM        Passed - Not on Clopidogrel (unless Pantoprazole ordered)        Passed - No diagnosis of osteoporosis on record        Passed - Recent (12 mo) or future (30 days) visit within the authorizing provider's specialty     Patient has had an office visit with the authorizing provider or a provider within the authorizing providers department within the previous 12 mos or has a future within next 30 days. See \"Patient Info\" tab in inbasket, or \"Choose Columns\" in Meds & Orders section of the refill encounter.              Passed - Medication is active on med list        Passed - Patient is age 18 or older        Passed - No active pregnacy on record        Passed - No positive pregnancy test in past 12 months                   "

## 2023-05-23 ENCOUNTER — PATIENT OUTREACH (OUTPATIENT)
Dept: CARE COORDINATION | Facility: CLINIC | Age: 73
End: 2023-05-23
Payer: MEDICARE

## 2023-06-12 ENCOUNTER — PATIENT OUTREACH (OUTPATIENT)
Dept: CARE COORDINATION | Facility: CLINIC | Age: 73
End: 2023-06-12
Payer: MEDICARE

## 2023-06-20 ENCOUNTER — PATIENT OUTREACH (OUTPATIENT)
Dept: CARE COORDINATION | Facility: CLINIC | Age: 73
End: 2023-06-20
Payer: MEDICARE

## 2023-07-12 ASSESSMENT — ENCOUNTER SYMPTOMS
NERVOUS/ANXIOUS: 1
BREAST MASS: 0
ABDOMINAL PAIN: 1
DIARRHEA: 1

## 2023-07-12 ASSESSMENT — ACTIVITIES OF DAILY LIVING (ADL): CURRENT_FUNCTION: HOUSEWORK REQUIRES ASSISTANCE

## 2023-07-18 ASSESSMENT — PATIENT HEALTH QUESTIONNAIRE - PHQ9
10. IF YOU CHECKED OFF ANY PROBLEMS, HOW DIFFICULT HAVE THESE PROBLEMS MADE IT FOR YOU TO DO YOUR WORK, TAKE CARE OF THINGS AT HOME, OR GET ALONG WITH OTHER PEOPLE: VERY DIFFICULT
SUM OF ALL RESPONSES TO PHQ QUESTIONS 1-9: 17
SUM OF ALL RESPONSES TO PHQ QUESTIONS 1-9: 17

## 2023-07-19 ENCOUNTER — OFFICE VISIT (OUTPATIENT)
Dept: FAMILY MEDICINE | Facility: CLINIC | Age: 73
End: 2023-07-19
Attending: FAMILY MEDICINE
Payer: MEDICARE

## 2023-07-19 VITALS
TEMPERATURE: 97.5 F | WEIGHT: 186.9 LBS | OXYGEN SATURATION: 98 % | RESPIRATION RATE: 16 BRPM | HEART RATE: 76 BPM | DIASTOLIC BLOOD PRESSURE: 70 MMHG | SYSTOLIC BLOOD PRESSURE: 110 MMHG | HEIGHT: 61 IN | BODY MASS INDEX: 35.29 KG/M2

## 2023-07-19 DIAGNOSIS — F51.01 PRIMARY INSOMNIA: ICD-10-CM

## 2023-07-19 DIAGNOSIS — E66.812 CLASS 2 SEVERE OBESITY DUE TO EXCESS CALORIES WITH SERIOUS COMORBIDITY AND BODY MASS INDEX (BMI) OF 35.0 TO 35.9 IN ADULT (H): ICD-10-CM

## 2023-07-19 DIAGNOSIS — E66.01 CLASS 2 SEVERE OBESITY DUE TO EXCESS CALORIES WITH SERIOUS COMORBIDITY AND BODY MASS INDEX (BMI) OF 35.0 TO 35.9 IN ADULT (H): ICD-10-CM

## 2023-07-19 DIAGNOSIS — M62.838 MUSCLE SPASM: ICD-10-CM

## 2023-07-19 DIAGNOSIS — F41.1 GAD (GENERALIZED ANXIETY DISORDER): ICD-10-CM

## 2023-07-19 DIAGNOSIS — I10 PRIMARY HYPERTENSION: ICD-10-CM

## 2023-07-19 DIAGNOSIS — Z13.6 SCREENING FOR CARDIOVASCULAR CONDITION: ICD-10-CM

## 2023-07-19 DIAGNOSIS — Z00.00 ENCOUNTER FOR MEDICARE ANNUAL WELLNESS EXAM: Primary | ICD-10-CM

## 2023-07-19 DIAGNOSIS — G25.81 RESTLESS LEG SYNDROME: ICD-10-CM

## 2023-07-19 PROBLEM — M25.551 RIGHT HIP PAIN: Status: ACTIVE | Noted: 2023-03-18

## 2023-07-19 LAB
ANION GAP SERPL CALCULATED.3IONS-SCNC: 13 MMOL/L (ref 7–15)
BUN SERPL-MCNC: 23.2 MG/DL (ref 8–23)
CALCIUM SERPL-MCNC: 9.4 MG/DL (ref 8.8–10.2)
CHLORIDE SERPL-SCNC: 101 MMOL/L (ref 98–107)
CHOLEST SERPL-MCNC: 216 MG/DL
CREAT SERPL-MCNC: 1.23 MG/DL (ref 0.51–0.95)
DEPRECATED HCO3 PLAS-SCNC: 22 MMOL/L (ref 22–29)
ERYTHROCYTE [DISTWIDTH] IN BLOOD BY AUTOMATED COUNT: 12.4 % (ref 10–15)
GFR SERPL CREATININE-BSD FRML MDRD: 46 ML/MIN/1.73M2
GLUCOSE SERPL-MCNC: 99 MG/DL (ref 70–99)
HCT VFR BLD AUTO: 37.5 % (ref 35–47)
HDLC SERPL-MCNC: 59 MG/DL
HGB BLD-MCNC: 12.6 G/DL (ref 11.7–15.7)
LDLC SERPL CALC-MCNC: 132 MG/DL
MCH RBC QN AUTO: 31.3 PG (ref 26.5–33)
MCHC RBC AUTO-ENTMCNC: 33.6 G/DL (ref 31.5–36.5)
MCV RBC AUTO: 93 FL (ref 78–100)
NONHDLC SERPL-MCNC: 157 MG/DL
PLATELET # BLD AUTO: 377 10E3/UL (ref 150–450)
POTASSIUM SERPL-SCNC: 4.2 MMOL/L (ref 3.4–5.3)
RBC # BLD AUTO: 4.02 10E6/UL (ref 3.8–5.2)
SODIUM SERPL-SCNC: 136 MMOL/L (ref 136–145)
TRIGL SERPL-MCNC: 124 MG/DL
WBC # BLD AUTO: 7 10E3/UL (ref 4–11)

## 2023-07-19 PROCEDURE — 36415 COLL VENOUS BLD VENIPUNCTURE: CPT | Performed by: FAMILY MEDICINE

## 2023-07-19 PROCEDURE — 80061 LIPID PANEL: CPT | Performed by: FAMILY MEDICINE

## 2023-07-19 PROCEDURE — G0439 PPPS, SUBSEQ VISIT: HCPCS | Performed by: FAMILY MEDICINE

## 2023-07-19 PROCEDURE — 99214 OFFICE O/P EST MOD 30 MIN: CPT | Mod: 25 | Performed by: FAMILY MEDICINE

## 2023-07-19 PROCEDURE — 80048 BASIC METABOLIC PNL TOTAL CA: CPT | Performed by: FAMILY MEDICINE

## 2023-07-19 PROCEDURE — 85027 COMPLETE CBC AUTOMATED: CPT | Performed by: FAMILY MEDICINE

## 2023-07-19 RX ORDER — TRAZODONE HYDROCHLORIDE 50 MG/1
50 TABLET, FILM COATED ORAL
Qty: 90 TABLET | Refills: 3 | Status: SHIPPED | OUTPATIENT
Start: 2023-07-19 | End: 2024-08-05

## 2023-07-19 RX ORDER — ESCITALOPRAM OXALATE 10 MG/1
10 TABLET ORAL DAILY
Qty: 90 TABLET | Refills: 3 | Status: SHIPPED | OUTPATIENT
Start: 2023-07-19 | End: 2023-08-15

## 2023-07-19 ASSESSMENT — ENCOUNTER SYMPTOMS
ABDOMINAL PAIN: 1
ALLERGIC/IMMUNOLOGIC NEGATIVE: 1
DIARRHEA: 1
MUSCULOSKELETAL NEGATIVE: 1
NERVOUS/ANXIOUS: 1
BREAST MASS: 0
HEMATOLOGIC/LYMPHATIC NEGATIVE: 1
ENDOCRINE NEGATIVE: 1

## 2023-07-19 ASSESSMENT — ACTIVITIES OF DAILY LIVING (ADL): CURRENT_FUNCTION: HOUSEWORK REQUIRES ASSISTANCE

## 2023-07-19 NOTE — PROGRESS NOTES
The patient was provided with suggestions to help her develop a healthy physical lifestyle.  She is at risk for lack of exercise and has been provided with information to increase physical activity for the benefit of her well-being.  The patient reports that she has difficulty with activities of daily living. I have asked that the patient make a follow up appointment in *** weeks where this issue will be further evaluated and addressed.  The patient was provided with written information regarding signs of hearing loss.  Information on urinary incontinence and treatment options given to patient.  The patient was provided with suggestions to help her develop a healthy emotional lifestyle.  The patient s PHQ-9 score is consistent with moderate depression. She was provided with information regarding depression and was advised to schedule a follow up appointment in *** weeks to further address this issue.

## 2023-07-19 NOTE — PATIENT INSTRUCTIONS
Patient Education   Personalized Prevention Plan  You are due for the preventive services outlined below.  Your care team is available to assist you in scheduling these services.  If you have already completed any of these items, please share that information with your care team to update in your medical record.  Health Maintenance Due   Topic Date Due     URINE DRUG SCREEN  07/11/2023     ANNUAL REVIEW OF HM ORDERS  07/11/2023     Cholesterol Lab  07/27/2023     Your Health Risk Assessment indicates you feel you are not in good health    A healthy lifestyle helps keep the body fit and the mind alert. It helps protect you from disease, helps you fight disease, and helps prevent chronic disease (disease that doesn't go away) from getting worse. This is important as you get older and begin to notice twinges in muscles and joints and a decline in the strength and stamina you once took for granted. A healthy lifestyle includes good healthcare, good nutrition, weight control, recreation, and regular exercise. Avoid harmful substances and do what you can to keep safe. Another part of a healthy lifestyle is stay mentally active and socially involved.    Good healthcare     Have a wellness visit every year.     If you have new symptoms, let us know right away. Don't wait until the next checkup.     Take medicines exactly as prescribed and keep your medicines in a safe place. Tell us if your medicine causes problems.   Healthy diet and weight control     Eat 3 or 4 small, nutritious, low-fat, high-fiber meals a day. Include a variety of fruits, vegetables, and whole-grain foods.     Make sure you get enough calcium in your diet. Calcium, vitamin D, and exercise help prevent osteoporosis (bone thinning).     If you live alone, try eating with others when you can. That way you get a good meal and have company while you eat it.     Try to keep a healthy weight. If you eat more calories than your body uses for energy, it will  be stored as fat and you will gain weight.     Recreation   Recreation is not limited to sports and team events. It includes any activity that provides relaxation, interest, enjoyment, and exercise. Recreation provides an outlet for physical, mental, and social energy. It can give a sense of worth and achievement. It can help you stay healthy.    Mental Exercise and Social Involvement  Mental and emotional health is as important as physical health. Keep in touch with friends and family. Stay as active as possible. Continue to learn and challenge yourself.   Things you can do to stay mentally active are:    Learn something new, like a foreign language or musical instrument.     Play SCRABBLE or do crossword puzzles. If you cannot find people to play these games with you at home, you can play them with others on your computer through the Internet.     Join a games club--anything from card games to chess or checkers or lawn bowling.     Start a new hobby.     Go back to school.     Volunteer.     Read.   Keep up with world events.    Exercise for a Healthier Heart  You may wonder how you can improve the health of your heart. If you re thinking about exercise, you re on the right track. You don t need to become an athlete. But you do need a certain amount of brisk exercise to help strengthen your heart. If you have been diagnosed with a heart condition, your healthcare provider may advise exercise to help your condition. To help make exercise a habit, choose safe, fun activities.      Exercise with a friend. When activity is fun, you're more likely to stick with it.     Before you start  Check with your healthcare provider before starting an exercise program. This is especially important if you haven't been active for a while. It's also important if you have a long-term (chronic) health problem such as heart disease, diabetes, or obesity. Also check with your provider if you're at high risk for having these problems.    Why exercise?  Exercising regularly offers many healthy rewards. It can help you do all of these:     Improve your blood cholesterol level to help prevent further heart trouble.    Lower your blood pressure to help prevent a stroke or heart attack.    Control diabetes or reduce your risk of getting this disease.    Improve your heart and lung function.    Reach and stay at a healthy weight.    Make your muscles stronger so you can stay active.    Prevent falls and fractures by slowing the loss of bone mass (osteoporosis).    Manage stress better.    Improve your sense of self and your body image.  Exercise tips      Ease into your routine. Set small goals. Then build on them. Talk with your healthcare provider first before starting an exercise routine if you're not sure what your activity level should be.    Exercise on most days. Aim for a total of at least 150 minutes (2 hours and 30 minutes) or more of moderate-intensity aerobic activity each week. You could also do 75 minutes (1 hour and 15 minutes) or more of vigorous-intensity aerobic activity each week. Or try for a combination of both. Moderate activity means that you breathe heavier and your heart rate increases, but you can still talk. Think about doing at least 30 minutes of moderate exercise, 5 times a week. It's OK to work up to the 30-minute period over time. Examples of moderate-intensity activity are brisk walking, gardening, and water aerobics.    Step up your daily activity level.  Along with your exercise program, try being more active the whole day. Walk instead of drive. Or park further away so that you take more steps each day. Do more household tasks or yard work. You may not be able to meet the advised amount of physical activity. But doing some moderate- or vigorous-intensity aerobic activity can help reduce your risk for heart disease. Your healthcare provider can help you figure out what is best for you.    Choose 1 or more activities  you enjoy.  Walking is one of the easiest things you can do. You can also try swimming, riding a bike, dancing, or taking an exercise class.    Call 911  Call 911 right away if any of these occur:     Chest pain that doesn't go away quickly with rest    New burning, tightness, pressure, or heaviness in your chest, neck, shoulders, back, or arms    Abnormal or severe shortness of breath    A very fast or irregular heartbeat (palpitations)    Fainting  When to call your healthcare provider  Call your healthcare provider if you have any of these:     Dizziness or lightheadedness    Mild shortness of breath or chest pain    Increased or new joint or muscle pain    [employer] last reviewed this educational content on 7/1/2022 2000-2023 [employer]. All rights reserved. This information is not intended as a substitute for professional medical care. Always follow your healthcare professional's instructions.        Activities of Daily Living    Your Health Risk Assessment indicates you have difficulties with activities of daily living such as housework, bathing, preparing meals, taking medication, etc. Please make a follow up appointment for us to address this issue in more detail.    Signs of Hearing Loss  Hearing loss is a problem shared by many people. In fact, it's one of the most common health problems, particularly as people age. Most people aged 65 and older have some hearing loss. By age 80, almost everyone does. Hearing loss often occurs slowly over the years. So, you may not realize your hearing has gotten worse.   When sudden hearing loss occurs, it's important to contact your healthcare provider right away. Your provider will do a medical exam and a hearing exam as soon as possible. This is to help find the cause and type of your sudden hearing loss. Based on your diagnosis, your healthcare provider will discuss possible treatments.      Hearing much better with one ear can be a sign of hearing  loss.     Have your hearing checked  Call your healthcare provider if you:     Have to strain to hear normal conversation    Have to watch other people s faces very carefully to follow what they re saying    Need to ask people to repeat what they ve said    Often misunderstand what people are saying    Turn the volume of the television or radio up so high that others complain    Feel that people are mumbling when they re talking to you    Find that the effort to hear leaves you feeling tired and irritated    Notice, when using the phone, that you hear better with one ear than the other  [employer] last reviewed this educational content on 6/1/2022 2000-2023 [employer]. All rights reserved. This information is not intended as a substitute for professional medical care. Always follow your healthcare professional's instructions.          Urinary Incontinence, Female (Adult)   Urinary incontinence means loss of bladder control. This problem affects many women, especially as they get older. If you have incontinence, you may be embarrassed to ask for help. But know that this problem can be treated.   Types of Incontinence  There are different types of incontinence. Two of the main types are described here. You can have more than one type.     Stress incontinence. With this type, urine leaks when pressure (stress) is put on the bladder. This may happen when you cough, sneeze, or laugh. Stress incontinence most often occurs because the pelvic floor muscles that support the bladder and urethra are weak. This can happen after pregnancy and vaginal childbirth or a hysterectomy. It can also be due to excess body weight or hormone changes.    Urge incontinence (also called overactive bladder). With this type, a sudden urge to urinate is felt often. This may happen even though there may not be much urine in the bladder. The need to urinate often during the night is common. Urge incontinence most often occurs  because of bladder spasms. This may be due to bladder irritation or infection. Damage to bladder nerves or pelvic muscles, constipation, and certain medicines can also lead to urge incontinence.  Treatment depends on the cause. Further evaluation is needed to find the type you have. This will likely include an exam and certain tests. Based on the results, you and your healthcare provider can then plan treatment. Until a diagnosis is made, the home care tips below can help ease symptoms.   Home care    Do pelvic floor muscle exercises, if they are prescribed. The pelvic floor muscles help support the bladder and urethra. Many women find that their symptoms improve when doing special exercises that strengthen these muscles. To do the exercises, contract the muscles you would use to stop your stream of urine. But do this when you re not urinating. Hold for 10 seconds, then relax. Repeat 10 to 20 times in a row, at least 3 times a day. Your healthcare provider may give you other instructions for how to do the exercises and how often.    Keep a bladder diary. This helps track how often and how much you urinate over a set period of time. Bring this diary with you to your next visit with the provider. The information can help your provider learn more about your bladder problem.    Lose weight, if advised to by your provider. Extra weight puts pressure on the bladder. Your provider can help you create a weight-loss plan that s right for you. This may include exercising more and making certain diet changes.    Don't have foods and drinks that may irritate the bladder. These can include alcohol and caffeinated drinks.    Quit smoking. Smoking and other tobacco use can lead to a long-term (chronic) cough that strains the pelvic floor muscles. Smoking may also damage the bladder and urethra. Talk with your provider about treatments or methods you can use to quit smoking.    If drinking large amounts of fluid makes you have  symptoms, you may be advised to limit your fluid intake. You may also be advised to drink most of your fluids during the day and to limit fluids at night.    If you re worried about urine leakage or accidents, you may wear absorbent pads to catch urine. Change the pads often. This helps reduce discomfort. It may also reduce the risk of skin or bladder infections.    Follow-up care  Follow up with your healthcare provider, or as directed. It may take some to find the right treatment for your problem. But healthy lifestyle changes can be made right away. These include such things as exercising on a regular basis, eating a healthy diet, losing weight (if needed), and quitting smoking. Your treatment plan may include special therapies or medicines. Certain procedures or surgery may also be options. Talk about any questions you have with your provider.   When to seek medical advice  Call the healthcare provider right away if any of these occur:    Fever of 100.4 F (38 C) or higher, or as directed by your provider    Bladder pain or fullness    Belly swelling    Nausea or vomiting    Back pain    Weakness, dizziness, or fainting  [employer] last reviewed this educational content on 1/1/2020 2000-2022 [employer]. All rights reserved. This information is not intended as a substitute for professional medical care. Always follow your healthcare professional's instructions.        Your Health Risk Assessment indicates you feel you are not in good emotional health.    Recreation   Recreation is not limited to sports and team events. It includes any activity that provides relaxation, interest, enjoyment, and exercise. Recreation provides an outlet for physical, mental, and social energy. It can give a sense of worth and achievement. It can help you stay healthy.    Mental Exercise and Social Involvement  Mental and emotional health is as important as physical health. Keep in touch with friends and family. Stay as  "active as possible. Continue to learn and challenge yourself.   Things you can do to stay mentally active are:    Learn something new, like a foreign language or musical instrument.     Play SCRABBLE or do crossword puzzles. If you cannot find people to play these games with you at home, you can play them with others on your computer through the Internet.     Join a games club--anything from card games to chess or checkers or lawn bowling.     Start a new hobby.     Go back to school.     Volunteer.     Read.   Keep up with world events.    Depression and Suicide in Older Adults  Nearly 2 million older adults in the [place] have some type of depression. Some of them even take their own lives. Yet depression among older adults is often ignored. Learning about the warning signs of depression may help spare a loved one needless pain. You may also save a life.   What is depression?  Depression is a common and serious illness. It affects the way you think and feel. It is not a normal part of aging. It is not a sign of weakness, a character flaw, or something you can \"snap out of.\" Most people with depression need treatment to get better. The most common symptom is a feeling of deep sadness. People who are depressed also may seem tired and listless. And nothing seems to give them pleasure. It s normal to grieve or be sad sometimes. But sadness lessens or passes with time. Depression rarely goes away or improves on its own. Other symptoms of depression are:     Sleeping more or less than normal    Eating more or less than normal    Having headaches, stomachaches, or other pains that don t go away    Feeling nervous,  empty,  or worthless    Crying a lot    Thinking or talking about suicide or death    Loss of interest in activities previously enjoyed    Social isolation    Feeling confused or forgetful  What causes it?  The causes of depression aren t fully known. But it is thought to result from a complex blend of these " factors:     Biochemistry. Certain chemicals in the brain play a role.    Genes. Depression does run in families.    Life stress. Life stresses can also trigger depression in some people. Older adults often face many stressors. These may include isolation, the death of friends or a spouse, health problems, and financial concerns.    Chronic health conditions. This includes diabetes, heart disease, or cancer. These can cause symptoms of depression. Medicine side effects can cause changes in thoughts and behaviors.  Giving support    Depressed people often may not want to ask for help. When they do, they may be ignored. Or they may get the wrong treatment. You can help by showing parents and older friends love and support. If they seem depressed, don t lecture the person or ignore the symptoms. Don't discount the symptoms as a  normal  part of aging. They are not. Get involved, listen, and show interest and support.   Help them understand that depression is a treatable illness. Tell them you can help them find the right treatment. Offer to go to their healthcare provider's appointment with them for support when the symptoms are discussed. With their approval, contact a local mental health center, social service agency, or hospital about services.   Helping at healthcare visits  You can be an advocate for them at healthcare appointments. Many older adults have chronic illnesses. Many of these can cause symptoms of depression. Medicine side effects can change thoughts and behaviors.   You can help make sure that the healthcare provider looks at all of these factors. They should refer your family member or friend to a mental healthcare provider when needed. In some cases, untreated depression can lead to a misdiagnosis. A person may be diagnosed with a brain disorder such as dementia. If the healthcare provider does not take the issue of depression seriously, help your family member or friend find another provider.    Asking about self-harm thoughts  If you think an older person you care about could be suicidal, ask,  Have you thought about suicide?  Most people will tell you the truth. If they say yes, they may already have a plan for how and when they will attempt it. Find out as much as you can. The more detailed the plan, and the easier it is to carry out, the more danger the person is in right now. Tell the person you are there for them and you do not want them to get harmed. Don't wait to get help for the person. Call the person's healthcare provider, local hospital, or emergency services.   Call 988 in a crisis   Never leave the person alone. A person who is actively suicidal needs crisis care right away. They need constant supervision. Never leave the person out of sight. Call 988 Tell the crisis counselor you need help for a person who is thinking about suicide. The counselor will help you get the right level of crisis help. You may be advised to take the person to the nearest emergency room.   The National Suicide Prevention Lifeline is available at 988, or 528-086-OJDS (980-368-0655), or www.suicidepreventionlifeline.org. When you call or text 988, you will be connected to trained counselors who are part of the National Suicide Prevention Lifeline network. An online chat option is also available. Lifeline is free and available 24/7.   To learn more    National Suicide Prevention Lifeline at www.suicidepreventionlifeline.org  or 095-305-YCCS (031-471-5746)    National [place] on Mental Illness at www.jag.org  or 669-175-RTPA (234-706-2220)    [facility] [place] at www.nmha.org  or 162-157-6767    National [place] of Mental Health at www.nimh.nih.gov  or 268-229-1856    [employer] last reviewed this educational content on 7/1/2022 2000-2023 [employer]. All rights reserved. This information is not intended as a substitute for professional medical care. Always follow your healthcare professional's  instructions.

## 2023-07-19 NOTE — PROGRESS NOTES
"SUBJECTIVE:   Deann is a 73 year old who presents for Preventive Visit.      7/19/2023     2:14 PM   Additional Questions   Roomed by Li TOWNSEND CMA   Accompanied by None     Are you in the first 12 months of your Medicare coverage?  No    Healthy Habits:     In general, how would you rate your overall health?  Fair    Frequency of exercise:  None    Do you usually eat at least 4 servings of fruit and vegetables a day, include whole grains    & fiber and avoid regularly eating high fat or \"junk\" foods?  Yes    Taking medications regularly:  Yes    Barriers to taking medications:  None    Medication side effects:  None    Ability to successfully perform activities of daily living:  Housework requires assistance    Home Safety:  Lack of grab bars in the bathroom    Hearing Impairment:  Feel that people are mumbling or not speaking clearly    In the past 6 months, have you been bothered by leaking of urine? Yes    In general, how would you rate your overall mental or emotional health?  Fair    Additional concerns today:  Yes    1. Calf cramps noted primarily at night, consistent with restless leg syndrome as she feels better if she gets up and walks around and drinks water.  Has tried magnesium but its not helping.  Will check CBC and if abnormal consider iron testing.  2. Ocular migraines, has about 4x a month, her eye doctor is aware, gets mild headache with it, discussed preventive options but she prefer not to take additional medication.  She will continue to monitor at this time  3. . Refills reviewed.  Due for refills of tizanidine which she takes for muscle cramps.  Blood pressure medications were refilled previously.  4. Hip pain discussed, currently following up with [facility].  A hip replacement has been recommended but patient has been instructed to lose weight.  She has been trying diet and exercise without any improvement.  5. Obesity discussed, BMI is 35 likely contributing to hypertension and " interfering with her ability to proceed with hip replacement.  She has been trying diet and exercise without any improvement.  Would like to try medication to see if she could lose additional weight.    Patient notes that she has been a little more anxious and depressed.  Her only sister passed away recently.  They had a strained relationship and that has made processing the death more difficult for the patient.    Patient is due for lab work and will proceed with that today.    Have you ever done Advance Care Planning? (For example, a Health Directive, POLST, or a discussion with a medical provider or your loved ones about your wishes): No, advance care planning information given to patient to review.  Patient declined advance care planning discussion at this time.       Fall risk  Fallen 2 or more times in the past year?: No  Any fall with injury in the past year?: Yes    Cognitive Screening   1) Repeat 3 items (Leader, Season, Table)    2) Clock draw: NORMAL  3) 3 item recall: Recalls 3 objects  Results: 3 items recalled: COGNITIVE IMPAIRMENT LESS LIKELY    Mini-CogTM Copyright S Manuel. Licensed by the author for use in [facility]; reprinted with permission (ibis@[facility]). All rights reserved.      Do you have sleep apnea, excessive snoring or daytime drowsiness?: no    Reviewed and updated as needed this visit by clinical staff   Tobacco  Allergies  Meds  Problems  Med Hx  Surg Hx  Fam Hx          Reviewed and updated as needed this visit by Provider   Tobacco  Allergies  Meds  Problems  Med Hx  Surg Hx  Fam Hx         Social History     Tobacco Use     Smoking status: Former     Types: Cigarettes     Quit date:      Years since quittin.5     Passive exposure: Past     Smokeless tobacco: Never   Substance Use Topics     Alcohol use: Yes     Comment: occasional drink on weekend             2023    11:15 AM   Alcohol Use   Prescreen: >3 drinks/day or >7 drinks/week? No  "    Do you have a current opioid prescription? No  Do you use any other controlled substances or medications that are not prescribed by a provider? None      Current providers sharing in care for this patient include:   Patient Care Team:  Blossom Ramirez MD as PCP - General (Family Medicine)  Nato Obrien OD as PCP - Ophthalmology (Ophthalmology)  Blossom Ramirez MD as Assigned PCP    The following health maintenance items are reviewed in Epic and correct as of today:  Health Maintenance   Topic Date Due     URINE DRUG SCREEN  07/11/2023     LIPID  07/27/2023     HEPATITIS C SCREENING  05/02/2024 (Originally 5/29/1968)     COVID-19 Vaccine (4 - Moderna series) 05/02/2032 (Originally 2/11/2022)     INFLUENZA VACCINE (1) 09/01/2023     MEDICARE ANNUAL WELLNESS VISIT  07/19/2024     ANNUAL REVIEW OF HM ORDERS  07/19/2024     FALL RISK ASSESSMENT  07/19/2024     MAMMO SCREENING  06/29/2025     COLORECTAL CANCER SCREENING  05/11/2026     ADVANCE CARE PLANNING  07/19/2028     DEXA  03/14/2032     DTAP/TDAP/TD IMMUNIZATION (3 - Td or Tdap) 09/13/2032     PHQ-2 (once per calendar year)  Completed     Pneumococcal Vaccine: 65+ Years  Completed     ZOSTER IMMUNIZATION  Completed     IPV IMMUNIZATION  Aged Out     MENINGITIS IMMUNIZATION  Aged Out               Review of Systems   HENT: Negative.    Eyes: Positive for visual disturbance.   Gastrointestinal: Positive for abdominal pain and diarrhea.   Endocrine: Negative.    Breasts:  Negative for tenderness, breast mass and discharge.   Genitourinary: Negative for pelvic pain, vaginal bleeding and vaginal discharge.   Musculoskeletal: Negative.    Allergic/Immunologic: Negative.    Hematological: Negative.    Psychiatric/Behavioral: The patient is nervous/anxious.          OBJECTIVE:   /70   Pulse 76   Temp 97.5  F (36.4  C)   Resp 16   Ht 1.541 m (5' 0.67\")   Wt 84.8 kg (186 lb 14.4 oz)   LMP  (LMP Unknown)   SpO2 98%   BMI 35.70 kg/m   Estimated " "body mass index is 35.7 kg/m  as calculated from the following:    Height as of this encounter: 1.541 m (5' 0.67\").    Weight as of this encounter: 84.8 kg (186 lb 14.4 oz).  Physical Exam  Alert cooperative no acute distress  HEENT normocephalic atraumatic mucous membranes are moist  Neck supple no lymphadenopathy  Heart regular rate and rhythm  Lungs clear to auscultation bilaterally  On examination of her skin she has a healing wound on her left elbow, no evidence of infection        ASSESSMENT / PLAN:   Encounter for Medicare annual wellness exam  Health maintenance updated, preventive services reviewed, vaccines discussed, questions are answered, patient encouraged to continue annual wellness visits to review preventive services    MIGUELITO (generalized anxiety disorder)  On escitalopram, overall dose is effective, no change at this time  - escitalopram (LEXAPRO) 10 MG tablet; Take 1 tablet (10 mg) by mouth daily    Restless leg syndrome  Patient with restless leg syndrome, encouraged her to increase iron, okay to continue magnesium, check CBC.  If hemoglobin or hematocrit are abnormal or MCV is abnormal we will consider doing iron testing  - CBC with platelets; Future    Class 2 severe obesity due to excess calories with serious comorbidity and body mass index (BMI) of 35.0 to 35.9 in adult (H)  Patient with BMI greater than 35 contributing to hypertension unable to lose weight with dietary intervention, minimal ability to exercise due to hip pain, without further weight loss patient's risk with hip surgery is higher.  Will try low-dose Ozempic.  If not seeing improvement and tolerating can increase dose in the future.  - semaglutide (OZEMPIC) 2 MG/3ML pen; Inject 0.25 mg Subcutaneous every 7 days    Primary hypertension  Blood pressure is adequately controlled, labs pending  - Basic metabolic panel; Future    Primary insomnia  Uses trazodone as needed for sleep, refills today  - traZODone (DESYREL) 50 MG tablet; " "Take 1 tablet (50 mg) by mouth once as needed for sleep    Screening for cardiovascular condition  Labs pending  - Lipid panel reflex to direct LDL Fasting; Future    Muscle spasm  Intermittent muscle spasms particularly in the left arm, continue as needed muscle relaxer  - tiZANidine (ZANAFLEX) 4 MG tablet; Take 1 tablet (4 mg) by mouth 3 times daily as needed for muscle spasms      Patient has been advised of split billing requirements and indicates understanding: Yes      COUNSELING:  Reviewed preventive health counseling, as reflected in patient instructions       Healthy diet/nutrition       Fall risk prevention       Advanced Planning       BMI:   Estimated body mass index is 35.7 kg/m  as calculated from the following:    Height as of this encounter: 1.541 m (5' 0.67\").    Weight as of this encounter: 84.8 kg (186 lb 14.4 oz).   Weight management plan: Discussed healthy diet, activity as tolerated, trial of Ozempic      She reports that she quit smoking about 17 years ago. Her smoking use included cigarettes. She has been exposed to tobacco smoke. She has never used smokeless tobacco.      Appropriate preventive services were discussed with this patient, including applicable screening as appropriate for cardiovascular disease, diabetes, osteopenia/osteoporosis, and glaucoma.  As appropriate for age/gender, discussed screening for colorectal cancer, prostate cancer, breast cancer, and cervical cancer. Checklist reviewing preventive services available has been given to the patient.    Reviewed patients plan of care and provided an AVS. The Basic Care Plan (routine screening as documented in Health Maintenance) for Deann meets the Care Plan requirement. This Care Plan has been established and reviewed with the Patient.          Blossom Ramirez MD  [facility]    Identified Health Risks:    I have reviewed Opioid Use Disorder and Substance Use Disorder risk factors and made any " needed referrals.        The patient was provided with suggestions to help her develop a healthy physical lifestyle.  She is at risk for lack of exercise and has been provided with information to increase physical activity for the benefit of her well-being.  The patient reports that she has difficulty with activities of daily living.  Has adequate support   the patient was provided with written information regarding signs of hearing loss.  Information on urinary incontinence and treatment options given to patient.  The patient was provided with suggestions to help her develop a healthy emotional lifestyle.  The patient s PHQ-9 score is consistent with moderate depression.

## 2023-08-15 DIAGNOSIS — F41.1 GAD (GENERALIZED ANXIETY DISORDER): ICD-10-CM

## 2023-08-15 RX ORDER — ESCITALOPRAM OXALATE 10 MG/1
10 TABLET ORAL DAILY
Qty: 90 TABLET | Refills: 3 | Status: SHIPPED | OUTPATIENT
Start: 2023-08-15 | End: 2024-04-17

## 2023-08-16 ENCOUNTER — TRANSFERRED RECORDS (OUTPATIENT)
Dept: HEALTH INFORMATION MANAGEMENT | Facility: CLINIC | Age: 73
End: 2023-08-16
Payer: MEDICARE

## 2023-09-14 ENCOUNTER — OFFICE VISIT (OUTPATIENT)
Dept: FAMILY MEDICINE | Facility: CLINIC | Age: 73
End: 2023-09-14
Payer: MEDICARE

## 2023-09-14 VITALS
RESPIRATION RATE: 16 BRPM | SYSTOLIC BLOOD PRESSURE: 110 MMHG | BODY MASS INDEX: 32.8 KG/M2 | HEART RATE: 68 BPM | HEIGHT: 61 IN | DIASTOLIC BLOOD PRESSURE: 60 MMHG | OXYGEN SATURATION: 99 % | WEIGHT: 173.7 LBS

## 2023-09-14 DIAGNOSIS — Z01.818 PREOP GENERAL PHYSICAL EXAM: ICD-10-CM

## 2023-09-14 DIAGNOSIS — E66.812 CLASS 2 SEVERE OBESITY DUE TO EXCESS CALORIES WITH SERIOUS COMORBIDITY AND BODY MASS INDEX (BMI) OF 35.0 TO 35.9 IN ADULT (H): ICD-10-CM

## 2023-09-14 DIAGNOSIS — E66.01 CLASS 2 SEVERE OBESITY DUE TO EXCESS CALORIES WITH SERIOUS COMORBIDITY AND BODY MASS INDEX (BMI) OF 35.0 TO 35.9 IN ADULT (H): ICD-10-CM

## 2023-09-14 DIAGNOSIS — I10 PRIMARY HYPERTENSION: ICD-10-CM

## 2023-09-14 DIAGNOSIS — M16.10 HIP ARTHRITIS: Primary | ICD-10-CM

## 2023-09-14 DIAGNOSIS — G89.29 CHRONIC MIDLINE LOW BACK PAIN, UNSPECIFIED WHETHER SCIATICA PRESENT: ICD-10-CM

## 2023-09-14 DIAGNOSIS — M54.50 CHRONIC MIDLINE LOW BACK PAIN, UNSPECIFIED WHETHER SCIATICA PRESENT: ICD-10-CM

## 2023-09-14 DIAGNOSIS — R79.89 ELEVATED SERUM CREATININE: ICD-10-CM

## 2023-09-14 LAB
ANION GAP SERPL CALCULATED.3IONS-SCNC: 15 MMOL/L (ref 7–15)
BASOPHILS # BLD AUTO: 0 10E3/UL (ref 0–0.2)
BASOPHILS NFR BLD AUTO: 0 %
BUN SERPL-MCNC: 18.2 MG/DL (ref 8–23)
CALCIUM SERPL-MCNC: 10.4 MG/DL (ref 8.8–10.2)
CHLORIDE SERPL-SCNC: 91 MMOL/L (ref 98–107)
CREAT SERPL-MCNC: 1.3 MG/DL (ref 0.51–0.95)
DEPRECATED HCO3 PLAS-SCNC: 22 MMOL/L (ref 22–29)
EGFRCR SERPLBLD CKD-EPI 2021: 43 ML/MIN/1.73M2
EOSINOPHIL # BLD AUTO: 0 10E3/UL (ref 0–0.7)
EOSINOPHIL NFR BLD AUTO: 1 %
ERYTHROCYTE [DISTWIDTH] IN BLOOD BY AUTOMATED COUNT: 12 % (ref 10–15)
GLUCOSE SERPL-MCNC: 104 MG/DL (ref 70–99)
HBA1C MFR BLD: 5.1 % (ref 0–5.6)
HCT VFR BLD AUTO: 38.9 % (ref 35–47)
HGB BLD-MCNC: 13.2 G/DL (ref 11.7–15.7)
IMM GRANULOCYTES # BLD: 0 10E3/UL
IMM GRANULOCYTES NFR BLD: 0 %
LYMPHOCYTES # BLD AUTO: 2.4 10E3/UL (ref 0.8–5.3)
LYMPHOCYTES NFR BLD AUTO: 27 %
MCH RBC QN AUTO: 30.3 PG (ref 26.5–33)
MCHC RBC AUTO-ENTMCNC: 33.9 G/DL (ref 31.5–36.5)
MCV RBC AUTO: 89 FL (ref 78–100)
MONOCYTES # BLD AUTO: 0.6 10E3/UL (ref 0–1.3)
MONOCYTES NFR BLD AUTO: 6 %
NEUTROPHILS # BLD AUTO: 5.8 10E3/UL (ref 1.6–8.3)
NEUTROPHILS NFR BLD AUTO: 65 %
PLATELET # BLD AUTO: 511 10E3/UL (ref 150–450)
POTASSIUM SERPL-SCNC: 3.8 MMOL/L (ref 3.4–5.3)
RBC # BLD AUTO: 4.35 10E6/UL (ref 3.8–5.2)
SODIUM SERPL-SCNC: 128 MMOL/L (ref 136–145)
WBC # BLD AUTO: 8.8 10E3/UL (ref 4–11)

## 2023-09-14 PROCEDURE — 80048 BASIC METABOLIC PNL TOTAL CA: CPT | Performed by: FAMILY MEDICINE

## 2023-09-14 PROCEDURE — 85025 COMPLETE CBC W/AUTO DIFF WBC: CPT | Mod: QW | Performed by: FAMILY MEDICINE

## 2023-09-14 PROCEDURE — 99214 OFFICE O/P EST MOD 30 MIN: CPT | Mod: 25 | Performed by: FAMILY MEDICINE

## 2023-09-14 PROCEDURE — 83036 HEMOGLOBIN GLYCOSYLATED A1C: CPT | Performed by: FAMILY MEDICINE

## 2023-09-14 PROCEDURE — 36415 COLL VENOUS BLD VENIPUNCTURE: CPT | Performed by: FAMILY MEDICINE

## 2023-09-14 PROCEDURE — 93000 ELECTROCARDIOGRAM COMPLETE: CPT | Performed by: FAMILY MEDICINE

## 2023-09-14 RX ORDER — AMOXICILLIN 500 MG/1
2000 CAPSULE ORAL
COMMUNITY
End: 2024-02-08
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
## 2023-09-15 DIAGNOSIS — E87.1 HYPONATREMIA: Primary | ICD-10-CM

## 2023-09-20 ENCOUNTER — LAB (OUTPATIENT)
Dept: LAB | Facility: CLINIC | Age: 73
End: 2023-09-20
Payer: MEDICARE

## 2023-09-20 ENCOUNTER — ALLIED HEALTH/NURSE VISIT (OUTPATIENT)
Dept: FAMILY MEDICINE | Facility: CLINIC | Age: 73
End: 2023-09-20
Payer: MEDICARE

## 2023-09-20 VITALS — OXYGEN SATURATION: 97 % | SYSTOLIC BLOOD PRESSURE: 130 MMHG | DIASTOLIC BLOOD PRESSURE: 76 MMHG | HEART RATE: 84 BPM

## 2023-09-20 DIAGNOSIS — E87.1 HYPONATREMIA: ICD-10-CM

## 2023-09-20 DIAGNOSIS — Z01.30 BP CHECK: Primary | ICD-10-CM

## 2023-09-20 LAB
ANION GAP SERPL CALCULATED.3IONS-SCNC: 15 MMOL/L (ref 7–15)
BUN SERPL-MCNC: 17.5 MG/DL (ref 8–23)
CALCIUM SERPL-MCNC: 10.1 MG/DL (ref 8.8–10.2)
CHLORIDE SERPL-SCNC: 97 MMOL/L (ref 98–107)
CREAT SERPL-MCNC: 1.27 MG/DL (ref 0.51–0.95)
DEPRECATED HCO3 PLAS-SCNC: 22 MMOL/L (ref 22–29)
EGFRCR SERPLBLD CKD-EPI 2021: 44 ML/MIN/1.73M2
GLUCOSE SERPL-MCNC: 99 MG/DL (ref 70–99)
POTASSIUM SERPL-SCNC: 3.9 MMOL/L (ref 3.4–5.3)
SODIUM SERPL-SCNC: 134 MMOL/L (ref 136–145)

## 2023-09-20 PROCEDURE — 36415 COLL VENOUS BLD VENIPUNCTURE: CPT

## 2023-09-20 PROCEDURE — 80048 BASIC METABOLIC PNL TOTAL CA: CPT

## 2023-09-20 PROCEDURE — 99207 PR NO CHARGE NURSE ONLY: CPT

## 2023-09-20 NOTE — PROGRESS NOTES
I met with Deann Shah at the request of Dr. Ramirez to recheck her blood pressure.  Blood pressure medications on the med list were reviewed with patient.    Patient has taken all medications as per usual regimen: Yes  Patient reports tolerating them without any issues or concerns: Yes    Vitals:    09/20/23 1311   BP: 130/76   BP Location: Right arm   Patient Position: Sitting   Pulse: 84   SpO2: 97%       Blood pressure was taken, previous encounter was reviewed, recorded blood pressure below 140/90.  Patient was discharged and the note will be sent to the provider for final review.

## 2023-09-25 NOTE — PROGRESS NOTES
Discharge plan according to [facility]:       08/23/23 1120   Discharge Planning   Patient/Family Anticipates Transition to home   Living Arrangements   People in Home friend(s);other relative(s)   Type of Residence Private Residence   Is your private residence a single family home or apartment? Single family home   Number of Stairs, Within Home, Primary none   Once home, are you able to live on one level? Yes   Which level? Main Level   Bathroom Shower/Tub Walk-in shower   Equipment Currently Used at Home raised toilet seat   Support System   Support Systems Friends/Neighbors;Family Members   Do you have someone available to stay with you one or two nights once you are home? Yes

## 2023-10-04 ENCOUNTER — ANESTHESIA EVENT (OUTPATIENT)
Dept: SURGERY | Facility: CLINIC | Age: 73
End: 2023-10-04
Payer: MEDICARE

## 2023-10-05 ENCOUNTER — ANESTHESIA (OUTPATIENT)
Dept: SURGERY | Facility: CLINIC | Age: 73
End: 2023-10-05
Payer: MEDICARE

## 2023-10-05 ENCOUNTER — HOSPITAL ENCOUNTER (OUTPATIENT)
Facility: CLINIC | Age: 73
Discharge: HOME | End: 2023-10-06
Attending: ORTHOPAEDIC SURGERY | Admitting: ORTHOPAEDIC SURGERY
Payer: MEDICARE

## 2023-10-05 ENCOUNTER — APPOINTMENT (OUTPATIENT)
Dept: RADIOLOGY | Facility: CLINIC | Age: 73
End: 2023-10-05
Attending: PHYSICIAN ASSISTANT
Payer: MEDICARE

## 2023-10-05 ENCOUNTER — APPOINTMENT (OUTPATIENT)
Dept: PHYSICAL THERAPY | Facility: CLINIC | Age: 73
End: 2023-10-05
Attending: ORTHOPAEDIC SURGERY
Payer: MEDICARE

## 2023-10-05 DIAGNOSIS — M16.11 PRIMARY OSTEOARTHRITIS OF RIGHT HIP: Primary | ICD-10-CM

## 2023-10-05 PROBLEM — M16.10 PRIMARY OSTEOARTHRITIS OF HIP: Status: ACTIVE | Noted: 2023-10-05

## 2023-10-05 PROCEDURE — 272N000001 HC OR GENERAL SUPPLY STERILE: Performed by: ORTHOPAEDIC SURGERY

## 2023-10-05 PROCEDURE — 710N000010 HC RECOVERY PHASE 1, LEVEL 2, PER MIN: Performed by: ORTHOPAEDIC SURGERY

## 2023-10-05 PROCEDURE — 250N000011 HC RX IP 250 OP 636: Performed by: PHYSICIAN ASSISTANT

## 2023-10-05 PROCEDURE — C1776 JOINT DEVICE (IMPLANTABLE): HCPCS | Performed by: ORTHOPAEDIC SURGERY

## 2023-10-05 PROCEDURE — 250N000009 HC RX 250: Performed by: NURSE ANESTHETIST, CERTIFIED REGISTERED

## 2023-10-05 PROCEDURE — 250N000013 HC RX MED GY IP 250 OP 250 PS 637: Performed by: PHYSICIAN ASSISTANT

## 2023-10-05 PROCEDURE — 250N000011 HC RX IP 250 OP 636: Performed by: ORTHOPAEDIC SURGERY

## 2023-10-05 PROCEDURE — 999N000065 XR PELVIS AND HIP PORTABLE RIGHT 1 VIEW

## 2023-10-05 PROCEDURE — 999N000141 HC STATISTIC PRE-PROCEDURE NURSING ASSESSMENT: Performed by: ORTHOPAEDIC SURGERY

## 2023-10-05 PROCEDURE — 250N000025 HC SEVOFLURANE, PER MIN: Performed by: ORTHOPAEDIC SURGERY

## 2023-10-05 PROCEDURE — 250N000011 HC RX IP 250 OP 636: Performed by: NURSE ANESTHETIST, CERTIFIED REGISTERED

## 2023-10-05 PROCEDURE — 250N000011 HC RX IP 250 OP 636: Performed by: ANESTHESIOLOGY

## 2023-10-05 PROCEDURE — 258N000003 HC RX IP 258 OP 636: Performed by: ANESTHESIOLOGY

## 2023-10-05 PROCEDURE — 370N000017 HC ANESTHESIA TECHNICAL FEE, PER MIN: Performed by: ORTHOPAEDIC SURGERY

## 2023-10-05 PROCEDURE — 250N000013 HC RX MED GY IP 250 OP 250 PS 637: Performed by: STUDENT IN AN ORGANIZED HEALTH CARE EDUCATION/TRAINING PROGRAM

## 2023-10-05 PROCEDURE — 97116 GAIT TRAINING THERAPY: CPT | Mod: GP

## 2023-10-05 PROCEDURE — 250N000009 HC RX 250: Performed by: ORTHOPAEDIC SURGERY

## 2023-10-05 PROCEDURE — 250N000009 HC RX 250: Performed by: ANESTHESIOLOGY

## 2023-10-05 PROCEDURE — 97110 THERAPEUTIC EXERCISES: CPT | Mod: GP

## 2023-10-05 PROCEDURE — 99204 OFFICE O/P NEW MOD 45 MIN: CPT | Performed by: STUDENT IN AN ORGANIZED HEALTH CARE EDUCATION/TRAINING PROGRAM

## 2023-10-05 PROCEDURE — 258N000003 HC RX IP 258 OP 636: Performed by: ORTHOPAEDIC SURGERY

## 2023-10-05 PROCEDURE — 360N000077 HC SURGERY LEVEL 4, PER MIN: Performed by: ORTHOPAEDIC SURGERY

## 2023-10-05 PROCEDURE — 97161 PT EVAL LOW COMPLEX 20 MIN: CPT | Mod: GP

## 2023-10-05 DEVICE — BIOLOX DELTA CERAMIC FEMORAL HEAD +5.0 36MM DIA 12/14 TAPER
Type: IMPLANTABLE DEVICE | Site: HIP | Status: FUNCTIONAL
Brand: BIOLOX DELTA

## 2023-10-05 DEVICE — TRI-LOCK BPS FEMORAL STEM 12/14 TAPER TRI-LOCK BPS W/GRIPTION SIZE 6 HI 107MM
Type: IMPLANTABLE DEVICE | Site: HIP | Status: FUNCTIONAL
Brand: TRI-LOCK GRIPTION

## 2023-10-05 DEVICE — PINNACLE GRIPTION ACETABULAR SHELL SECTOR 52MM OD
Type: IMPLANTABLE DEVICE | Site: HIP | Status: FUNCTIONAL
Brand: PINNACLE GRIPTION

## 2023-10-05 DEVICE — PINNACLE HIP SOLUTIONS ALTRX POLYETHYLENE ACETABULAR LINER NEUTRAL 36MM ID 52MM OD
Type: IMPLANTABLE DEVICE | Site: HIP | Status: FUNCTIONAL
Brand: PINNACLE ALTRX

## 2023-10-05 RX ORDER — LIDOCAINE 40 MG/G
CREAM TOPICAL
Status: DISCONTINUED | OUTPATIENT
Start: 2023-10-05 | End: 2023-10-05 | Stop reason: HOSPADM

## 2023-10-05 RX ORDER — SODIUM CHLORIDE, SODIUM LACTATE, POTASSIUM CHLORIDE, CALCIUM CHLORIDE 600; 310; 30; 20 MG/100ML; MG/100ML; MG/100ML; MG/100ML
INJECTION, SOLUTION INTRAVENOUS CONTINUOUS
Status: DISCONTINUED | OUTPATIENT
Start: 2023-10-05 | End: 2023-10-06 | Stop reason: HOSPADM

## 2023-10-05 RX ORDER — POLYETHYLENE GLYCOL 3350 17 G/17G
17 POWDER, FOR SOLUTION ORAL DAILY
Status: DISCONTINUED | OUTPATIENT
Start: 2023-10-06 | End: 2023-10-06 | Stop reason: HOSPADM

## 2023-10-05 RX ORDER — HYDROMORPHONE HCL IN WATER/PF 6 MG/30 ML
0.4 PATIENT CONTROLLED ANALGESIA SYRINGE INTRAVENOUS EVERY 5 MIN PRN
Status: DISCONTINUED | OUTPATIENT
Start: 2023-10-05 | End: 2023-10-05 | Stop reason: HOSPADM

## 2023-10-05 RX ORDER — PROPOFOL 10 MG/ML
INJECTION, EMULSION INTRAVENOUS PRN
Status: DISCONTINUED | OUTPATIENT
Start: 2023-10-05 | End: 2023-10-05

## 2023-10-05 RX ORDER — SODIUM CHLORIDE, SODIUM LACTATE, POTASSIUM CHLORIDE, CALCIUM CHLORIDE 600; 310; 30; 20 MG/100ML; MG/100ML; MG/100ML; MG/100ML
INJECTION, SOLUTION INTRAVENOUS CONTINUOUS
Status: DISCONTINUED | OUTPATIENT
Start: 2023-10-05 | End: 2023-10-05 | Stop reason: HOSPADM

## 2023-10-05 RX ORDER — OXYCODONE HYDROCHLORIDE 5 MG/1
5 TABLET ORAL EVERY 4 HOURS PRN
Status: DISCONTINUED | OUTPATIENT
Start: 2023-10-05 | End: 2023-10-06 | Stop reason: HOSPADM

## 2023-10-05 RX ORDER — CALCIUM CARBONATE 500 MG/1
500 TABLET, CHEWABLE ORAL DAILY PRN
Status: DISCONTINUED | OUTPATIENT
Start: 2023-10-05 | End: 2023-10-06 | Stop reason: HOSPADM

## 2023-10-05 RX ORDER — NALOXONE HYDROCHLORIDE 0.4 MG/ML
0.2 INJECTION, SOLUTION INTRAMUSCULAR; INTRAVENOUS; SUBCUTANEOUS
Status: DISCONTINUED | OUTPATIENT
Start: 2023-10-05 | End: 2023-10-06 | Stop reason: HOSPADM

## 2023-10-05 RX ORDER — FENTANYL CITRATE 50 UG/ML
25 INJECTION, SOLUTION INTRAMUSCULAR; INTRAVENOUS EVERY 5 MIN PRN
Status: DISCONTINUED | OUTPATIENT
Start: 2023-10-05 | End: 2023-10-05 | Stop reason: HOSPADM

## 2023-10-05 RX ORDER — OXYCODONE HYDROCHLORIDE 5 MG/1
2.5-5 TABLET ORAL EVERY 4 HOURS PRN
Qty: 26 TABLET | Refills: 0 | Status: SHIPPED | OUTPATIENT
Start: 2023-10-05 | End: 2023-10-06

## 2023-10-05 RX ORDER — MEPERIDINE HYDROCHLORIDE 50 MG/ML
12.5 INJECTION INTRAMUSCULAR; INTRAVENOUS; SUBCUTANEOUS EVERY 5 MIN PRN
Status: DISCONTINUED | OUTPATIENT
Start: 2023-10-05 | End: 2023-10-05 | Stop reason: HOSPADM

## 2023-10-05 RX ORDER — NALOXONE HYDROCHLORIDE 0.4 MG/ML
0.4 INJECTION, SOLUTION INTRAMUSCULAR; INTRAVENOUS; SUBCUTANEOUS
Status: DISCONTINUED | OUTPATIENT
Start: 2023-10-05 | End: 2023-10-06 | Stop reason: HOSPADM

## 2023-10-05 RX ORDER — FENTANYL CITRATE 50 UG/ML
50 INJECTION, SOLUTION INTRAMUSCULAR; INTRAVENOUS EVERY 5 MIN PRN
Status: DISCONTINUED | OUTPATIENT
Start: 2023-10-05 | End: 2023-10-05 | Stop reason: HOSPADM

## 2023-10-05 RX ORDER — ASPIRIN 81 MG/1
81 TABLET ORAL 2 TIMES DAILY
Qty: 60 TABLET | Refills: 0 | Status: SHIPPED | OUTPATIENT
Start: 2023-10-05 | End: 2023-10-06

## 2023-10-05 RX ORDER — CEFAZOLIN SODIUM/WATER 2 G/20 ML
2 SYRINGE (ML) INTRAVENOUS SEE ADMIN INSTRUCTIONS
Status: DISCONTINUED | OUTPATIENT
Start: 2023-10-05 | End: 2023-10-05 | Stop reason: HOSPADM

## 2023-10-05 RX ORDER — PROPOFOL 10 MG/ML
INJECTION, EMULSION INTRAVENOUS CONTINUOUS PRN
Status: DISCONTINUED | OUTPATIENT
Start: 2023-10-05 | End: 2023-10-05

## 2023-10-05 RX ORDER — ASPIRIN 81 MG/1
81 TABLET ORAL 2 TIMES DAILY WITH MEALS
Status: DISCONTINUED | OUTPATIENT
Start: 2023-10-05 | End: 2023-10-06 | Stop reason: HOSPADM

## 2023-10-05 RX ORDER — AMLODIPINE BESYLATE 2.5 MG/1
2.5 TABLET ORAL DAILY
Status: DISCONTINUED | OUTPATIENT
Start: 2023-10-05 | End: 2023-10-06 | Stop reason: HOSPADM

## 2023-10-05 RX ORDER — ESCITALOPRAM OXALATE 10 MG/1
10 TABLET ORAL DAILY
Status: DISCONTINUED | OUTPATIENT
Start: 2023-10-05 | End: 2023-10-06 | Stop reason: HOSPADM

## 2023-10-05 RX ORDER — FENTANYL CITRATE 50 UG/ML
INJECTION, SOLUTION INTRAMUSCULAR; INTRAVENOUS PRN
Status: DISCONTINUED | OUTPATIENT
Start: 2023-10-05 | End: 2023-10-05

## 2023-10-05 RX ORDER — HALOPERIDOL 5 MG/ML
1 INJECTION INTRAMUSCULAR
Status: DISCONTINUED | OUTPATIENT
Start: 2023-10-05 | End: 2023-10-05 | Stop reason: HOSPADM

## 2023-10-05 RX ORDER — HYDROMORPHONE HCL IN WATER/PF 6 MG/30 ML
0.2 PATIENT CONTROLLED ANALGESIA SYRINGE INTRAVENOUS EVERY 5 MIN PRN
Status: DISCONTINUED | OUTPATIENT
Start: 2023-10-05 | End: 2023-10-05 | Stop reason: HOSPADM

## 2023-10-05 RX ORDER — GLYCOPYRROLATE 0.2 MG/ML
INJECTION, SOLUTION INTRAMUSCULAR; INTRAVENOUS PRN
Status: DISCONTINUED | OUTPATIENT
Start: 2023-10-05 | End: 2023-10-05

## 2023-10-05 RX ORDER — ACETAMINOPHEN 325 MG/1
650 TABLET ORAL EVERY 4 HOURS PRN
Status: DISCONTINUED | OUTPATIENT
Start: 2023-10-08 | End: 2023-10-06 | Stop reason: HOSPADM

## 2023-10-05 RX ORDER — TRANEXAMIC ACID 650 MG/1
1950 TABLET ORAL ONCE
Status: COMPLETED | OUTPATIENT
Start: 2023-10-05 | End: 2023-10-05

## 2023-10-05 RX ORDER — ONDANSETRON 4 MG/1
4 TABLET, ORALLY DISINTEGRATING ORAL EVERY 6 HOURS PRN
Status: DISCONTINUED | OUTPATIENT
Start: 2023-10-05 | End: 2023-10-06 | Stop reason: HOSPADM

## 2023-10-05 RX ORDER — CEFAZOLIN SODIUM/WATER 2 G/20 ML
2 SYRINGE (ML) INTRAVENOUS
Status: COMPLETED | OUTPATIENT
Start: 2023-10-05 | End: 2023-10-05

## 2023-10-05 RX ORDER — HYDROXYZINE HYDROCHLORIDE 10 MG/1
10 TABLET, FILM COATED ORAL EVERY 6 HOURS PRN
Status: DISCONTINUED | OUTPATIENT
Start: 2023-10-05 | End: 2023-10-06 | Stop reason: HOSPADM

## 2023-10-05 RX ORDER — ALBUTEROL SULFATE 0.83 MG/ML
2.5 SOLUTION RESPIRATORY (INHALATION) EVERY 4 HOURS PRN
Status: DISCONTINUED | OUTPATIENT
Start: 2023-10-05 | End: 2023-10-05 | Stop reason: HOSPADM

## 2023-10-05 RX ORDER — PROCHLORPERAZINE MALEATE 5 MG
5 TABLET ORAL EVERY 6 HOURS PRN
Status: DISCONTINUED | OUTPATIENT
Start: 2023-10-05 | End: 2023-10-06 | Stop reason: HOSPADM

## 2023-10-05 RX ORDER — ACETAMINOPHEN 325 MG/1
975 TABLET ORAL EVERY 8 HOURS
Status: DISCONTINUED | OUTPATIENT
Start: 2023-10-05 | End: 2023-10-06 | Stop reason: HOSPADM

## 2023-10-05 RX ORDER — HYDROMORPHONE HCL IN WATER/PF 6 MG/30 ML
0.1 PATIENT CONTROLLED ANALGESIA SYRINGE INTRAVENOUS
Status: DISCONTINUED | OUTPATIENT
Start: 2023-10-05 | End: 2023-10-06 | Stop reason: HOSPADM

## 2023-10-05 RX ORDER — HYDROXYZINE HYDROCHLORIDE 10 MG/1
10 TABLET, FILM COATED ORAL EVERY 6 HOURS PRN
Qty: 30 TABLET | Refills: 0 | Status: SHIPPED | OUTPATIENT
Start: 2023-10-05 | End: 2023-10-06

## 2023-10-05 RX ORDER — LABETALOL HYDROCHLORIDE 5 MG/ML
10 INJECTION, SOLUTION INTRAVENOUS
Status: DISCONTINUED | OUTPATIENT
Start: 2023-10-05 | End: 2023-10-05 | Stop reason: HOSPADM

## 2023-10-05 RX ORDER — ONDANSETRON 4 MG/1
4 TABLET, ORALLY DISINTEGRATING ORAL EVERY 30 MIN PRN
Status: DISCONTINUED | OUTPATIENT
Start: 2023-10-05 | End: 2023-10-05 | Stop reason: HOSPADM

## 2023-10-05 RX ORDER — HYDROMORPHONE HYDROCHLORIDE 1 MG/ML
0.3 INJECTION, SOLUTION INTRAMUSCULAR; INTRAVENOUS; SUBCUTANEOUS
Status: DISCONTINUED | OUTPATIENT
Start: 2023-10-05 | End: 2023-10-06 | Stop reason: HOSPADM

## 2023-10-05 RX ORDER — ONDANSETRON 2 MG/ML
4 INJECTION INTRAMUSCULAR; INTRAVENOUS EVERY 30 MIN PRN
Status: DISCONTINUED | OUTPATIENT
Start: 2023-10-05 | End: 2023-10-05 | Stop reason: HOSPADM

## 2023-10-05 RX ORDER — LOSARTAN POTASSIUM 50 MG/1
100 TABLET ORAL DAILY
Status: DISCONTINUED | OUTPATIENT
Start: 2023-10-06 | End: 2023-10-06 | Stop reason: HOSPADM

## 2023-10-05 RX ORDER — AMOXICILLIN 250 MG
1 CAPSULE ORAL 2 TIMES DAILY
Status: DISCONTINUED | OUTPATIENT
Start: 2023-10-05 | End: 2023-10-06 | Stop reason: HOSPADM

## 2023-10-05 RX ORDER — ACETAMINOPHEN 325 MG/1
650 TABLET ORAL EVERY 4 HOURS PRN
Qty: 100 TABLET | Refills: 0 | Status: SHIPPED | OUTPATIENT
Start: 2023-10-05 | End: 2023-10-06

## 2023-10-05 RX ORDER — LIDOCAINE 40 MG/G
CREAM TOPICAL
Status: DISCONTINUED | OUTPATIENT
Start: 2023-10-05 | End: 2023-10-06 | Stop reason: HOSPADM

## 2023-10-05 RX ORDER — DEXAMETHASONE SODIUM PHOSPHATE 4 MG/ML
INJECTION, SOLUTION INTRA-ARTICULAR; INTRALESIONAL; INTRAMUSCULAR; INTRAVENOUS; SOFT TISSUE PRN
Status: DISCONTINUED | OUTPATIENT
Start: 2023-10-05 | End: 2023-10-05

## 2023-10-05 RX ORDER — ONDANSETRON 2 MG/ML
4 INJECTION INTRAMUSCULAR; INTRAVENOUS EVERY 6 HOURS PRN
Status: DISCONTINUED | OUTPATIENT
Start: 2023-10-05 | End: 2023-10-06 | Stop reason: HOSPADM

## 2023-10-05 RX ORDER — AMOXICILLIN 250 MG
1-2 CAPSULE ORAL 2 TIMES DAILY
Qty: 30 TABLET | Refills: 0 | Status: SHIPPED | OUTPATIENT
Start: 2023-10-05 | End: 2023-10-06

## 2023-10-05 RX ORDER — BISACODYL 10 MG
10 SUPPOSITORY, RECTAL RECTAL DAILY PRN
Status: DISCONTINUED | OUTPATIENT
Start: 2023-10-05 | End: 2023-10-06 | Stop reason: HOSPADM

## 2023-10-05 RX ORDER — CEFAZOLIN SODIUM 1 G/3ML
1 INJECTION, POWDER, FOR SOLUTION INTRAMUSCULAR; INTRAVENOUS EVERY 8 HOURS
Status: COMPLETED | OUTPATIENT
Start: 2023-10-05 | End: 2023-10-06

## 2023-10-05 RX ADMIN — TRANEXAMIC ACID 1950 MG: 650 TABLET ORAL at 06:29

## 2023-10-05 RX ADMIN — MEPERIDINE HYDROCHLORIDE 12.5 MG: 50 INJECTION INTRAMUSCULAR; INTRAVENOUS; SUBCUTANEOUS at 09:55

## 2023-10-05 RX ADMIN — FENTANYL CITRATE 50 MCG: 50 INJECTION INTRAMUSCULAR; INTRAVENOUS at 07:24

## 2023-10-05 RX ADMIN — Medication 2 G: at 07:25

## 2023-10-05 RX ADMIN — DEXAMETHASONE SODIUM PHOSPHATE 6 MG: 4 INJECTION, SOLUTION INTRA-ARTICULAR; INTRALESIONAL; INTRAMUSCULAR; INTRAVENOUS; SOFT TISSUE at 07:39

## 2023-10-05 RX ADMIN — FENTANYL CITRATE 50 MCG: 50 INJECTION INTRAMUSCULAR; INTRAVENOUS at 07:39

## 2023-10-05 RX ADMIN — CEFAZOLIN 1 G: 1 INJECTION, POWDER, FOR SOLUTION INTRAMUSCULAR; INTRAVENOUS at 17:19

## 2023-10-05 RX ADMIN — OXYCODONE HYDROCHLORIDE 5 MG: 5 TABLET ORAL at 14:09

## 2023-10-05 RX ADMIN — OMEPRAZOLE 40 MG: 20 CAPSULE, DELAYED RELEASE ORAL at 18:10

## 2023-10-05 RX ADMIN — ACETAMINOPHEN 975 MG: 325 TABLET ORAL at 20:21

## 2023-10-05 RX ADMIN — FENTANYL CITRATE 50 MCG: 50 INJECTION INTRAMUSCULAR; INTRAVENOUS at 09:28

## 2023-10-05 RX ADMIN — HYDROMORPHONE HYDROCHLORIDE 0.25 MG: 1 INJECTION, SOLUTION INTRAMUSCULAR; INTRAVENOUS; SUBCUTANEOUS at 09:29

## 2023-10-05 RX ADMIN — FENTANYL CITRATE 25 MCG: 50 INJECTION INTRAMUSCULAR; INTRAVENOUS at 09:01

## 2023-10-05 RX ADMIN — SUGAMMADEX 50 MG: 100 INJECTION, SOLUTION INTRAVENOUS at 09:12

## 2023-10-05 RX ADMIN — FENTANYL CITRATE 25 MCG: 50 INJECTION, SOLUTION INTRAMUSCULAR; INTRAVENOUS at 09:50

## 2023-10-05 RX ADMIN — SODIUM CHLORIDE, POTASSIUM CHLORIDE, SODIUM LACTATE AND CALCIUM CHLORIDE: 600; 310; 30; 20 INJECTION, SOLUTION INTRAVENOUS at 05:55

## 2023-10-05 RX ADMIN — HYDROMORPHONE HYDROCHLORIDE 0.25 MG: 1 INJECTION, SOLUTION INTRAMUSCULAR; INTRAVENOUS; SUBCUTANEOUS at 07:58

## 2023-10-05 RX ADMIN — ASPIRIN 81 MG: 81 TABLET, COATED ORAL at 20:20

## 2023-10-05 RX ADMIN — SODIUM CHLORIDE, POTASSIUM CHLORIDE, SODIUM LACTATE AND CALCIUM CHLORIDE: 600; 310; 30; 20 INJECTION, SOLUTION INTRAVENOUS at 06:27

## 2023-10-05 RX ADMIN — PROPOFOL 50 MCG/KG/MIN: 10 INJECTION, EMULSION INTRAVENOUS at 07:46

## 2023-10-05 RX ADMIN — FENTANYL CITRATE 25 MCG: 50 INJECTION INTRAMUSCULAR; INTRAVENOUS at 09:23

## 2023-10-05 RX ADMIN — OXYCODONE HYDROCHLORIDE 5 MG: 5 TABLET ORAL at 10:15

## 2023-10-05 RX ADMIN — OXYCODONE HYDROCHLORIDE 5 MG: 5 TABLET ORAL at 18:10

## 2023-10-05 RX ADMIN — SENNOSIDES AND DOCUSATE SODIUM 1 TABLET: 50; 8.6 TABLET ORAL at 20:21

## 2023-10-05 RX ADMIN — HYDROMORPHONE HYDROCHLORIDE 0.3 MG: 1 INJECTION, SOLUTION INTRAMUSCULAR; INTRAVENOUS; SUBCUTANEOUS at 16:56

## 2023-10-05 RX ADMIN — GLYCOPYRROLATE 0.2 MG: 0.2 INJECTION INTRAMUSCULAR; INTRAVENOUS at 08:30

## 2023-10-05 RX ADMIN — FENTANYL CITRATE 50 MCG: 50 INJECTION INTRAMUSCULAR; INTRAVENOUS at 09:13

## 2023-10-05 RX ADMIN — FENTANYL CITRATE 50 MCG: 50 INJECTION INTRAMUSCULAR; INTRAVENOUS at 08:21

## 2023-10-05 RX ADMIN — LIDOCAINE HYDROCHLORIDE 30 MG: 10 INJECTION, SOLUTION EPIDURAL; INFILTRATION; INTRACAUDAL; PERINEURAL at 07:39

## 2023-10-05 RX ADMIN — AMLODIPINE BESYLATE 2.5 MG: 2.5 TABLET ORAL at 18:11

## 2023-10-05 RX ADMIN — PROPOFOL 150 MG: 10 INJECTION, EMULSION INTRAVENOUS at 07:39

## 2023-10-05 RX ADMIN — SODIUM CHLORIDE, POTASSIUM CHLORIDE, SODIUM LACTATE AND CALCIUM CHLORIDE: 600; 310; 30; 20 INJECTION, SOLUTION INTRAVENOUS at 08:24

## 2023-10-05 RX ADMIN — FENTANYL CITRATE 25 MCG: 50 INJECTION, SOLUTION INTRAMUSCULAR; INTRAVENOUS at 09:40

## 2023-10-05 RX ADMIN — SUGAMMADEX 100 MG: 100 INJECTION, SOLUTION INTRAVENOUS at 09:18

## 2023-10-05 RX ADMIN — ROCURONIUM BROMIDE 50 MG: 10 INJECTION, SOLUTION INTRAVENOUS at 07:39

## 2023-10-05 RX ADMIN — ACETAMINOPHEN 975 MG: 325 TABLET ORAL at 10:15

## 2023-10-05 RX ADMIN — ESCITALOPRAM OXALATE 10 MG: 10 TABLET ORAL at 18:11

## 2023-10-05 RX ADMIN — HYDROXYZINE HYDROCHLORIDE 10 MG: 10 TABLET ORAL at 14:09

## 2023-10-05 RX ADMIN — HYDROMORPHONE HYDROCHLORIDE 0.5 MG: 1 INJECTION, SOLUTION INTRAMUSCULAR; INTRAVENOUS; SUBCUTANEOUS at 08:11

## 2023-10-05 RX ADMIN — FENTANYL CITRATE 50 MCG: 50 INJECTION, SOLUTION INTRAMUSCULAR; INTRAVENOUS at 10:00

## 2023-10-05 ASSESSMENT — ACTIVITIES OF DAILY LIVING (ADL)
ADLS_ACUITY_SCORE: 24
ADLS_ACUITY_SCORE: 23
ADLS_ACUITY_SCORE: 24
ADLS_ACUITY_SCORE: 24
ADLS_ACUITY_SCORE: 26
ADLS_ACUITY_SCORE: 24
ADLS_ACUITY_SCORE: 22
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
## 2023-10-05 NOTE — ANESTHESIA POSTPROCEDURE EVALUATION
Patient: Deann Shah    Procedure: Procedure(s):  RIGHT TOTAL HIP ARTHROPLASTY       Anesthesia Type:  General    Note:  Disposition: Inpatient   Postop Pain Control: Uneventful            Sign Out: Well controlled pain   PONV: No   Neuro/Psych: Uneventful            Sign Out: Acceptable/Baseline neuro status   Airway/Respiratory: Uneventful            Sign Out: Acceptable/Baseline resp. status   CV/Hemodynamics: Uneventful            Sign Out: Acceptable CV status   Other NRE:    DID A NON-ROUTINE EVENT OCCUR?            Last vitals:  Vitals Value Taken Time   /56 10/05/23 1050   Temp 36.2  C (97.1  F) 10/05/23 0930   Pulse 83 10/05/23 1059   Resp 12 10/05/23 1040   SpO2 99 % 10/05/23 1059   Vitals shown include unvalidated device data.    Electronically Signed By: Faraz Infante MD  October 5, 2023  11:00 AM

## 2023-10-05 NOTE — ANESTHESIA PROCEDURE NOTES
"Intrathecal injection Procedure Note    Pre-Procedure   Staff -        Anesthesiologist:  Faraz Infante MD       Performed By: anesthesiologist       Location: OR       Procedure Start/Stop Times: 10/5/2023 7:29 AM and 10/5/2023 7:36 AM       Pre-Anesthestic Checklist: patient identified, IV checked, risks and benefits discussed, informed consent, monitors and equipment checked, pre-op evaluation, at physician/surgeon's request and post-op pain management  Timeout:       Correct Patient: Yes        Correct Procedure: Yes        Correct Site: Yes        Correct Position: Yes   Procedure Documentation  Procedure: intrathecal injection       Patient Position: sitting       Patient Prep/Sterile Barriers: sterile gloves, mask, patient draped       Skin prep: Chloraprep       Insertion Site: L3-4. (midline approach).       Needle Gauge: 25.        Needle Length (Inches): 4        Spinal Needle Type: Pencan       Introducer used       Introducer: 20 G       # of attempts: 2 and  # of redirects:     Assessment/Narrative         Paresthesias: No.       CSF fluid: clear.       Opening pressure was cmH2O while  Sitting.      Medication(s) Administered   Medication Administration Time: 10/5/2023 7:29 AM     Comments:  Hands washed prior to procedure. The lower back was prepped and draped using sterile technique with sterile gloves, mask, hat, and sterile drape. Sterile prep was allowed to dry for 3 minutes prior to placing spinal.   Patient with severe scoliosis. No success despite attempt x2. Procedure aborted and converted to GETA.       FOR [facility] ([facility]) ONLY:   Pain Team Contact information: please page the Pain Team Via [employer]. Search \"Pain\". During daytime hours, please page the attending first. At night please page the resident first.      "
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
## 2023-10-05 NOTE — PHARMACY-ADMISSION MEDICATION HISTORY
Pharmacist Admission Medication History    Admission medication history is complete. The information provided in this note is only as accurate as the sources available at the time of the update.    Information Source(s): Patient and CareEverywhere/SureScripts via in-person    Pertinent Information:       Allergies reviewed with patient and updates made in EHR: no    Medication History Completed By: Bobbi Lin RPH 10/5/2023 7:11 AM    PTA Med List   Medication Sig Last Dose    acetaminophen (TYLENOL) 325 MG tablet Take 2 tablets (650 mg) by mouth every 4 hours as needed for other (mild pain)     amLODIPine (NORVASC) 2.5 MG tablet TAKE 1 TABLET(2.5 MG) BY MOUTH DAILY 10/4/2023 at PM    aspirin 81 MG EC tablet Take 1 tablet (81 mg) by mouth 2 times daily     Calcium Carbonate-Vitamin D (CALCIUM + D) 600-200 MG-UNIT per tablet Take 1 tablet by mouth every evening 10/4/2023 at PM    escitalopram (LEXAPRO) 10 MG tablet TAKE 1 TABLET(10 MG) BY MOUTH DAILY 10/4/2023 at PM    hydrOXYzine (ATARAX) 10 MG tablet Take 1 tablet (10 mg) by mouth every 6 hours as needed for itching or anxiety (with pain, moderate pain)     losartan (COZAAR) 100 MG tablet TAKE 1 TABLET(100 MG) BY MOUTH DAILY 10/4/2023 at PM    omeprazole (PRILOSEC) 20 MG DR capsule TAKE 1 CAPSULE(20 MG) BY MOUTH DAILY 10/5/2023 at AM    oxyCODONE (ROXICODONE) 5 MG tablet Take 0.5-1 tablets (2.5-5 mg) by mouth every 4 hours as needed for moderate to severe pain     semaglutide (OZEMPIC) 2 MG/3ML pen Inject 0.25 mg Subcutaneous every 7 days 9/27/2023    senna-docusate (SENOKOT-S/PERICOLACE) 8.6-50 MG tablet Take 1-2 tablets by mouth 2 times daily Take while on oral narcotics to prevent or treat constipation.     tiZANidine (ZANAFLEX) 4 MG tablet Take 1 tablet (4 mg) by mouth 3 times daily as needed for muscle spasms not recent at .    traZODone (DESYREL) 50 MG tablet Take 1 tablet (50 mg) by mouth once as needed for sleep not recent

## 2023-10-05 NOTE — ANESTHESIA PROCEDURE NOTES
Airway       Patient location during procedure: OR       Procedure Start/Stop Times: 10/5/2023 7:42 AM  Staff -        CRNA: Gino Timmons APRN CRNA       Performed By: CRNA  Consent for Airway        Urgency: elective  Indications and Patient Condition       Indications for airway management: colt-procedural       Induction type:intravenous       Mask difficulty assessment: 1 - vent by mask    Final Airway Details       Final airway type: endotracheal airway       Successful airway: ETT - single  Endotracheal Airway Details        ETT size (mm): 7.0       Cuffed: yes       Successful intubation technique: direct laryngoscopy       DL Blade Type: L[place] 2       Grade View of Cords: 1       Adjucts: stylet       Secured at (cm): 23       Bite block used: Soft    Post intubation assessment        Placement verified by: capnometry, equal breath sounds and chest rise        Number of attempts at approach: 1       Number of other approaches attempted: 0       Secured with: silk tape       Ease of procedure: easy       Dentition: Intact       Dental guard used and removed. Dental Guard Type: Proguard Red.    Medication(s) Administered   Medication Administration Time: 10/5/2023 7:42 AM

## 2023-10-05 NOTE — PLAN OF CARE
Vss. A&O. Pain rated r/t right hip surgical pain. Scheduled tylenol and PRN oxy and atarax given to address. Plan for pt to discharge home with family once medically cleared. Will continue to monitor.

## 2023-10-05 NOTE — INTERVAL H&P NOTE
"I have reviewed the surgical (or preoperative) H&P that is linked to this encounter, and examined the patient. There are no significant changes    Clinical Conditions Present on Arrival:  Clinically Significant Risk Factors Present on Admission         # Hyponatremia: Lowest Na = 128 mmol/L in last 30 days, will monitor as appropriate  # Hypercalcemia: Highest Ca = 10.4 mg/dL in last 30 days, will monitor as appropriate         # Obesity: Estimated body mass index is 35.31 kg/m  as calculated from the following:    Height as of this encounter: 1.549 m (5' 1\").    Weight as of this encounter: 84.8 kg (186 lb 14.4 oz).       "

## 2023-10-05 NOTE — ANESTHESIA CARE TRANSFER NOTE
Patient: Deann Shah    Procedure: Procedure(s):  RIGHT TOTAL HIP ARTHROPLASTY       Diagnosis: Osteoarthritis of right hip [M16.11]  Diagnosis Additional Information: No value filed.    Anesthesia Type:   General     Note:    Oropharynx: spontaneously breathing  Level of Consciousness: awake  Oxygen Supplementation: face mask  Level of Supplemental Oxygen (L/min / FiO2): 6  Independent Airway: airway patency satisfactory and stable  Dentition: dentition unchanged  Vital Signs Stable: post-procedure vital signs reviewed and stable  Report to RN Given: handoff report given  Patient transferred to: PACU    Handoff Report: Identifed the Patient, Identified the Reponsible Provider, Reviewed the pertinent medical history, Discussed the surgical course, Reviewed Intra-OP anesthesia mangement and issues during anesthesia, Set expectations for post-procedure period and Allowed opportunity for questions and acknowledgement of understanding      Vitals:  Vitals Value Taken Time   /87 10/05/23 0931   Temp 36.2  C (97.1  F) 10/05/23 0930   Pulse 88 10/05/23 0933   Resp 17 10/05/23 0933   SpO2 96 % 10/05/23 0933   Vitals shown include unvalidated device data.    Electronically Signed By: YANIRA Christianson CRNA  October 5, 2023  9:34 AM

## 2023-10-05 NOTE — INTERVAL H&P NOTE
"I have reviewed the surgical (or preoperative) H&P that is linked to this encounter, and examined the patient. There are no significant changes    Clinical Conditions Present on Arrival:  Clinically Significant Risk Factors Present on Admission         # Hyponatremia: Lowest Na = 128 mmol/L in last 30 days, will monitor as appropriate  # Hypercalcemia: Highest Ca = 10.4 mg/dL in last 30 days, will monitor as appropriate         # Obesity: Estimated body mass index is 32.69 kg/m  as calculated from the following:    Height as of this encounter: 1.549 m (5' 1\").    Weight as of this encounter: 78.5 kg (173 lb).       "

## 2023-10-05 NOTE — OP NOTE
Operative Report    PATIENT Deann Shah   DATE OF SURGERY:  10/5/2023      PREOPERATIVE DIAGNOSIS   Osteoarthritis of right hip [M16.11].    POSTOPERATIVE DIAGNOSIS   Osteoarthritis of right hip [M16.11].    PROCEDURE PERFORMED   right total hip arthroplasty     IMPLANTS  1.  DePuy [place] Sector Gription shell, 52mm  2.  DePuy [place] ALTRX liner, neutral, 52/36 mm  3.  DePuy TriLock femoral stem, Size 6 high offset  4.  DePuy Biolox ceramic femoral head, 36/+5 mm    SURGEON  Scott Wu, DO     ASSISTANT   Gerry Larry PA-C. PA-C was needed for positioning, draping, retraction/protection of vital structures, assistance with instrumentation and correct implant placement, deep periarticular injection, closure including deep capsular layer and maintaining patient safety throughout the procedure.  Several of these duties can only be performed by a FLACA and not a lesser trained surgical assistant.    ANESTHESIA  Spinal      FINDINGS:  Grade 4 Degenerative changes on femoral head and acetabulum with multiple osteophytes and exposed bone    SPECIMENS:  None.    ESTIMATED BLOOD LOSS:  150 cc    COMPLICATIONS   None.      INDICATION FOR PROCEDURE  Deann Shah is a pleasant, 73 year old female who presented to my office with severe right hip pain.  X-rays were performed and showed severe end-stage osteoarthritis.  Conservative treatment was attempted and failed.  Pain continued to affect quality of life and activities of daily living.  Due to this, total hip arthroplasty was recommended.  Risks, benefits and expected outcomes were discussed.  So is likely postoperative course.  Following this the patient elected to proceed.  Medical clearance was obtained prior to the procedure.    INFORMED CONSENT  Deann Shah was identified in the preoperative holding area and was identified using medical record number, name, and date of birth, all of which were confirmed. The operative hip was marked using an indelible marker  and informed consent was signed. Once again, all risks and benefits as well as alternatives to surgical intervention were discussed with the patient in detail and all the questions were answered. Risks discussed included but were not limited to: persistent pain, infection, bleeding, scarring, stiffness, thromboembolic events, fracture, malalignment/malrotation, leg length discrepancy, implant complications, severe limb dysfunction, loss of limb, and loss of life. The patient signed informed consent and wished to proceed with surgery as scheduled.     TECHNIQUE  The patient was taken back to the operating room and placed on the operating table.  Spinal anesthesia was performed without difficulty.  The patient was then placed in the lateral decubitus position with the operative hip elevated, held in place by a hip positioner.  All bony prominences were well-padded and an axillary roll was used.  The operative hip was then sterilely prepped and draped in usual fashion.  A timeout was performed prior to the procedure, this verified the patient's name, correct site and side of surgery, and the procedure being performed.  Implants needed were available.  It was confirmed that the patient received TXA and IV antibiotics prior to the start of the procedure.     I then made a 6 inch incision centered over the greater trochanter.  Hemostasis was obtained and dissection was performed with electrocautery down to the fascial layer.  This was split with electrocautery as well and the gluteal musculature was gently split in line with its fibers.  Charnley retractor was then placed and a minimally invasive posterior approach was performed taking down the capsule and external rotators in one layer.  These were tagged for later repair.  The hip was then dislocated and brought into the field.  An oscillating saw was used to make a femoral neck cut at the templated length proximal to the lesser trochanter.  The femoral head and neck  was removed.  There was significant degenerative changes seen.    Retractors were then placed to expose the acetabulum.  The labrum and overlying tissue was excised from the acetabular rim.  Sequential reaming was then performed to 1 mm below the final cup size, which was 52 mm.  The final shell was impacted into place in approximately 40  of abduction and 30  of anteversion.  Good scratch fixation was obtained.  A neutral trial liner was then placed as well.  Acetabular osteophytes were resected from the anterior acetabulum.    Attention was then taken to the femur and the femoral elevator was used for exposure.  Remaining lateral shoulder soft tissue was removed.  A box osteotome followed by canal finder were used. Sequential broaching was performed until good axial and rotational stability was found, with anteversion of 20 degrees.  This was with a size 6 broach. A trial reduction was performed with a high offset neck and +5 mm head. This gave recuperation of equal leg lengths, and stability through full range of motion was achieved.  The patient had stability with flexion to 90  and internal rotation to 80 .  There was no anterior instability as well.  There were no signs of impingement.    Following this the trials were removed including the trial liner. The cup was irrigated and dried, and then the final liner was impacted into place.  The femur was then reexposed and the final stem was impacted and seated at the same height as the trial.  One final trial reduction was performed and it showed the similar stability that was seen during the earlier trialing period.  This size was selected and the final ceramic head was impacted into place onto a clean, dry taper.  The hip was easily reduced, and the wound was then copiously irrigated with normal saline. A dillute betadine soak lavage was performed as well.    Capsular closure was performed with #5 Ethibond suture.  This gave an overall stout repair.  Following  this, 100 mL of periarticular injection was placed around the capsule fascial layer and subcutaneous tissue.  The remaining tissue was closed in layers.  The fascia was closed with #1 stratafix, followed by 0 Vicryl, 2-0 stratafix, 3-0 Monocryl and glue closure for skin. A sterile Aquacel dressing was placed over the incision.  Pillow placed between the patient's legs and the patient was transferred to the supine position in the postoperative bed. They were transferred to PACU in stable condition.    COMMENTS:  There were no complications during the case.  The patient will weight-bear as tolerated, no formal hip precautions.  Mechanical and chemical DVT prophylaxis will be initiated.    Implant Name Type Inv. Item Serial No.  Lot No. LRB No. Used Action   IMP INSERT HIP DEPUY PINNACLE ALTRX [id number] - [id number] Total Joint Component/Insert IMP INSERT HIP DEPUY PINNACLE ALTRX [id number]  [employer]- [id number] Right 1 Implanted   IMP SHELL ACET DEPUY PINNACLE GRIPTION 52MM [id number] - [id number] Total Joint Component/Insert IMP SHELL ACET DEPUY PINNACLE GRIPTION 52MM [id number]  [employer]- [id number] Right 1 Implanted   STEM FEMUR TRI-LOCK BPS SZ 6 HI OFFSET - [id number] Total Joint Component/Insert STEM FEMUR TRI-LOCK BPS SZ 6 HI OFFSET  [employer]- [id number] Right 1 Implanted   IMP HEAD FEMORAL DEPUY CERAMIC 36MM +5MM [id number] - [id number] Total Joint Component/Insert IMP HEAD FEMORAL DEPUY CERAMIC 36MM +5MM [id number]  [employer]- [id number] Right 1 Implanted       Scott Wu DO

## 2023-10-05 NOTE — PROGRESS NOTES
10/05/23 8066   Appointment Info   Signing Clinician's Name / Credentials (PT) Beto Porter   Quick Adds   Quick Adds Certification   Living Environment   People in Home alone   Current Living Arrangements house   Home Accessibility stairs to enter home;stairs within home   Number of Stairs, Main Entrance 1   Stair Railings, Main Entrance railings safe and in good condition   Number of Stairs, Within Home, Primary none   Living Environment Comments will stay at friends home or return to her home with assist   Self-Care   Usual Activity Tolerance moderate   Current Activity Tolerance moderate   Equipment Currently Used at Home raised toilet seat   Fall history within last six months no   Activity/Exercise/Self-Care Comment Independent with all I ADL's and ADL's   General Information   Onset of Illness/Injury or Date of Surgery 10/05/23   Pertinent History of Current Problem (include personal factors and/or comorbidities that impact the POC) R NICKY   Existing Precautions/Restrictions no known precautions/restrictions   Weight-Bearing Status - RLE weight-bearing as tolerated   Cognition   Affect/Mental Status (Cognition) WFL   Follows Commands (Cognition) WFL   Posture    Posture Not impaired   Range of Motion (ROM)   Range of Motion ROM deficits secondary to surgical procedure   Strength (Manual Muscle Testing)   Strength (Manual Muscle Testing) No deficits observed during functional mobility   Transfers   Transfers sit-stand transfer   Sit-Stand Transfer   Sit-Stand [place] (Transfers) contact guard;verbal cues   Assistive Device (Sit-Stand Transfers) walker, rolling platform  (Left platform attached)   Gait/Stairs (Locomotion)   [place] Level (Gait) contact guard;verbal cues   Assistive Device (Gait) walker, rolling platform;other (see comments)  (L platform attached)   Distance in Feet 20   Distance in Feet (Gait) 70, 80   Pattern (Gait) step-to   Deviations/Abnormal Patterns (Gait) gait speed  decreased;christopher decreased   Maintains Weight-bearing Status (Gait) able to maintain   Balance   Balance no deficits were identified   Coordination   Coordination no deficits were identified   Clinical Impression   Criteria for Skilled Therapeutic Intervention Yes, treatment indicated   PT Diagnosis (PT) impaired functional mobilty   Influenced by the following impairments weakness, pain   Functional limitations due to impairments transfers, gait   Clinical Presentation (PT Evaluation Complexity) Stable/Uncomplicated   Clinical Presentation Rationale Patient presents as medically diagnosed   Clinical Decision Making (Complexity) low complexity   Planned Therapy Interventions (PT) gait training;home exercise program;patient/family education;stair training;transfer training   Anticipated Equipment Needs at Discharge (PT) cane, straight   Risk & Benefits of therapy have been explained evaluation/treatment results reviewed;risks/benefits reviewed;care plan/treatment goals reviewed;patient   PT Total Evaluation Time   PT Eval, Low Complexity Minutes (75964) 10   Plan of Care Review   Plan of Care Reviewed With patient   Therapy Certification   Start of care date 10/05/23   Certification date from 10/05/23   Certification date to 11/05/23   Physical Therapy Goals   PT Frequency Daily   PT Predicted Duration/Target Date for Goal Attainment 10/07/23   PT Goals Transfers;Gait;Stairs;PT Goal 1   PT: Transfers Modified independent;Sit to/from stand;Assistive device;Within precautions   PT: Gait Straight cane;100 feet   PT: Stairs Minimal assist;2 stairs;Rail on right;Rail on left   PT: Goal 1 Indep with HEP   Interventions   Interventions Quick Adds Gait Training;Therapeutic Procedure   Therapeutic Procedure/Exercise   Ther. Procedure: strength, endurance, ROM, flexibillity Minutes (12162) 15   Symptoms Noted During/After Treatment none   Treatment Detail/Skilled Intervention NICKY Hep x 10, vc for technique   Gait Training    Gait Training Minutes (13752) 15   Symptoms Noted During/After Treatment (Gait Training) fatigue   Treatment Detail/Skilled Intervention slight unsteadiness with initial standing but after that did well each time she stood.  Patient initially ambulating with FWW with platform attachment on L side but felt it was too cumbersome and not that much of assist.  Nex able STS with only SPC with CGA and then ambulated with slow halting gait.  Patient gait/stability appear to be same with cane vs walker.  Cued for walker and cane use and for safety   [place] Level (Gait Training) contact guard   Physical Assistance Level (Gait Training) set-up required;verbal cues   Weight Bearing (Gait Training) weight-bearing as tolerated   Assistive Device (Gait Training) straight cane;rolling walker;other (see comments)  (Platform attach on walker)   Pattern Analysis (Gait Training) swing-through gait   Gait Analysis Deviations decreased christopher;decreased step length   Impairments (Gait Analysis/Training) pain;strength decreased   PT Discharge Planning   PT Plan review HEP briefly, progress with gait/stairs as able   PT Discharge Recommendation (DC Rec)   (per ortho MD)   PT Brief overview of current status Patient CGA for gait/transfers   Total Session Time   Timed Code Treatment Minutes 30   Total Session Time (sum of timed and untimed services) 40   [facility]  OUTPATIENT PHYSICAL THERAPY EVALUATION  PLAN OF TREATMENT FOR OUTPATIENT REHABILITATION  (COMPLETE FOR INITIAL CLAIMS ONLY)  Patient's Last Name, First Name, M.I.  YOB: 1950  Deann Shah                        Provider's Name  [facility] Medical Record No.  [id number]                             Onset Date:  10/05/23   Start of Care Date:  10/05/23   Type:     _X_PT   ___OT   ___SLP Medical Diagnosis:                 PT Diagnosis:  impaired functional mobilty Visits from SOC:  1     See  note for plan of treatment, functional goals and certification details    I CERTIFY THE NEED FOR THESE SERVICES FURNISHED UNDER        THIS PLAN OF TREATMENT AND WHILE UNDER MY CARE     (Physician co-signature of this document indicates review and certification of the therapy plan).

## 2023-10-06 ENCOUNTER — APPOINTMENT (OUTPATIENT)
Dept: PHYSICAL THERAPY | Facility: CLINIC | Age: 73
End: 2023-10-06
Attending: ORTHOPAEDIC SURGERY
Payer: MEDICARE

## 2023-10-06 ENCOUNTER — APPOINTMENT (OUTPATIENT)
Dept: OCCUPATIONAL THERAPY | Facility: CLINIC | Age: 73
End: 2023-10-06
Attending: ORTHOPAEDIC SURGERY
Payer: MEDICARE

## 2023-10-06 VITALS
TEMPERATURE: 98.5 F | WEIGHT: 173 LBS | HEART RATE: 71 BPM | RESPIRATION RATE: 16 BRPM | SYSTOLIC BLOOD PRESSURE: 144 MMHG | BODY MASS INDEX: 32.66 KG/M2 | HEIGHT: 61 IN | DIASTOLIC BLOOD PRESSURE: 67 MMHG | OXYGEN SATURATION: 98 %

## 2023-10-06 LAB
FASTING STATUS PATIENT QL REPORTED: NO
GLUCOSE SERPL-MCNC: 113 MG/DL (ref 70–99)
HGB BLD-MCNC: 10.8 G/DL (ref 11.7–15.7)

## 2023-10-06 PROCEDURE — 97166 OT EVAL MOD COMPLEX 45 MIN: CPT | Mod: GO | Performed by: OCCUPATIONAL THERAPIST

## 2023-10-06 PROCEDURE — 97116 GAIT TRAINING THERAPY: CPT | Mod: GP

## 2023-10-06 PROCEDURE — 36415 COLL VENOUS BLD VENIPUNCTURE: CPT | Performed by: PHYSICIAN ASSISTANT

## 2023-10-06 PROCEDURE — 250N000011 HC RX IP 250 OP 636: Performed by: PHYSICIAN ASSISTANT

## 2023-10-06 PROCEDURE — 250N000013 HC RX MED GY IP 250 OP 250 PS 637: Performed by: PHYSICIAN ASSISTANT

## 2023-10-06 PROCEDURE — 85018 HEMOGLOBIN: CPT | Performed by: PHYSICIAN ASSISTANT

## 2023-10-06 PROCEDURE — 97535 SELF CARE MNGMENT TRAINING: CPT | Mod: GO | Performed by: OCCUPATIONAL THERAPIST

## 2023-10-06 PROCEDURE — 97110 THERAPEUTIC EXERCISES: CPT | Mod: GP

## 2023-10-06 PROCEDURE — 250N000013 HC RX MED GY IP 250 OP 250 PS 637: Performed by: STUDENT IN AN ORGANIZED HEALTH CARE EDUCATION/TRAINING PROGRAM

## 2023-10-06 PROCEDURE — 99215 OFFICE O/P EST HI 40 MIN: CPT | Performed by: STUDENT IN AN ORGANIZED HEALTH CARE EDUCATION/TRAINING PROGRAM

## 2023-10-06 PROCEDURE — 82947 ASSAY GLUCOSE BLOOD QUANT: CPT | Performed by: ORTHOPAEDIC SURGERY

## 2023-10-06 RX ORDER — AMOXICILLIN 250 MG
1-2 CAPSULE ORAL 2 TIMES DAILY
Qty: 30 TABLET | Refills: 0 | Status: SHIPPED | OUTPATIENT
Start: 2023-10-06 | End: 2024-04-17

## 2023-10-06 RX ORDER — ACETAMINOPHEN 325 MG/1
650 TABLET ORAL EVERY 4 HOURS PRN
Qty: 100 TABLET | Refills: 0 | Status: SHIPPED | OUTPATIENT
Start: 2023-10-06 | End: 2024-05-30

## 2023-10-06 RX ORDER — HYDROXYZINE HYDROCHLORIDE 10 MG/1
10 TABLET, FILM COATED ORAL EVERY 6 HOURS PRN
Qty: 30 TABLET | Refills: 0 | Status: SHIPPED | OUTPATIENT
Start: 2023-10-06

## 2023-10-06 RX ORDER — OXYCODONE HYDROCHLORIDE 5 MG/1
2.5-5 TABLET ORAL EVERY 4 HOURS PRN
Qty: 26 TABLET | Refills: 0 | Status: SHIPPED | OUTPATIENT
Start: 2023-10-06 | End: 2024-04-17

## 2023-10-06 RX ORDER — ASPIRIN 81 MG/1
81 TABLET ORAL 2 TIMES DAILY
Qty: 60 TABLET | Refills: 0 | Status: SHIPPED | OUTPATIENT
Start: 2023-10-06 | End: 2024-04-17

## 2023-10-06 RX ADMIN — ACETAMINOPHEN 975 MG: 325 TABLET ORAL at 03:03

## 2023-10-06 RX ADMIN — HYDROXYZINE HYDROCHLORIDE 10 MG: 10 TABLET ORAL at 10:23

## 2023-10-06 RX ADMIN — ESCITALOPRAM OXALATE 10 MG: 10 TABLET ORAL at 10:51

## 2023-10-06 RX ADMIN — ASPIRIN 81 MG: 81 TABLET, COATED ORAL at 10:51

## 2023-10-06 RX ADMIN — OXYCODONE HYDROCHLORIDE 5 MG: 5 TABLET ORAL at 11:23

## 2023-10-06 RX ADMIN — AMLODIPINE BESYLATE 2.5 MG: 2.5 TABLET ORAL at 10:23

## 2023-10-06 RX ADMIN — POLYETHYLENE GLYCOL 3350 17 G: 17 POWDER, FOR SOLUTION ORAL at 10:24

## 2023-10-06 RX ADMIN — SENNOSIDES AND DOCUSATE SODIUM 1 TABLET: 50; 8.6 TABLET ORAL at 10:24

## 2023-10-06 RX ADMIN — LOSARTAN POTASSIUM 100 MG: 50 TABLET, FILM COATED ORAL at 10:23

## 2023-10-06 RX ADMIN — CEFAZOLIN 1 G: 1 INJECTION, POWDER, FOR SOLUTION INTRAMUSCULAR; INTRAVENOUS at 01:09

## 2023-10-06 RX ADMIN — OXYCODONE HYDROCHLORIDE 5 MG: 5 TABLET ORAL at 07:40

## 2023-10-06 RX ADMIN — ACETAMINOPHEN 975 MG: 325 TABLET ORAL at 10:23

## 2023-10-06 RX ADMIN — OXYCODONE HYDROCHLORIDE 5 MG: 5 TABLET ORAL at 03:03

## 2023-10-06 RX ADMIN — OMEPRAZOLE 40 MG: 20 CAPSULE, DELAYED RELEASE ORAL at 10:24

## 2023-10-06 ASSESSMENT — ACTIVITIES OF DAILY LIVING (ADL)
ADLS_ACUITY_SCORE: 23

## 2023-10-06 NOTE — PROGRESS NOTES
Physical Therapy Discharge Summary    Reason for therapy discharge:    All goals and outcomes met, no further needs identified.    Progress towards therapy goal(s). See goals on Care Plan in [facility] electronic health record for goal details.  Goals met    Therapy recommendation(s):    Continue home exercise program.

## 2023-10-06 NOTE — PLAN OF CARE
Patient vital signs are at baseline: Yes  Patient able to ambulate as they were prior to admission or with assist devices provided by therapies during their stay:  Yes  Patient MUST void prior to discharge:  Yes  Patient able to tolerate oral intake:  Yes  Pain has adequate pain control using Oral analgesics:  No,  Reason:  IV dilaudid x1  Does patient have an identified :  yes  Has goal D/C date and time been discussed with patient:  Yes    Pt A&Ox4. VSS on RA. CMS intact. Dressing c/d/i. Voiding adequately. 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 Pain managed with PRN oxycodone 5 mg and IV dilaudid x1. LR at 75 ml/hr. Discharge tomorrow pending progress.

## 2023-10-06 NOTE — PROGRESS NOTES
Care Management Discharge Note    Discharge Date: 10/06/2023       Discharge Disposition: Home        Additional Information:  Chart reviewed, pt plans to discharge home, has a friend that will assist as needed.  No CM needs indicated at this time.    SUZANNE Van

## 2023-10-06 NOTE — DISCHARGE SUMMARY
"ORTHOPEDIC DISCHARGE SUMMARY       Deann Shah,  1950, MRN [id number]    Admission Date: 10/5/2023      Admission Diagnoses: Osteoarthritis of right hip [M16.11]  Primary osteoarthritis of hip [M16.10]     Discharge Date:  10/06/23     Post-operative Day:  1 Day Post-Op    Reason for Admission: The patient was admitted for the following: Procedure(s):  RIGHT TOTAL HIP ARTHROPLASTY    BRIEF HOSPITAL COURSE   Deann Shah is a pleasant 73 year old female who underwent the aforementioned procedure with Dr. Wu on 10/5. There were no intraoperative complications and the patient was transferred to the recovery room and later the orthopedic unit in stable condition. Once the patient reached the orthopedic floor our orthopedic pain protocol was implemented along with the following:    Anticoagulation Medications: ASA  Therapy: PT and OT  Activity: WBAT  Bracing: None    Consultations during Admission: Hospitalist service for medical management     COMPLICATIONS/SIGNIFICANT FINDINGS    NONE    DISCHARGE INFORMATION   Condition at discharge: Good  Discharge destination: Home  Patient was seen by myself on the date of discharge.    FOLLOW UP CARE   Follow up with orthopedics in 2 weeks or sooner should the need arise. Ortho will continue to manage pain control, post op anticoagulation and incision care.     Follow up with your PCP for management of chronic medical problems and to evaluate for post op medical complications including constipation, nausea/vomiting, DVT/PE, anemia, changes in blood pressure, fevers/chills, urinary retention and atelectasis/pneumonia.     Subjective   Patient is doing well on POD #1. Pain is well controlled with oral medications. Ambulating. Tolerating oral intake.     Physical Exam   BP (!) 144/67 (BP Location: Right arm, Patient Position: Sitting, Cuff Size: Adult Regular)   Pulse 71   Temp 98.5  F (36.9  C) (Oral)   Resp 16   Ht 1.549 m (5' 1\")   Wt 78.5 kg (173 lb)   LMP  " (LMP Unknown)   SpO2 98%   BMI 32.69 kg/m    The patient is A&Ox3. Appears comfortable, sitting up at bedside.  Calves are soft and non-tender bilaterally  Brisk capillary refill in the toes.   Palpable right dorsalis pedis pulse. Foot warm & well-perfused.  Sensation is intact to light touch & equal bilaterally in the femoral, DP, SP & tibial nerve distributions.  ROM: Flexes at right hip. Appropriately flexes & extends all toes bilaterally.   Motor: +5/5 dorsiflexion, plantar flexion & EHL bilaterally. Fires quad.   Leg lengths equal.  right hip dressing C/D/I without strikethrough, no surrounding erythema.         5/5 TA/GSC/EHL.        Pertinent Results at Discharge     Hemoglobin   Date/Time Value Ref Range Status   10/06/2023 07:57 AM 10.8 (L) 11.7 - 15.7 g/dL Final   09/14/2023 03:31 PM 13.2 11.7 - 15.7 g/dL Final   07/19/2023 03:02 PM 12.6 11.7 - 15.7 g/dL Final     Platelet Count   Date/Time Value Ref Range Status   09/14/2023 03:31  (H) 150 - 450 10e3/uL Final   07/19/2023 03:02  150 - 450 10e3/uL Final   02/09/2022 12:25  140 - 400 Final     Comment:     Lab test performed by:  Lab Mnemonic:CB  [facility] [id number]  Ciro Wellington M.D.  Unit of Measure:   Thousand/uL  QUEST Specimen received date and time: 10-FEB-2022 09:22:00.00         Problem List   Principal Problem:    Primary osteoarthritis of hip      Gege Schwarz PA-C/Dr. Wu  [facility]  [phone]  Date: 10/6/2023  Time: 10:36 AM

## 2023-10-06 NOTE — PROGRESS NOTES
Patient discharged with  friend to transport to home.  PIV access removed prior to discharge. Belongings remain with pt at discharge. AVS reviewed with pt, questions answered, education complete.

## 2023-10-06 NOTE — PROGRESS NOTES
Occupational Therapy Discharge Summary    Reason for therapy discharge:    Discharged to home.    Progress towards therapy goal(s). See goals on Care Plan in [facility] electronic health record for goal details.  Goals not met.  Barriers to achieving goals:   discharge from facility.    Therapy recommendation(s):    No further therapy is recommended.

## 2023-10-06 NOTE — PLAN OF CARE
Patient vital signs are at baseline: Yes  Patient able to ambulate as they were prior to admission or with assist devices provided by therapies during their stay:  Yes  Patient MUST void prior to discharge:  Yes  Patient able to tolerate oral intake:  Yes  Pain has adequate pain control using Oral analgesics:  Yes  Does patient have an identified :  Yes  Has goal D/C date and time been discussed with patient:  Yes    Pt is A&Ox4, VSS on RA. A1 with cane and gait belt when ambulating. Voiding adequately. Dressing is CDI. CMS intact. Pt is reporting moderate pain during shift. Utilized scheduled and PRN medications along with non-pharm therapies for pain relief.

## 2023-10-06 NOTE — PROGRESS NOTES
"Orthopedic Progress Note      Assessment: 1 Day Post-Op  S/P Procedure(s):  RIGHT TOTAL HIP ARTHROPLASTY @      Plan:   - Continue PT/OT.   - Weightbearing status: WBAT  - Anticoagulation: ASA in addition to SCDs, kenn stockings and early ambulation.  - Discharge planning: Discharge to home today.     Subjective:  Pain: Well controlled on oral meds  Nausea, Vomiting:  No  Chest pain: No  Lightheadedness, Dizziness:  No  Neuro:  Patient denies new onset numbness or paresthesias     Patient doing well on POD #1. Ambulating, tolerating oral intake, voiding & pain is controlled with oral medications. Hgb 10.8 (13.2 pre-op).     Objective:  BP (!) 144/67 (BP Location: Right arm, Patient Position: Sitting, Cuff Size: Adult Regular)   Pulse 71   Temp 98.5  F (36.9  C) (Oral)   Resp 16   Ht 1.549 m (5' 1\")   Wt 78.5 kg (173 lb)   LMP  (LMP Unknown)   SpO2 98%   BMI 32.69 kg/m    The patient is A&Ox3. Appears comfortable, sitting up at bedside.  Calves are soft and non-tender bilaterally  Brisk capillary refill in the toes.   Palpable right dorsalis pedis pulse. Foot warm & well-perfused.  Sensation is intact to light touch & equal bilaterally in the femoral, DP, SP & tibial nerve distributions.  ROM: Flexes at right hip. Appropriately flexes & extends all toes bilaterally.   Motor: +5/5 dorsiflexion, plantar flexion & EHL bilaterally. Fires quad.   Leg lengths equal.  right hip dressing C/D/I without strikethrough, no surrounding erythema.       5/5 TA/GSC/EHL.         Pertinent Labs   Lab Results: personally reviewed.   No results found for: INR, PROTIME  Lab Results   Component Value Date    WBC 8.8 09/14/2023    HGB 10.8 (L) 10/06/2023    HCT 38.9 09/14/2023    MCV 89 09/14/2023     (H) 09/14/2023     Lab Results   Component Value Date     (L) 09/20/2023    CO2 22 09/20/2023         Report completed by:  Gege Schwarz PA-C/Dr. Wu  [facility]    Date: 10/6/2023  Time: 10:33 AM    "

## 2023-10-06 NOTE — PROGRESS NOTES
"[facility]    Medicine Progress Note - Hospitalist Service    Date of Admission:  10/5/2023    Assessment & Plan   Deann Shah is a 73 year old female admitted on 10/5/2023.  She has a past medical history significant for hypertension, GERD, obesity, osteoarthritis who presented to the hospital for elective right hip arthroplasty.  Hospitalist medicine consulted for management of chronic medical conditions.  Recovering well.  No concerns for plans for discharge on 10/6.    Hypertension  -At home on amlodipine 2.5 mg daily, losartan 100 mg daily  -Blood pressures currently around 140/70    Plan  -Continue home antihypertensives on discharge    GERD    Plan  -Resume home omeprazole    Obesity  -At home on semaglutide    Plan  -Continue home semaglutide on discharge           Diet: Advance Diet as Tolerated: Regular Diet Adult  Discharge Instruction - Regular Diet Adult    DVT Prophylaxis: Defer to primary service  Boyce Catheter: Not present  Lines: None     Cardiac Monitoring: None  Code Status: Full Code      Clinically Significant Risk Factors Present on Admission                  # Hypertension: Noted on problem list      # Obesity: Estimated body mass index is 32.69 kg/m  as calculated from the following:    Height as of this encounter: 1.549 m (5' 1\").    Weight as of this encounter: 78.5 kg (173 lb).              Disposition Plan  Home     Expected Discharge Date: 10/06/2023,  9:00 AM  Discharge Delays: *Early Discharge Anticipated              LANE JEFFREY MD  Hospitalist Service  [facility]  Securely message with [email or web address] (more info)  Text page via [employer] Paging/Directory   ______________________________________________________________________    Interval History   No significant events.  Feeling well.  Denies any chest pain or shortness of breath.  Denies any lightheadedness.  Reported some pain around surgical site.    Physical Exam   Vital Signs: Temp: " 98.5  F (36.9  C) Temp src: Oral BP: (!) 144/67 Pulse: 71   Resp: 16 SpO2: 98 % O2 Device: None (Room air) Oxygen Delivery: 1 LPM  Weight: 173 lbs 0 oz    Physical Exam  Constitutional:       General: She is not in acute distress.     Appearance: She is not toxic-appearing.   Cardiovascular:      Rate and Rhythm: Normal rate.   Pulmonary:      Effort: Pulmonary effort is normal. No respiratory distress.      Breath sounds: No wheezing.   Skin:     General: Skin is warm and dry.   Neurological:      Mental Status: She is alert.          Medical Decision Making       40 MINUTES SPENT BY ME on the date of service doing chart review, history, exam, documentation & further activities per the note.      Data     I have personally reviewed the following data over the past 24 hrs:    N/A  \   10.8 (L)   / N/A     N/A N/A N/A /  113 (H)   N/A N/A N/A \       Imaging results reviewed over the past 24 hrs:   No results found for this or any previous visit (from the past 24 hour(s)).

## 2023-10-06 NOTE — PLAN OF CARE
Problem: Plan of Care - These are the overarching goals to be used throughout the patient stay.    Goal: Absence of Hospital-Acquired Illness or Injury  Intervention: Identify and Manage Fall Risk  Recent Flowsheet Documentation  Taken 10/6/2023 1014 by China Coreas RN  Safety Promotion/Fall Prevention:   safety round/check completed   room organization consistent   room near nurse's station   room door open   patient and family education   nonskid shoes/slippers when out of bed   activity supervised  Intervention: Prevent Skin Injury  Recent Flowsheet Documentation  Taken 10/6/2023 1014 by China Coreas RN  Body Position: position changed independently  Intervention: Prevent and Manage VTE (Venous Thromboembolism) Risk  Recent Flowsheet Documentation  Taken 10/6/2023 1014 by China Coreas RN  VTE Prevention/Management: SCDs (sequential compression devices) off     Problem: Pain Acute  Goal: Optimal Pain Control and Function  Intervention: Prevent or Manage Pain  Recent Flowsheet Documentation  Taken 10/6/2023 1014 by China Coreas RN  Medication Review/Management: medications reviewed     Problem: Hip Arthroplasty  Goal: Optimal Coping  Outcome: Progressing  Goal: Absence of Bleeding  Outcome: Progressing  Goal: Effective Bowel Elimination  Outcome: Progressing  Goal: Fluid and Electrolyte Balance  Outcome: Progressing  Goal: Optimal Functional Ability  Outcome: Progressing  Goal: Absence of Infection Signs and Symptoms  Outcome: Progressing  Goal: Intact Neurovascular Status  Outcome: Progressing  Goal: Anesthesia/Sedation Recovery  Outcome: Progressing  Goal: Acceptable Pain Control  Outcome: Progressing  Goal: Nausea and Vomiting Relief  Outcome: Progressing  Goal: Effective Urinary Elimination  Outcome: Progressing  Goal: Effective Oxygenation and Ventilation  Outcome: Progressing   Goal Outcome Evaluation:  BP slightly elevated but did not meet parameters to notify the provider.  VSS.  A&Ox4.  Pain rated 5-8/10, PRN oral  oxycodone and atarax provided with relief. CMS intact.  Dressing CDI.  Pt ambulating well, assist of 1 with gait belt and walker.  Chair alarm in place for safety.  PIV removed.

## 2023-10-06 NOTE — PROGRESS NOTES
10/06/23 0730   Appointment Info   Signing Clinician's Name / Credentials (OT) Evette Tiwari OTR/L   Quick Adds   Quick Adds Certification   Living Environment   People in Home alone   Current Living Arrangements house   Living Environment Comments Deciding between going home alone or going to stay with a friend. Friend's home is single story, walk-in shower, 1 step to enter. Pt's home has tub/shower, 1 step to enter. Pt has a tub seat at home, no grab bars. Has a FWW, cane.   Self-Care   Usual Activity Tolerance moderate   Current Activity Tolerance moderate   Equipment Currently Used at Home raised toilet seat;tub bench   Fall history within last six months no   Activity/Exercise/Self-Care Comment Independent with ADL, but due to hip and back pain she was not going out except for appointments. She was not wearing shoes or socks because she wasn't going out.   General Information   Onset of Illness/Injury or Date of Surgery 10/05/23   Referring Physician Scott Wu   Patient/Family Therapy Goal Statement (OT) Go home today   Additional Occupational Profile Info/Pertinent History of Current Problem Deann Shah is a 73 year old female admitted on 10/5/2023.  She has a past medical history significant for hypertension, GERD, obesity, osteoarthritis who presented to the hospital for elective right hip arthroplasty.  Hospitalist medicine consulted for management of chronic medical conditions. Pt has a L below elbow amputation.   Existing Precautions/Restrictions weight bearing   Cognitive Status Examination   Orientation Status orientation to person, place and time   Bed Mobility   Assistive Device (Bed Mobility) bed rails   Comment (Bed Mobility) Minimal assistance to lift R leg into and out of bed. Able to stand from bed without assistance.   Transfers   Transfers bed-chair transfer;toilet transfer;shower transfer   Transfer Comments SBA using cane for sit to stand, stand to sit into and out of recliner. SBA  toilet transfer; difficulty walking in and out of bathroom. Unable to transfer leg over tub.   Activities of Daily Living   BADL Assessment/Intervention lower body dressing;bathing   Bathing Assessment/Intervention   Hibbs Level (Bathing) unable to perform   Lower Body Dressing Assessment/Training   Hibbs Level (Lower Body Dressing) minimum assist (75% patient effort)   Clinical Impression   Criteria for Skilled Therapeutic Interventions Met (OT) Yes, treatment indicated   OT Diagnosis Decreased indep with ADL   Influenced by the following impairments R NICKY   OT Problem List-Impairments impacting ADL activity tolerance impaired;mobility;pain   Assessment of Occupational Performance 3-5 Performance Deficits   Identified Performance Deficits LB dressing, bed mobility, bathing/tub transfer   Planned Therapy Interventions (OT) ADL retraining;bed mobility training   Clinical Decision Making Complexity (OT) moderate complexity   Risk & Benefits of therapy have been explained evaluation/treatment results reviewed   OT Total Evaluation Time   OT Eval, Moderate Complexity Minutes (43591) 10   Therapy Certification   Medical Diagnosis R NICKY   Start of Care Date 10/06/23   Certification date from 10/06/23   Certification date to 11/05/23   OT Goals   Therapy Frequency (OT) 2 times/day   OT Predicted Duration/Target Date for Goal Attainment 10/06/23   OT Goals Bed Mobility;Transfers   OT: Bed Mobility Modified independent   OT: Transfer Minimal assist  (Tub or shower  transfer)   Interventions   Interventions Quick Adds Self-Care/Home Management   Self-Care/Home Management   Self-Care/Home Mgmt/ADL, Compensatory, Meal Prep Minutes (52964) 45   Symptoms Noted During/After Treatment (Meal Preparation/Planning Training) increased pain   Treatment Detail/Skilled Intervention Educated on LB dressing technique for donning pants; max assist initially then only minimal assist for socks/R shoes. Discussed AE options:  reacher, sock aide (decliend both). Pt has a shoe horn; used to don L shoe. Educated on bed mobility technique; declined leg  at eval, so requried minimal assistance with R LE. Impaired mobility in the room with the cane; slow and painful. Unable to lift R leg enough to attempt transfer in/out of tub or shower this morning.   OT Discharge Planning   OT Plan Bed mobility, tub or shower transfer   OT Discharge Recommendation (DC Rec)   (Defer to ortho)   OT Rationale for DC Rec Pt currently is having diffiuclty elevating her R LE in and out of bed, and over thresh holds. She would like to dc home alone, but currently needs assistance with bed mobility and was not able to attempt shower or tub transfer due to pain and immobility.   OT Brief overview of current status LB dressing, UB dressing ,grooming at sink, bed mobility, chair transfer.   Total Session Time   Timed Code Treatment Minutes 45   Total Session Time (sum of timed and untimed services) 55      [facility]  OUTPATIENT OCCUPATIONAL THERAPY  EVALUATION  PLAN OF TREATMENT FOR OUTPATIENT REHABILITATION  (COMPLETE FOR INITIAL CLAIMS ONLY)  Patient's Last Name, First Name, M.I.  YOB: 1950  Deann Shah                          Provider's Name  [facility] Medical Record No.  [id number]                             Onset Date:  10/05/23   Start of Care Date:  10/06/23   Type:     ___PT   _X_OT   ___SLP Medical Diagnosis:  R NICKY                    OT Diagnosis:  Decreased indep with ADL Visits from SOC:  1     See note for plan of treatment, functional goals and certification details    I CERTIFY THE NEED FOR THESE SERVICES FURNISHED UNDER        THIS PLAN OF TREATMENT AND WHILE UNDER MY CARE     (Physician co-signature of this document indicates review and certification of the therapy plan).

## 2023-10-18 ENCOUNTER — TELEPHONE (OUTPATIENT)
Dept: FAMILY MEDICINE | Facility: CLINIC | Age: 73
End: 2023-10-18
Payer: MEDICARE

## 2023-10-18 ENCOUNTER — MEDICAL CORRESPONDENCE (OUTPATIENT)
Dept: HEALTH INFORMATION MANAGEMENT | Facility: CLINIC | Age: 73
End: 2023-10-18
Payer: MEDICARE

## 2023-10-18 NOTE — TELEPHONE ENCOUNTER
Medication Question or Refill  Contacts         Type Contact Phone/Fax    10/18/2023 09:48 AM CDT Phone (Incoming) Deann Shah (Self) [phone] (M)          What medication are you calling about?: Patient is calling and asking as to whether or not she should resume taking her BP medication, as she had not been taking it due to her hip replacement.  She would like someone to call her to follow up because she's currently unable to access [employer].    Who prescribed the medication?: KWL    Do you need a refill? No    When did you use the medication last? Before her hip replacement    Could we send this information to you in [employer] or would you prefer to receive a phone call?:   Patient would prefer a phone call     Okay to leave a detailed message?: Yes at Cell number on file:    Telephone Information:   Mobile [phone]

## 2023-10-18 NOTE — TELEPHONE ENCOUNTER
I called and spoke with patient, after reviewing patient's chart with patient. We came to the following conclusion;    Patient was told to discontinue that hydrochlorothiazide due to her lab results 9/14 which was Sodium was elevated. Was told via telephone to discontinue medication and repeat lab work in 1 week. Patient had lab work completed again 09/20 which was improved, and was cleared to continue with surgery.No documentation was noted for patient to continue/restart hydrochlorothiazide. Patient verbalized understanding of this information.     Patient then stated that she is feeling dizzy in her head still, and is wondering what to do next?    Encounter routed back to PCP for review.

## 2023-10-18 NOTE — TELEPHONE ENCOUNTER
Yes she should be taking her medications as usual.  Please check to see that she does not have any specific reason but by our records her blood pressure was slightly high and she should continue on blood pressure medication.

## 2023-10-18 NOTE — TELEPHONE ENCOUNTER
Thank you for the clarification. I do not want her to go back on the hydrochlorothiazide.  I would like for her to get her blood pressure checked as dizziness is more likely related to low blood pressure than high blood pressure.

## 2023-10-18 NOTE — TELEPHONE ENCOUNTER
I called and spoke with patient relaying documentation. Patient verbalized understanding.     Patient states she is still not cleared for driving so would not be able to come in for a blood pressure check, patient states she has an appointment with [facility] on Monday 10/23/2023 if that would work for a recheck.     I verbally discussed this with Dr. Ramirez, who states that Monday is fine, if patient's symptoms get worse than she needs to call us. Patient verbalized understanding, no further questions/concerns at this time.    Encounter closed.

## 2023-10-23 ENCOUNTER — ALLIED HEALTH/NURSE VISIT (OUTPATIENT)
Dept: FAMILY MEDICINE | Facility: CLINIC | Age: 73
End: 2023-10-23
Payer: MEDICARE

## 2023-10-23 ENCOUNTER — ANCILLARY PROCEDURE (OUTPATIENT)
Dept: GENERAL RADIOLOGY | Facility: CLINIC | Age: 73
End: 2023-10-23
Attending: STUDENT IN AN ORGANIZED HEALTH CARE EDUCATION/TRAINING PROGRAM
Payer: MEDICARE

## 2023-10-23 VITALS — SYSTOLIC BLOOD PRESSURE: 129 MMHG | HEART RATE: 64 BPM | DIASTOLIC BLOOD PRESSURE: 78 MMHG

## 2023-10-23 DIAGNOSIS — Z96.641 STATUS POST RIGHT HIP REPLACEMENT: ICD-10-CM

## 2023-10-23 DIAGNOSIS — Z01.30 BLOOD PRESSURE CHECK: Primary | ICD-10-CM

## 2023-10-23 PROCEDURE — 99207 PR NO CHARGE NURSE ONLY: CPT

## 2023-10-23 NOTE — PROGRESS NOTES
I met with Deann Shah at the request of ROSA M AGUIRRE MD  to recheck her blood pressure.  Blood pressure medications on the med list were reviewed with patient.    Patient has taken all medications as per usual regimen: BP stopped before surgery 1 week ago  Patient reports tolerating them without any issues or concerns: Yes    Vitals:    10/23/23 1033   BP: 129/78   Pulse: 64       Blood pressure was taken, previous encounter was reviewed, recorded blood pressure below 140/90.  Patient was discharged and the note will be sent to the provider for final review.

## 2024-02-08 DIAGNOSIS — Z79.2 PROPHYLACTIC ANTIBIOTIC: Primary | ICD-10-CM

## 2024-02-09 RX ORDER — AMOXICILLIN 500 MG/1
2000 CAPSULE ORAL 3 TIMES DAILY
Qty: 30 CAPSULE | Refills: 1 | Status: SHIPPED | OUTPATIENT
Start: 2024-02-09 | End: 2024-02-09

## 2024-02-09 RX ORDER — AMOXICILLIN 500 MG/1
2000 CAPSULE ORAL
Qty: 16 CAPSULE | Refills: 1 | Status: SHIPPED | OUTPATIENT
Start: 2024-02-09

## 2024-02-11 DIAGNOSIS — I10 HTN (HYPERTENSION): ICD-10-CM

## 2024-02-12 RX ORDER — LOSARTAN POTASSIUM 100 MG/1
TABLET ORAL
Qty: 90 TABLET | Refills: 2 | Status: SHIPPED | OUTPATIENT
Start: 2024-02-12

## 2024-04-17 ENCOUNTER — OFFICE VISIT (OUTPATIENT)
Dept: FAMILY MEDICINE | Facility: CLINIC | Age: 74
End: 2024-04-17
Payer: MEDICARE

## 2024-04-17 VITALS
BODY MASS INDEX: 29.91 KG/M2 | DIASTOLIC BLOOD PRESSURE: 60 MMHG | SYSTOLIC BLOOD PRESSURE: 124 MMHG | HEIGHT: 61 IN | RESPIRATION RATE: 14 BRPM | OXYGEN SATURATION: 98 % | TEMPERATURE: 96.4 F | HEART RATE: 86 BPM | WEIGHT: 158.4 LBS

## 2024-04-17 DIAGNOSIS — K21.9 CHRONIC GERD: ICD-10-CM

## 2024-04-17 DIAGNOSIS — I10 PRIMARY HYPERTENSION: ICD-10-CM

## 2024-04-17 DIAGNOSIS — F32.1 MAJOR DEPRESSIVE DISORDER, SINGLE EPISODE, MODERATE (H): Primary | ICD-10-CM

## 2024-04-17 DIAGNOSIS — R53.83 OTHER FATIGUE: ICD-10-CM

## 2024-04-17 DIAGNOSIS — F41.1 GAD (GENERALIZED ANXIETY DISORDER): ICD-10-CM

## 2024-04-17 DIAGNOSIS — R41.3 MEMORY CHANGE: ICD-10-CM

## 2024-04-17 DIAGNOSIS — J18.9 ATYPICAL PNEUMONIA: ICD-10-CM

## 2024-04-17 PROBLEM — E66.01 CLASS 2 SEVERE OBESITY DUE TO EXCESS CALORIES WITH SERIOUS COMORBIDITY IN ADULT (H): Status: RESOLVED | Noted: 2023-07-19 | Resolved: 2024-04-17

## 2024-04-17 PROBLEM — E66.812 CLASS 2 SEVERE OBESITY DUE TO EXCESS CALORIES WITH SERIOUS COMORBIDITY IN ADULT (H): Status: RESOLVED | Noted: 2023-07-19 | Resolved: 2024-04-17

## 2024-04-17 PROBLEM — M25.551 RIGHT HIP PAIN: Status: RESOLVED | Noted: 2023-03-18 | Resolved: 2024-04-17

## 2024-04-17 LAB
BASOPHILS # BLD AUTO: 0 10E3/UL (ref 0–0.2)
BASOPHILS NFR BLD AUTO: 0 %
EOSINOPHIL # BLD AUTO: 0.1 10E3/UL (ref 0–0.7)
EOSINOPHIL NFR BLD AUTO: 1 %
ERYTHROCYTE [DISTWIDTH] IN BLOOD BY AUTOMATED COUNT: 14 % (ref 10–15)
HCT VFR BLD AUTO: 40.3 % (ref 35–47)
HGB BLD-MCNC: 13.1 G/DL (ref 11.7–15.7)
IMM GRANULOCYTES # BLD: 0 10E3/UL
IMM GRANULOCYTES NFR BLD: 0 %
LYMPHOCYTES # BLD AUTO: 2.3 10E3/UL (ref 0.8–5.3)
LYMPHOCYTES NFR BLD AUTO: 31 %
MCH RBC QN AUTO: 31 PG (ref 26.5–33)
MCHC RBC AUTO-ENTMCNC: 32.5 G/DL (ref 31.5–36.5)
MCV RBC AUTO: 95 FL (ref 78–100)
MONOCYTES # BLD AUTO: 0.5 10E3/UL (ref 0–1.3)
MONOCYTES NFR BLD AUTO: 7 %
NEUTROPHILS # BLD AUTO: 4.5 10E3/UL (ref 1.6–8.3)
NEUTROPHILS NFR BLD AUTO: 61 %
PLATELET # BLD AUTO: 410 10E3/UL (ref 150–450)
RBC # BLD AUTO: 4.23 10E6/UL (ref 3.8–5.2)
WBC # BLD AUTO: 7.3 10E3/UL (ref 4–11)

## 2024-04-17 PROCEDURE — 36415 COLL VENOUS BLD VENIPUNCTURE: CPT | Performed by: FAMILY MEDICINE

## 2024-04-17 PROCEDURE — 82607 VITAMIN B-12: CPT | Performed by: FAMILY MEDICINE

## 2024-04-17 PROCEDURE — 85025 COMPLETE CBC W/AUTO DIFF WBC: CPT | Mod: QW | Performed by: FAMILY MEDICINE

## 2024-04-17 PROCEDURE — 84443 ASSAY THYROID STIM HORMONE: CPT | Performed by: FAMILY MEDICINE

## 2024-04-17 PROCEDURE — 80053 COMPREHEN METABOLIC PANEL: CPT | Performed by: FAMILY MEDICINE

## 2024-04-17 PROCEDURE — 99214 OFFICE O/P EST MOD 30 MIN: CPT | Performed by: FAMILY MEDICINE

## 2024-04-17 RX ORDER — ESCITALOPRAM OXALATE 20 MG/1
20 TABLET ORAL DAILY
Qty: 90 TABLET | Refills: 4 | Status: SHIPPED | OUTPATIENT
Start: 2024-04-17

## 2024-04-17 RX ORDER — AMLODIPINE BESYLATE 2.5 MG/1
2.5 TABLET ORAL DAILY
Qty: 90 TABLET | Refills: 4 | Status: SHIPPED | OUTPATIENT
Start: 2024-04-17 | End: 2024-06-06

## 2024-04-17 RX ORDER — AZITHROMYCIN 250 MG/1
TABLET, FILM COATED ORAL
Qty: 6 TABLET | Refills: 0 | Status: SHIPPED | OUTPATIENT
Start: 2024-04-17 | End: 2024-04-22

## 2024-04-17 ASSESSMENT — PATIENT HEALTH QUESTIONNAIRE - PHQ9
SUM OF ALL RESPONSES TO PHQ QUESTIONS 1-9: 23
10. IF YOU CHECKED OFF ANY PROBLEMS, HOW DIFFICULT HAVE THESE PROBLEMS MADE IT FOR YOU TO DO YOUR WORK, TAKE CARE OF THINGS AT HOME, OR GET ALONG WITH OTHER PEOPLE: EXTREMELY DIFFICULT
SUM OF ALL RESPONSES TO PHQ QUESTIONS 1-9: 23

## 2024-04-17 ASSESSMENT — ENCOUNTER SYMPTOMS: DIZZINESS: 1

## 2024-04-17 NOTE — PROGRESS NOTES
Assessment & Plan     Major depressive disorder, single episode, moderate (H)  History of anxiety, depression is not common for patient, declines counseling but encouraged her to think about it as there seems to be significant contributing factors that she could use help processing, will increase dose of medication, recheck in 4 weeks if not improving  - escitalopram (LEXAPRO) 20 MG tablet; Take 1 tablet (20 mg) by mouth daily    Atypical pneumonia  Cough not improving, will treat with zpack, follow up if not resolving  - azithromycin (ZITHROMAX) 250 MG tablet; Take 2 tablets (500 mg) by mouth daily for 1 day, THEN 1 tablet (250 mg) daily for 4 days.    Memory change  Suspect may be related to depression but will check for other causes  - TSH with free T4 reflex; Future  - Vitamin B12; Future  - CBC with platelets and differential; Future  - Comprehensive metabolic panel (BMP + Alb, Alk Phos, ALT, AST, Total. Bili, TP); Future  - TSH with free T4 reflex  - Vitamin B12  - CBC with platelets and differential  - Comprehensive metabolic panel (BMP + Alb, Alk Phos, ALT, AST, Total. Bili, TP)    Other fatigue  Labs pending  - TSH with free T4 reflex; Future  - Vitamin B12; Future  - CBC with platelets and differential; Future  - Comprehensive metabolic panel (BMP + Alb, Alk Phos, ALT, AST, Total. Bili, TP); Future  - TSH with free T4 reflex  - Vitamin B12  - CBC with platelets and differential  - Comprehensive metabolic panel (BMP + Alb, Alk Phos, ALT, AST, Total. Bili, TP)    Chronic GERD  Stable on current regimen  - omeprazole (PRILOSEC) 20 MG DR capsule; Take 1 capsule (20 mg) by mouth daily    Primary hypertension  BP stable, continue current regimen  - amLODIPine (NORVASC) 2.5 MG tablet; Take 1 tablet (2.5 mg) by mouth daily    MIGUELITO (generalized anxiety disorder)  No increase in anxiety, medication increased to address depression  - escitalopram (LEXAPRO) 20 MG tablet; Take 1 tablet (20 mg) by mouth  "daily            BMI  Estimated body mass index is 29.93 kg/m  as calculated from the following:    Height as of this encounter: 1.549 m (5' 1\").    Weight as of this encounter: 71.8 kg (158 lb 6.4 oz).       Depression Screening Follow Up        4/17/2024     5:13 PM   PHQ   PHQ-9 Total Score 23   Q9: Thoughts of better off dead/self-harm past 2 weeks Not at all           Follow Up Actions Taken  Crisis resource information provided in After Visit Summary  Patient declined referral.  Adjusted patient's anti-depressant medication.           Subjective   Deann is a 73 year old, presenting for the following health issues:  URI (Since December- went away when she was in [place], came back after she was here for a few days. ), Dizziness, Mass (Left Buttock - ), and Diarrhea        4/17/2024     5:18 PM   Additional Questions   Roomed by Li TOWNSEND CMA   Accompanied by None     Cough since March 7th, dry and hacking, no chest pain  Depression discussed, has had more stress, has been isolating more, sleeping frequently, denies suicidal ideation  Dizziness, worse when she first gets up in the morning, feels like balance is off, has always had some symptoms, also noting some concerns with memory, unclear if it is related to depression  Diarrhea, has been going on intermittently for at least a year, no particular trigger  Lumps in buttocks, newly discovered in shower, not tender    History of Present Illness       Reason for visit:  Congested,cough,dizziness,ankles swelling,diarrhea,ears plug up,food not tasty,  Symptom onset:  More than a month  Symptom intensity:  Severe  Symptom progression:  Staying the same  Had these symptoms before:  No  What makes it worse:  Na  What makes it better:  Sleep    She eats 2-3 servings of fruits and vegetables daily.She consumes 1 sweetened beverage(s) daily.She exercises with enough effort to increase her heart rate 9 or less minutes per day.  She exercises with enough effort to increase " "her heart rate 3 or less days per week.   She is taking medications regularly.                 Objective    /60   Pulse 86   Temp (!) 96.4  F (35.8  C)   Resp 14   Ht 1.549 m (5' 1\")   Wt 71.8 kg (158 lb 6.4 oz)   LMP  (LMP Unknown)   SpO2 98%   BMI 29.93 kg/m    Body mass index is 29.93 kg/m .  Physical Exam   GENERAL: alert and no distress, tearful discussing family stressors  NECK: no adenopathy, no asymmetry, masses, or scars  RESP: good air movement, scattered rhonchi diffusely  CV: regular rate and rhythm, normal S1 S2, no S3 or S4, no murmur, click or rub, no peripheral edema  ABDOMEN: soft, nontender, no hepatosplenomegaly, no masses and bowel sounds normal  MS: no gross musculoskeletal defects noted, no edema            Signed Electronically by: Blossom Ramirez MD    "

## 2024-04-19 DIAGNOSIS — E53.8 VITAMIN B12 DEFICIENCY (NON ANEMIC): Primary | ICD-10-CM

## 2024-04-19 LAB
ALBUMIN SERPL BCG-MCNC: 3.6 G/DL (ref 3.5–5.2)
ALP SERPL-CCNC: 77 U/L (ref 40–150)
ALT SERPL W P-5'-P-CCNC: <5 U/L (ref 0–50)
ANION GAP SERPL CALCULATED.3IONS-SCNC: 11 MMOL/L (ref 7–15)
AST SERPL W P-5'-P-CCNC: 18 U/L (ref 0–45)
BILIRUB SERPL-MCNC: 0.4 MG/DL
BUN SERPL-MCNC: 13.4 MG/DL (ref 8–23)
CALCIUM SERPL-MCNC: 9.2 MG/DL (ref 8.8–10.2)
CHLORIDE SERPL-SCNC: 104 MMOL/L (ref 98–107)
CREAT SERPL-MCNC: 1 MG/DL (ref 0.51–0.95)
DEPRECATED HCO3 PLAS-SCNC: 24 MMOL/L (ref 22–29)
EGFRCR SERPLBLD CKD-EPI 2021: 59 ML/MIN/1.73M2
GLUCOSE SERPL-MCNC: 104 MG/DL (ref 70–99)
POTASSIUM SERPL-SCNC: 3.9 MMOL/L (ref 3.4–5.3)
PROT SERPL-MCNC: 6.6 G/DL (ref 6.4–8.3)
SODIUM SERPL-SCNC: 139 MMOL/L (ref 135–145)
TSH SERPL DL<=0.005 MIU/L-ACNC: 2.06 UIU/ML (ref 0.3–4.2)
VIT B12 SERPL-MCNC: 164 PG/ML (ref 232–1245)

## 2024-05-17 ENCOUNTER — TELEPHONE (OUTPATIENT)
Dept: FAMILY MEDICINE | Facility: CLINIC | Age: 74
End: 2024-05-17
Payer: MEDICARE

## 2024-05-17 NOTE — TELEPHONE ENCOUNTER
Prior Authorization Request   Who s requesting:  Pharmacy  Pharmacy Name and Location: [facility]  Medication Name: Ozempic  Insurance Plan: Medicare  Key: None      Dr. Ramirez, I do not see this medication on the patient's list. Was it discontinued? Do you want us to start a PA? If so, please order prescription so this is available to the PA team.  Chetna Goss CMA ............... 3:22 PM, 05/17/24

## 2024-05-20 NOTE — TELEPHONE ENCOUNTER
Call with pt to discuss Ozempic. Pt states she is not taking Ozempic and wants to restart due to weight gain. OV advised; pt in agreement and visit was scheduled.

## 2024-05-20 NOTE — TELEPHONE ENCOUNTER
Please call patient. I don't see that she has been taking this recently. Was prescribed last year at 0.25mg.

## 2024-05-30 ENCOUNTER — OFFICE VISIT (OUTPATIENT)
Dept: FAMILY MEDICINE | Facility: CLINIC | Age: 74
End: 2024-05-30
Payer: MEDICARE

## 2024-05-30 VITALS
SYSTOLIC BLOOD PRESSURE: 144 MMHG | HEIGHT: 61 IN | OXYGEN SATURATION: 99 % | TEMPERATURE: 98.5 F | DIASTOLIC BLOOD PRESSURE: 70 MMHG | WEIGHT: 159 LBS | HEART RATE: 69 BPM | BODY MASS INDEX: 30.02 KG/M2 | RESPIRATION RATE: 16 BRPM

## 2024-05-30 DIAGNOSIS — E66.811 CLASS 1 OBESITY WITH BODY MASS INDEX (BMI) OF 30.0 TO 30.9 IN ADULT, UNSPECIFIED OBESITY TYPE, UNSPECIFIED WHETHER SERIOUS COMORBIDITY PRESENT: ICD-10-CM

## 2024-05-30 DIAGNOSIS — M25.511 CHRONIC RIGHT SHOULDER PAIN: Primary | ICD-10-CM

## 2024-05-30 DIAGNOSIS — E53.8 VITAMIN B12 DEFICIENCY (NON ANEMIC): ICD-10-CM

## 2024-05-30 DIAGNOSIS — G89.29 CHRONIC RIGHT SHOULDER PAIN: Primary | ICD-10-CM

## 2024-05-30 PROCEDURE — 82607 VITAMIN B-12: CPT | Performed by: FAMILY MEDICINE

## 2024-05-30 PROCEDURE — 99213 OFFICE O/P EST LOW 20 MIN: CPT | Performed by: FAMILY MEDICINE

## 2024-05-30 PROCEDURE — 36415 COLL VENOUS BLD VENIPUNCTURE: CPT | Performed by: FAMILY MEDICINE

## 2024-05-30 RX ORDER — UBIDECARENONE 75 MG
CAPSULE ORAL
COMMUNITY

## 2024-05-30 RX ORDER — ACETAMINOPHEN 325 MG/1
650 TABLET ORAL EVERY 6 HOURS PRN
Qty: 100 TABLET | Refills: 4 | Status: SHIPPED | OUTPATIENT
Start: 2024-05-30

## 2024-05-30 ASSESSMENT — PATIENT HEALTH QUESTIONNAIRE - PHQ9
SUM OF ALL RESPONSES TO PHQ QUESTIONS 1-9: 12
SUM OF ALL RESPONSES TO PHQ QUESTIONS 1-9: 12

## 2024-05-30 NOTE — PROGRESS NOTES
"  Assessment & Plan     Chronic right shoulder pain  Will resume acetaminophen, can also try tizanidine. Follow up if not improving.  - acetaminophen (TYLENOL) 325 MG tablet; Take 2 tablets (650 mg) by mouth every 6 hours as needed for mild pain    Vitamin B12 deficiency (non anemic)  Will check B12 level today  - Vitamin B12    Class 1 obesity with body mass index (BMI) of 30.0 to 30.9 in adult, unspecified obesity type, unspecified whether serious comorbidity present  Discussed weight with patient. Mildly elevated BMI. Does not require medication. Discussed that weight loss medication not currently covered by insurance. Recommend dietary changes and exercise.          BMI  Estimated body mass index is 30.04 kg/m  as calculated from the following:    Height as of this encounter: 1.549 m (5' 1\").    Weight as of this encounter: 72.1 kg (159 lb).       Depression Screening Follow Up        Follow Up Actions Taken  Patient counseled, no additional follow up at this time.           Brandee Zacarias is a 74 year old, presenting for the following health issues:  Follow Up (Discuss recent B12 levels. Also, discuss weight loss medication)      5/30/2024     2:00 PM   Additional Questions   Roomed by nevaeh   Accompanied by n/a     History of Present Illness       Reason for visit:  Weight loss and checking B12    She eats 2-3 servings of fruits and vegetables daily.She consumes 2 sweetened beverage(s) daily.She exercises with enough effort to increase her heart rate 9 or less minutes per day.  She exercises with enough effort to increase her heart rate 3 or less days per week.   She is taking medications regularly.     Patient with concerns about weight. BMI minimally elevated. History of HTN. Blood pressure has been mildly elevated.   Ongoing right shoulder pain. Not using any medication.   Discussed B12, tolerating oral medication. Due for B12 recheck.                   Objective    BP (!) 144/68 (BP Location: Right " "arm, Patient Position: Sitting)   Pulse 69   Temp 98.5  F (36.9  C) (Tympanic)   Resp 16   Ht 1.549 m (5' 1\")   Wt 72.1 kg (159 lb)   LMP  (LMP Unknown)   SpO2 99%   BMI 30.04 kg/m    Body mass index is 30.04 kg/m .  Physical Exam     Alert, cooperative, no distress  Pain with movement of right shoulder            Signed Electronically by: Blossom Ramirez MD    "

## 2024-05-31 LAB — VIT B12 SERPL-MCNC: 439 PG/ML (ref 232–1245)

## 2024-06-06 ENCOUNTER — TELEPHONE (OUTPATIENT)
Dept: FAMILY MEDICINE | Facility: CLINIC | Age: 74
End: 2024-06-06
Payer: MEDICARE

## 2024-06-06 DIAGNOSIS — I10 PRIMARY HYPERTENSION: ICD-10-CM

## 2024-06-06 RX ORDER — AMLODIPINE BESYLATE 5 MG/1
5 TABLET ORAL DAILY
Qty: 90 TABLET | Refills: 4 | Status: SHIPPED | OUTPATIENT
Start: 2024-06-06 | End: 2024-07-01

## 2024-06-06 NOTE — TELEPHONE ENCOUNTER
Would recommend increasing dose of amlodipine to 5mg and coming for BP check in clinic in 3 weeks with MA. Can double the pills that she has at home.

## 2024-06-06 NOTE — TELEPHONE ENCOUNTER
Fax received from patient's dentist with elevated blood pressure readings (174/86, 176/72, 190/82). Routed to patient's primary provider.

## 2024-06-06 NOTE — TELEPHONE ENCOUNTER
I called and spoke with patient, patient states that she is a little nervous as when she left the dentist her blood pressure was 199 over 100 something. I asked if patient has an at home blood pressure cough, patient denied. I offered scheduling patient with provider, patient also declined.    Routed to patient's primary doctor for further review, as patient would like a medication just sent in.

## 2024-06-07 NOTE — TELEPHONE ENCOUNTER
Spoke with Deann and gave recommendation per Dr. Ramirez. She expressed understanding.  Discussed using 2.5mg tablets at home which would mean she would take 2 tablets and then when she fills the new prescription it would be 1 tablet at those are 5mg.    She expressed understanding and had no further questions at this time.

## 2024-06-19 ENCOUNTER — ALLIED HEALTH/NURSE VISIT (OUTPATIENT)
Dept: FAMILY MEDICINE | Facility: CLINIC | Age: 74
End: 2024-06-19
Payer: MEDICARE

## 2024-06-19 VITALS — SYSTOLIC BLOOD PRESSURE: 151 MMHG | DIASTOLIC BLOOD PRESSURE: 70 MMHG

## 2024-06-19 DIAGNOSIS — Z01.30 BLOOD PRESSURE CHECK: Primary | ICD-10-CM

## 2024-06-19 PROCEDURE — 99207 PR NO CHARGE NURSE ONLY: CPT

## 2024-06-19 NOTE — PROGRESS NOTES
I met with Deann Shah at the request of Dr. Ramirez to recheck her blood pressure.  Blood pressure medications on the med list were reviewed with patient.    Patient has taken all medications as per usual regimen: Yes  Patient reports tolerating them without any issues or concerns: Yes    Vitals:    06/19/24 1520 06/19/24 1528   BP: (!) 170/77 (!) 151/70       After 5 minutes, the patient's blood pressure remained greater than or equal to 140/90.    Is the patient currently having any chest pain? No  Does the patient currently have a headache? No  Does the patient currently have any vision changes? No  Does the patient currently have any nausea? No  Does the patient currently have any abdominal pain? No    I consulted with She Cruz, who states to have patient return in one week for repeat blood pressure check. I also offered the patient to schedule with Dr. Ramirez .    At home readings have been fair; 137/77, 140/71, 115/83, 158/82, 142/77, 153/101, 160/66

## 2024-07-01 ENCOUNTER — TELEPHONE (OUTPATIENT)
Dept: FAMILY MEDICINE | Facility: CLINIC | Age: 74
End: 2024-07-01

## 2024-07-01 ENCOUNTER — ALLIED HEALTH/NURSE VISIT (OUTPATIENT)
Dept: FAMILY MEDICINE | Facility: CLINIC | Age: 74
End: 2024-07-01
Payer: MEDICARE

## 2024-07-01 VITALS — OXYGEN SATURATION: 96 % | DIASTOLIC BLOOD PRESSURE: 77 MMHG | HEART RATE: 62 BPM | SYSTOLIC BLOOD PRESSURE: 134 MMHG

## 2024-07-01 DIAGNOSIS — Z01.30 BLOOD PRESSURE CHECK: Primary | ICD-10-CM

## 2024-07-01 DIAGNOSIS — I10 PRIMARY HYPERTENSION: ICD-10-CM

## 2024-07-01 PROCEDURE — 99207 PR NO CHARGE NURSE ONLY: CPT

## 2024-07-01 RX ORDER — CHLORTHALIDONE 25 MG/1
25 TABLET ORAL DAILY
Qty: 90 TABLET | Refills: 3 | Status: SHIPPED | OUTPATIENT
Start: 2024-07-01

## 2024-07-01 RX ORDER — AMLODIPINE BESYLATE 2.5 MG/1
2.5 TABLET ORAL DAILY
Qty: 90 TABLET | Refills: 3 | Status: SHIPPED | OUTPATIENT
Start: 2024-07-01

## 2024-07-01 NOTE — TELEPHONE ENCOUNTER
I called and spoke with patient informing her that Dr. Ramirez would like for her to check her blood pressure every couple of days during the week and get back to us. Patient verbalized understanding, no further questions/concerns at this time.    Patient then stated that her feet are swelling every night since taking the amlodipine BID, I stated I did not see any documentation and would route to Dr. Ramirez.

## 2024-07-01 NOTE — TELEPHONE ENCOUNTER
Call with pt, informed prescriptions have been sent. Pt expressed appreciation for quick response from provider.

## 2024-07-01 NOTE — TELEPHONE ENCOUNTER
Please call patient.    We could decrease the amlodipine back down to 2.5 mg daily and add low-dose of chlorthalidone to treat the blood pressure along with the amlodipine and losartan.  Also happy to see her if that would be her preference.  If she can tolerate the swelling it is not dangerous but it is a known side effect of amlodipine.

## 2024-07-01 NOTE — TELEPHONE ENCOUNTER
Call with pt and given message from provider. Pt is requesting to decrease Amlodipine to 2.5mg and to begin Chlorthalidone.   Routing reply to PCP.   Pt is in town and is hoping to have prescription sent as soon as possible.

## 2024-07-01 NOTE — PROGRESS NOTES
I met with Deann Shah at the request of YESENIA to recheck her blood pressure.  Blood pressure medications on the med list were reviewed with patient.    Patient has taken all medications as per usual regimen: Yes  Patient reports tolerating them without any issues or concerns: Yes    There were no vitals filed for this visit.    Blood pressure was taken, previous encounter was reviewed, recorded blood pressure below 140/90.  Patient was discharged and the note will be sent to the provider for final review.

## 2024-07-31 SDOH — HEALTH STABILITY: PHYSICAL HEALTH: ON AVERAGE, HOW MANY DAYS PER WEEK DO YOU ENGAGE IN MODERATE TO STRENUOUS EXERCISE (LIKE A BRISK WALK)?: 0 DAYS

## 2024-07-31 SDOH — HEALTH STABILITY: PHYSICAL HEALTH: ON AVERAGE, HOW MANY MINUTES DO YOU ENGAGE IN EXERCISE AT THIS LEVEL?: 0 MIN

## 2024-08-05 ENCOUNTER — OFFICE VISIT (OUTPATIENT)
Dept: FAMILY MEDICINE | Facility: CLINIC | Age: 74
End: 2024-08-05
Attending: FAMILY MEDICINE
Payer: MEDICARE

## 2024-08-05 VITALS
OXYGEN SATURATION: 98 % | HEIGHT: 61 IN | RESPIRATION RATE: 10 BRPM | HEART RATE: 69 BPM | DIASTOLIC BLOOD PRESSURE: 60 MMHG | BODY MASS INDEX: 28.15 KG/M2 | WEIGHT: 149.1 LBS | SYSTOLIC BLOOD PRESSURE: 92 MMHG

## 2024-08-05 DIAGNOSIS — F51.01 PRIMARY INSOMNIA: ICD-10-CM

## 2024-08-05 DIAGNOSIS — M62.838 MUSCLE SPASM: ICD-10-CM

## 2024-08-05 DIAGNOSIS — Z00.00 ENCOUNTER FOR MEDICARE ANNUAL WELLNESS EXAM: Primary | ICD-10-CM

## 2024-08-05 PROCEDURE — 99213 OFFICE O/P EST LOW 20 MIN: CPT | Mod: 25 | Performed by: FAMILY MEDICINE

## 2024-08-05 PROCEDURE — G0439 PPPS, SUBSEQ VISIT: HCPCS | Performed by: FAMILY MEDICINE

## 2024-08-05 RX ORDER — TRAZODONE HYDROCHLORIDE 50 MG/1
50 TABLET, FILM COATED ORAL
Qty: 90 TABLET | Refills: 3 | Status: SHIPPED | OUTPATIENT
Start: 2024-08-05

## 2024-08-05 NOTE — PROGRESS NOTES
"Preventive Care Visit  [facility]  Blossom Ramirez MD, Family Medicine  Aug 5, 2024      Assessment & Plan     Encounter for Medicare annual wellness exam  Health maintenance updated, preventive services reviewed, vaccines discussed, questions are answered, patient encouraged to continue annual wellness visits to review preventive services    Muscle spasm  stable  - tiZANidine (ZANAFLEX) 4 MG tablet; Take 1 tablet (4 mg) by mouth 3 times daily as needed for muscle spasms    Primary insomnia  stable  - traZODone (DESYREL) 50 MG tablet; Take 1 tablet (50 mg) by mouth once as needed for sleep            BMI  Estimated body mass index is 28.19 kg/m  as calculated from the following:    Height as of this encounter: 1.549 m (5' 0.98\").    Weight as of this encounter: 67.6 kg (149 lb 1.6 oz).       Counseling  Appropriate preventive services were addressed with this patient via screening, questionnaire, or discussion as appropriate for fall prevention, nutrition, physical activity, Tobacco-use cessation, weight loss and cognition.  Checklist reviewing preventive services available has been given to the patient.  Reviewed patient's diet, addressing concerns and/or questions.   Discussed possible causes of fatigue. Updated plan of care.  Patient reported difficulty with activities of daily living were addressed today.The patient was provided with written information regarding signs of hearing loss.   Information on urinary incontinence and treatment options given to patient.           Brandee Zacarias is a 74 year old, presenting for the following:  Annual Visit        8/5/2024     2:51 PM   Additional Questions   Roomed by Li TOWNSEND CMA   Accompanied by None         Health Care Directive  Patient does not have a Health Care Directive or Living Will: Patient states has Advance Directive and will bring in a copy to clinic.    Here for AWV  Overall has been good, labs up to date, reviewed " immunizations          7/31/2024   General Health   How would you rate your overall physical health? (!) DECLINE   Feel stress (tense, anxious, or unable to sleep) To some extent      (!) STRESS CONCERN      7/31/2024   Nutrition   Diet: Low salt            7/31/2024   Exercise   Days per week of moderate/strenous exercise 0 days   Average minutes spent exercising at this level 0 min      (!) EXERCISE CONCERN      7/31/2024   Social Factors   Worry food won't last until get money to buy more No   Food not last or not have enough money for food? No   Do you have housing? (Housing is defined as stable permanent housing and does not include staying ouside in a car, in a tent, in an abandoned building, in an overnight shelter, or couch-surfing.) Yes   Are you worried about losing your housing? No   Lack of transportation? No   Unable to get utilities (heat,electricity)? No            8/5/2024   Fall Risk   Reason Gait Speed Test Not Completed Patient declines   Reason for decline Has a walker - does not use, no concerns.               7/31/2024   Activities of Daily Living- Home Safety   Needs help with the following daily activites Housework   Safety concerns in the home None of the above            7/31/2024   Dental   Dentist two times every year? Yes            7/31/2024   Hearing Screening   Hearing concerns? (!) IT'S HARD TO FOLLOW A CONVERSATION IN A NOISY RESTAURANT OR CROWDED ROOM.    (!) TROUBLE UNDERSTANDING SOFT OR WHISPERED SPEECH.       Multiple values from one day are sorted in reverse-chronological order         7/31/2024   Driving Risk Screening   Patient/family members have concerns about driving No            7/31/2024   General Alertness/Fatigue Screening   Have you been more tired than usual lately? (!) YES            7/31/2024   Urinary Incontinence Screening   Bothered by leaking urine in past 6 months Yes            7/31/2024   TB Screening   Were you born outside of the [place]? No            Today's  PHQ-2 Score:       2024     3:44 PM   PHQ-2 (  [employer])   Q1: Little interest or pleasure in doing things 1   Q2: Feeling down, depressed or hopeless 1   PHQ-2 Score 2   Q1: Little interest or pleasure in doing things Several days   Q2: Feeling down, depressed or hopeless Several days   PHQ-2 Score 2           2024   Substance Use   Alcohol more than 3/day or more than 7/wk No   Do you have a current opioid prescription? No   How severe/bad is pain from 1 to 10? 9/10   Do you use any other substances recreationally? No        Social History     Tobacco Use    Smoking status: Former     Current packs/day: 0.00     Types: Cigarettes     Quit date:      Years since quittin.6     Passive exposure: Past    Smokeless tobacco: Never   Vaping Use    Vaping status: Never Used   Substance Use Topics    Alcohol use: Yes     Comment: 5 per week    Drug use: No              ASCVD Risk   The 10-year ASCVD risk score (Thom COLON, et al., 2019) is: 10.4%    Values used to calculate the score:      Age: 74 years      Sex: Female      Is Non- : No      Diabetic: No      Tobacco smoker: No      Systolic Blood Pressure: 92 mmHg      Is BP treated: Yes      HDL Cholesterol: 59 mg/dL      Total Cholesterol: 216 mg/dL            Reviewed and updated as needed this visit by Provider   Tobacco  Allergies  Meds  Problems  Med Hx  Surg Hx  Fam Hx     Sexual Activity            Current providers sharing in care for this patient include:  Patient Care Team:  Blossom Ramirez MD as PCP - General (Family Medicine)  Nato Obrien OD as PCP - Ophthalmology (Ophthalmology)  Blossom Ramirez MD as Assigned PCP    The following health maintenance items are reviewed in Epic and correct as of today:  Health Maintenance   Topic Date Due    DEPRESSION ACTION PLAN  Never done    HEPATITIS C SCREENING  Never done    RSV VACCINE (Pregnancy & 60+) (1 - 1-dose 60+ series) Never done     "COVID-19 Vaccine (4 - 2023-24 season) 09/01/2023    ANNUAL REVIEW OF HM ORDERS  07/19/2024    INFLUENZA VACCINE (1) 09/01/2024    PHQ-9  11/30/2024    MAMMO SCREENING  06/29/2025    MEDICARE ANNUAL WELLNESS VISIT  08/05/2025    FALL RISK ASSESSMENT  08/05/2025    COLORECTAL CANCER SCREENING  05/11/2026    GLUCOSE  04/17/2027    LIPID  07/19/2028    ADVANCE CARE PLANNING  08/05/2029    DEXA  03/14/2032    DTAP/TDAP/TD IMMUNIZATION (3 - Td or Tdap) 09/13/2032    Pneumococcal Vaccine: 65+ Years  Completed    ZOSTER IMMUNIZATION  Completed    IPV IMMUNIZATION  Aged Out    HPV IMMUNIZATION  Aged Out    MENINGITIS IMMUNIZATION  Aged Out    RSV MONOCLONAL ANTIBODY  Aged Out    URINE DRUG SCREEN  Discontinued            Objective    Exam  BP 92/60   Pulse 69   Resp 10   Ht 1.549 m (5' 0.98\")   Wt 67.6 kg (149 lb 1.6 oz)   LMP  (LMP Unknown)   SpO2 98%   BMI 28.19 kg/m     Estimated body mass index is 28.19 kg/m  as calculated from the following:    Height as of this encounter: 1.549 m (5' 0.98\").    Weight as of this encounter: 67.6 kg (149 lb 1.6 oz).    Physical Exam  GENERAL: alert and no distress  NECK: no adenopathy, no asymmetry, masses, or scars  RESP: lungs clear to auscultation - no rales, rhonchi or wheezes  CV: regular rate and rhythm, normal S1 S2, no S3 or S4, no murmur, click or rub, no peripheral edema  PSYCH: mentation appears normal, affect normal/bright         8/5/2024   Mini Cog   Clock Draw Score 2 Normal   3 Item Recall 3 objects recalled   Mini Cog Total Score 5                 Signed Electronically by: Blossom Ramirez MD    "

## 2024-08-05 NOTE — PATIENT INSTRUCTIONS
Patient Education   Preventive Care Advice   This is general advice given by our system to help you stay healthy. However, your care team may have specific advice just for you. Please talk to your care team about your preventive care needs.  Nutrition  Eat 5 or more servings of fruits and vegetables each day.  Try wheat bread, brown rice and whole grain pasta (instead of white bread, rice, and pasta).  Get enough calcium and vitamin D. Check the label on foods and aim for 100% of the RDA (recommended daily allowance).  Lifestyle  Exercise at least 150 minutes each week  (30 minutes a day, 5 days a week).  Do muscle strengthening activities 2 days a week. These help control your weight and prevent disease.  No smoking.  Wear sunscreen to prevent skin cancer.  Have a dental exam and cleaning every 6 months.  Yearly exams  See your health care team every year to talk about:  Any changes in your health.  Any medicines your care team has prescribed.  Preventive care, family planning, and ways to prevent chronic diseases.  Shots (vaccines)   HPV shots (up to age 26), if you've never had them before.  Hepatitis B shots (up to age 59), if you've never had them before.  COVID-19 shot: Get this shot when it's due.  Flu shot: Get a flu shot every year.  Tetanus shot: Get a tetanus shot every 10 years.  Pneumococcal, hepatitis A, and RSV shots: Ask your care team if you need these based on your risk.  Shingles shot (for age 50 and up)  General health tests  Diabetes screening:  Starting at age 35, Get screened for diabetes at least every 3 years.  If you are younger than age 35, ask your care team if you should be screened for diabetes.  Cholesterol test: At age 39, start having a cholesterol test every 5 years, or more often if advised.  Bone density scan (DEXA): At age 50, ask your care team if you should have this scan for osteoporosis (brittle bones).  Hepatitis C: Get tested at least once in your life.  STIs (sexually  transmitted infections)  Before age 24: Ask your care team if you should be screened for STIs.  After age 24: Get screened for STIs if you're at risk. You are at risk for STIs (including HIV) if:  You are sexually active with more than one person.  You don't use condoms every time.  You or a partner was diagnosed with a sexually transmitted infection.  If you are at risk for HIV, ask about PrEP medicine to prevent HIV.  Get tested for HIV at least once in your life, whether you are at risk for HIV or not.  Cancer screening tests  Cervical cancer screening: If you have a cervix, begin getting regular cervical cancer screening tests starting at age 21.  Breast cancer scan (mammogram): If you've ever had breasts, begin having regular mammograms starting at age 40. This is a scan to check for breast cancer.  Colon cancer screening: It is important to start screening for colon cancer at age 45.  Have a colonoscopy test every 10 years (or more often if you're at risk) Or, ask your provider about stool tests like a FIT test every year or Cologuard test every 3 years.  To learn more about your testing options, visit:   .  For help making a decision, visit:   https://bit.ly/yf33786.  Prostate cancer screening test: If you have a prostate, ask your care team if a prostate cancer screening test (PSA) at age 55 is right for you.  Lung cancer screening: If you are a current or former smoker ages 50 to 80, ask your care team if ongoing lung cancer screenings are right for you.  For informational purposes only. Not to replace the advice of your health care provider. Copyright   2023 [facility]. All rights reserved. Clinically reviewed by the [facility]. [employer] [id number] - REV 01/24.  Learning About Activities of Daily Living  What are activities of daily living?     Activities of daily living (ADLs) are the basic self-care tasks you do every day. These include eating, bathing, dressing,  and moving around.  As you age, and if you have health problems, you may find that it's harder to do some of these tasks. If so, your doctor can suggest ideas that may help.  To measure what kind of help you may need, your doctor will ask how well you are able to do ADLs. Let your doctor know if there are any tasks that you are having trouble doing. This is an important first step to getting help. And when you have the help you need, you can stay as independent as possible.  How will a doctor assess your ADLs?  Asking about ADLs is part of a routine health checkup your doctor will likely do as you age. Your health check might be done in a doctor's office, in your home, or at a hospital. The goal is to find out if you are having any problems that could make it hard to care for yourself or that make it unsafe for you to be on your own.  To measure your ADLs, your doctor will ask how hard it is for you to do routine tasks. Your doctor may also want to know if you have changed the way you do a task because of a health problem. Your doctor may watch how you:  Walk back and forth.  Keep your balance while you stand or walk.  Move from sitting to standing or from a bed to a chair.  Button or unbutton a shirt or sweater.  Remove and put on your shoes.  It's common to feel a little worried or anxious if you find you can't do all the things you used to be able to do. Talking with your doctor about ADLs is a way to make sure you're as safe as possible and able to care for yourself as well as you can. You may want to bring a caregiver, friend, or family member to your checkup. They can help you talk to your doctor.  Follow-up care is a key part of your treatment and safety. Be sure to make and go to all appointments, and call your doctor if you are having problems. It's also a good idea to know your test results and keep a list of the medicines you take.  Current as of: October 24, 2023  Content Version: 14.1    3626-0341  Healthwise, [employer].   Care instructions adapted under license by your healthcare professional. If you have questions about a medical condition or this instruction, always ask your healthcare professional. [employer] disclaims any warranty or liability for your use of this information.    Preventing Falls: Care Instructions  Injuries and health problems such as trouble walking or poor eyesight can increase your risk of falling. So can some medicines. But there are things you can do to help prevent falls. You can exercise to get stronger. You can also arrange your home to make it safer.    Talk to your doctor about the medicines you take. Ask if any of them increase the risk of falls and whether they can be changed or stopped.   Try to exercise regularly. It can help improve your strength and balance. This can help lower your risk of falling.     Practice fall safety and prevention.    Wear low-heeled shoes that fit well and give your feet good support. Talk to your doctor if you have foot problems that make this hard.  Carry a cellphone or wear a medical alert device that you can use to call for help.  Use stepladders instead of chairs to reach high objects. Don't climb if you're at risk for falls. Ask for help, if needed.  Wear the correct eyeglasses, if you need them.    Make your home safer.    Remove rugs, cords, clutter, and furniture from walkways.  Keep your house well lit. Use night-lights in hallways and bathrooms.  Install and use sturdy handrails on stairways.  Wear nonskid footwear, even inside. Don't walk barefoot or in socks without shoes.    Be safe outside.    Use handrails, curb cuts, and ramps whenever possible.  Keep your hands free by using a shoulder bag or backpack.  Try to walk in well-lit areas. Watch out for uneven ground, changes in pavement, and debris.  Be careful in the winter. Walk on the grass or gravel when sidewalks are slippery. Use de-icer on steps and walkways.  "Add non-slip devices to shoes.    Put grab bars and nonskid mats in your shower or tub and near the toilet. Try to use a shower chair or bath bench when bathing.   Get into a tub or shower by putting in your weaker leg first. Get out with your strong side first. Have a phone or medical alert device in the bathroom with you.   Where can you learn more?  Go to https://www.[employer]/patiented  Enter G117 in the search box to learn more about \"Preventing Falls: Care Instructions.\"  Current as of: July 17, 2023               Content Version: 14.0    0093-5300 [employer].   Care instructions adapted under license by your healthcare professional. If you have questions about a medical condition or this instruction, always ask your healthcare professional. [employer] disclaims any warranty or liability for your use of this information.      Hearing Loss: Care Instructions  Overview     Hearing loss is a sudden or slow decrease in how well you hear. It can range from slight to profound. Permanent hearing loss can occur with aging. It also can happen when you are exposed long-term to loud noise. Examples include listening to loud music, riding motorcycles, or being around other loud machines.  Hearing loss can affect your work and home life. It can make you feel lonely or depressed. You may feel that you have lost your independence. But hearing aids and other devices can help you hear better and feel connected to others.  Follow-up care is a key part of your treatment and safety. Be sure to make and go to all appointments, and call your doctor if you are having problems. It's also a good idea to know your test results and keep a list of the medicines you take.  How can you care for yourself at home?  Avoid loud noises whenever possible. This helps keep your hearing from getting worse.  Always wear hearing protection around loud noises.  Wear a hearing aid as directed.  A professional can help " "you pick a hearing aid that will work best for you.  You can also get hearing aids over the counter for mild to moderate hearing loss.  Have hearing tests as your doctor suggests. They can show whether your hearing has changed. Your hearing aid may need to be adjusted.  Use other devices as needed. These may include:  Telephone amplifiers and hearing aids that can connect to a television, stereo, radio, or microphone.  Devices that use lights or vibrations. These alert you to the doorbell, a ringing telephone, or a baby monitor.  Television closed-captioning. This shows the words at the bottom of the screen. Most new TVs can do this.  TTY (text telephone). This lets you type messages back and forth on the telephone instead of talking or listening. These devices are also called TDD. When messages are typed on the keyboard, they are sent over the phone line to a receiving TTY. The message is shown on a monitor.  Use text messaging, social media, and email if it is hard for you to communicate by telephone.  Try to learn a listening technique called speechreading. It is not lipreading. You pay attention to people's gestures, expressions, posture, and tone of voice. These clues can help you understand what a person is saying. Face the person you are talking to, and have them face you. Make sure the lighting is good. You need to see the other person's face clearly.  Think about counseling if you need help to adjust to your hearing loss.  When should you call for help?  Watch closely for changes in your health, and be sure to contact your doctor if:    You think your hearing is getting worse.     You have new symptoms, such as dizziness or nausea.   Where can you learn more?  Go to https://www.StumbleUpon.net/patiented  Enter R798 in the search box to learn more about \"Hearing Loss: Care Instructions.\"  Current as of: September 27, 2023               Content Version: 14.0    0903-5769 Healthwise, Incorporated.   Care " instructions adapted under license by your healthcare professional. If you have questions about a medical condition or this instruction, always ask your healthcare professional. Healthwise, [facility] disclaims any warranty or liability for your use of this information.      Learning About Sleeping Well  What does sleeping well mean?     Sleeping well means getting enough sleep to feel good and stay healthy. How much sleep is enough varies among people.  The number of hours you sleep and how you feel when you wake up are both important. If you do not feel refreshed, you probably need more sleep. Another sign of not getting enough sleep is feeling tired during the day.  Experts recommend that adults get at least 7 or more hours of sleep per day. Children and older adults need more sleep.  Why is getting enough sleep important?  Getting enough quality sleep is a basic part of good health. When your sleep suffers, your physical health, mood, and your thoughts can suffer too. You may find yourself feeling more grumpy or stressed. Not getting enough sleep also can lead to serious problems, including injury, accidents, anxiety, and depression.  What might cause poor sleeping?  Many things can cause sleep problems, including:  Changes to your sleep schedule.  Stress. Stress can be caused by fear about a single event, such as giving a speech. Or you may have ongoing stress, such as worry about work or school.  Depression, anxiety, and other mental or emotional conditions.  Changes in your sleep habits or surroundings. This includes changes that happen where you sleep, such as noise, light, or sleeping in a different bed. It also includes changes in your sleep pattern, such as having jet lag or working a late shift.  Health problems, such as pain, breathing problems, and restless legs syndrome.  Lack of regular exercise.  Using alcohol, nicotine, or caffeine before bed.  How can you help yourself?  Here are some tips that  "may help you sleep more soundly and wake up feeling more refreshed.  Your sleeping area   Use your bedroom only for sleeping and sex. A bit of light reading may help you fall asleep. But if it doesn't, do your reading elsewhere in the house. Try not to use your TV, computer, smartphone, or tablet while you are in bed.  Be sure your bed is big enough to stretch out comfortably, especially if you have a sleep partner.  Keep your bedroom quiet, dark, and cool. Use curtains, blinds, or a sleep mask to block out light. To block out noise, use earplugs, soothing music, or a \"white noise\" machine.  Your evening and bedtime routine   Create a relaxing bedtime routine. You might want to take a warm shower or bath, or listen to soothing music.  Go to bed at the same time every night. And get up at the same time every morning, even if you feel tired.  What to avoid   Limit caffeine (coffee, tea, caffeinated sodas) during the day, and don't have any for at least 6 hours before bedtime.  Avoid drinking alcohol before bedtime. Alcohol can cause you to wake up more often during the night.  Try not to smoke or use tobacco, especially in the evening. Nicotine can keep you awake.  Limit naps during the day, especially close to bedtime.  Avoid lying in bed awake for too long. If you can't fall asleep or if you wake up in the middle of the night and can't get back to sleep within about 20 minutes, get out of bed and go to another room until you feel sleepy.  Avoid taking medicine right before bed that may keep you awake or make you feel hyper or energized. Your doctor can tell you if your medicine may do this and if you can take it earlier in the day.  If you can't sleep   Imagine yourself in a peaceful, pleasant scene. Focus on the details and feelings of being in a place that is relaxing.  Get up and do a quiet or boring activity until you feel sleepy.  Avoid drinking any liquids before going to bed to help prevent waking up often to " "use the bathroom.  Where can you learn more?  Go to https://www.[employer].net/patiented  Enter J942 in the search box to learn more about \"Learning About Sleeping Well.\"  Current as of: July 10, 2023  Content Version: 14.1 2006-2024 [employer].   Care instructions adapted under license by your healthcare professional. If you have questions about a medical condition or this instruction, always ask your healthcare professional. [employer] disclaims any warranty or liability for your use of this information.    Bladder Training: Care Instructions  Your Care Instructions     Bladder training is used to treat urge incontinence and stress incontinence. Urge incontinence means that the need to urinate comes on so fast that you can't get to a toilet in time. Stress incontinence means that you leak urine because of pressure on your bladder. For example, it may happen when you laugh, cough, or lift something heavy.  Bladder training can increase how long you can wait before you have to urinate. It can also help your bladder hold more urine. And it can give you better control over the urge to urinate.  It is important to remember that bladder training takes a few weeks to a few months to make a difference. You may not see results right away, but don't give up.  Follow-up care is a key part of your treatment and safety. Be sure to make and go to all appointments, and call your doctor if you are having problems. It's also a good idea to know your test results and keep a list of the medicines you take.  How can you care for yourself at home?  Work with your doctor to come up with a bladder training program that is right for you. You may use one or more of the following methods.  Delayed urination  In the beginning, try to keep from urinating for 5 minutes after you first feel the need to go.  While you wait, take deep, slow breaths to relax. Kegel exercises can also help you delay the need to go to " "the bathroom.  After some practice, when you can easily wait 5 minutes to urinate, try to wait 10 minutes before you urinate.  Slowly increase the waiting period until you are able to control when you have to urinate.  Scheduled urination  Empty your bladder when you first wake up in the morning.  Schedule times throughout the day when you will urinate.  Start by going to the bathroom every hour, even if you don't need to go.  Slowly increase the time between trips to the bathroom.  When you have found a schedule that works well for you, keep doing it.  If you wake up during the night and have to urinate, do it. Apply your schedule to waking hours only.  Kegel exercises  These tighten and strengthen pelvic muscles, which can help you control the flow of urine. (If doing these exercises causes pain, stop doing them and talk with your doctor.) To do Kegel exercises:  Squeeze your muscles as if you were trying not to pass gas. Or squeeze your muscles as if you were stopping the flow of urine. Your belly, legs, and buttocks shouldn't move.  Hold the squeeze for 3 seconds, then relax for 5 to 10 seconds.  Start with 3 seconds, then add 1 second each week until you are able to squeeze for 10 seconds.  Repeat the exercise 10 times a session. Do 3 to 8 sessions a day.  When should you call for help?  Watch closely for changes in your health, and be sure to contact your doctor if:    Your incontinence is getting worse.     You do not get better as expected.   Where can you learn more?  Go to https://www.[employer]net/patiented  Enter V684 in the search box to learn more about \"Bladder Training: Care Instructions.\"  Current as of: November 15, 2023               Content Version: 14.0    1090-9487 Healthwise, Incorporated.   Care instructions adapted under license by your healthcare professional. If you have questions about a medical condition or this instruction, always ask your healthcare professional. [employer] " [employer] disclaims any warranty or liability for your use of this information.

## 2024-09-07 DIAGNOSIS — F51.01 PRIMARY INSOMNIA: ICD-10-CM

## 2024-09-09 ENCOUNTER — MYC REFILL (OUTPATIENT)
Dept: FAMILY MEDICINE | Facility: CLINIC | Age: 74
End: 2024-09-09
Payer: MEDICARE

## 2024-09-09 DIAGNOSIS — F51.01 PRIMARY INSOMNIA: ICD-10-CM

## 2024-09-09 RX ORDER — TRAZODONE HYDROCHLORIDE 50 MG/1
50 TABLET, FILM COATED ORAL
Qty: 90 TABLET | Refills: 3 | Status: CANCELLED | OUTPATIENT
Start: 2024-09-09

## 2024-09-09 RX ORDER — TRAZODONE HYDROCHLORIDE 50 MG/1
TABLET, FILM COATED ORAL
Qty: 90 TABLET | Refills: 3 | OUTPATIENT
Start: 2024-09-09

## 2024-10-30 ENCOUNTER — MEDICAL CORRESPONDENCE (OUTPATIENT)
Dept: HEALTH INFORMATION MANAGEMENT | Facility: CLINIC | Age: 74
End: 2024-10-30
Payer: MEDICARE

## 2024-11-01 ENCOUNTER — ANCILLARY PROCEDURE (OUTPATIENT)
Dept: GENERAL RADIOLOGY | Facility: CLINIC | Age: 74
End: 2024-11-01
Attending: ORTHOPAEDIC SURGERY
Payer: MEDICARE

## 2024-11-01 DIAGNOSIS — M54.50 LOW BACK PAIN: ICD-10-CM

## 2024-11-07 DIAGNOSIS — I10 HTN (HYPERTENSION): ICD-10-CM

## 2024-11-07 RX ORDER — LOSARTAN POTASSIUM 100 MG/1
TABLET ORAL
Qty: 90 TABLET | Refills: 2 | Status: SHIPPED | OUTPATIENT
Start: 2024-11-07

## 2024-11-08 ENCOUNTER — MYC MEDICAL ADVICE (OUTPATIENT)
Dept: FAMILY MEDICINE | Facility: CLINIC | Age: 74
End: 2024-11-08
Payer: MEDICARE

## 2024-12-06 ENCOUNTER — TRANSFERRED RECORDS (OUTPATIENT)
Dept: HEALTH INFORMATION MANAGEMENT | Facility: CLINIC | Age: 74
End: 2024-12-06
Payer: MEDICARE

## 2024-12-06 ENCOUNTER — MEDICAL CORRESPONDENCE (OUTPATIENT)
Dept: HEALTH INFORMATION MANAGEMENT | Facility: CLINIC | Age: 74
End: 2024-12-06
Payer: MEDICARE

## 2024-12-09 ENCOUNTER — PATIENT OUTREACH (OUTPATIENT)
Dept: CARE COORDINATION | Facility: CLINIC | Age: 74
End: 2024-12-09
Payer: MEDICARE

## 2024-12-11 DIAGNOSIS — M54.2 CERVICAL SPINE PAIN: Primary | ICD-10-CM

## 2024-12-17 ENCOUNTER — ANCILLARY PROCEDURE (OUTPATIENT)
Dept: GENERAL RADIOLOGY | Facility: CLINIC | Age: 74
End: 2024-12-17
Attending: ORTHOPAEDIC SURGERY
Payer: MEDICARE

## 2024-12-17 ENCOUNTER — OFFICE VISIT (OUTPATIENT)
Dept: ORTHOPEDICS | Facility: CLINIC | Age: 74
End: 2024-12-17
Attending: ORTHOPAEDIC SURGERY
Payer: MEDICARE

## 2024-12-17 ENCOUNTER — TELEPHONE (OUTPATIENT)
Dept: NEUROSURGERY | Facility: CLINIC | Age: 74
End: 2024-12-17

## 2024-12-17 VITALS — HEIGHT: 61 IN | WEIGHT: 148.6 LBS | BODY MASS INDEX: 28.05 KG/M2

## 2024-12-17 DIAGNOSIS — M47.12 CERVICAL SPONDYLOSIS WITH MYELOPATHY: ICD-10-CM

## 2024-12-17 DIAGNOSIS — M54.12 CERVICAL RADICULAR PAIN: ICD-10-CM

## 2024-12-17 DIAGNOSIS — M48.02 CERVICAL SPINAL STENOSIS: Primary | ICD-10-CM

## 2024-12-17 DIAGNOSIS — M54.2 CERVICAL SPINE PAIN: ICD-10-CM

## 2024-12-17 PROCEDURE — 72050 X-RAY EXAM NECK SPINE 4/5VWS: CPT | Mod: GC | Performed by: RADIOLOGY

## 2024-12-17 PROCEDURE — 99204 OFFICE O/P NEW MOD 45 MIN: CPT | Performed by: ORTHOPAEDIC SURGERY

## 2024-12-17 RX ORDER — CLOBETASOL PROPIONATE 0.5 MG/G
OINTMENT TOPICAL
COMMUNITY
Start: 2024-12-05
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
## 2024-12-17 NOTE — TELEPHONE ENCOUNTER
Called patient to schedule surgery with Dr. Blair, no answer. Callback number [phone] left on vm.     Nohemi Goldsmith on 12/17/2024 at 3:53 PM

## 2024-12-17 NOTE — NURSING NOTE
"Reason For Visit:   Chief Complaint   Patient presents with    Consult     Cervical spine pain referred by Dr. Garg       Primary MD: Blossom Ramirez  Ref. MD: Dr. Garg    ?  No  Occupation Retired.  Currently working? No.  Work status?  Retired.  Date of injury: N/A  Type of injury: N/A.  Date of surgery: N/A  Type of surgery: N/A.  Smoker: No  Request smoking cessation information: No    Ht 1.549 m (5' 1\")   Wt 67.4 kg (148 lb 9.6 oz)   LMP  (LMP Unknown)   BMI 28.08 kg/m      Pain Assessment  Patient Currently in Pain: Yes  Patient's Stated Pain Goal: 5  0-10 Pain Scale: 5  Primary Pain Location: Neck    Oswestry (LUIS) Questionnaire        12/16/2024     3:46 PM   OSWESTRY DISABILITY INDEX   Count 10    Sum 28    Oswestry Score (%) 56 %        Patient-reported            Neck Disability Index (NDI) Questionnaire        12/16/2024     3:50 PM   Neck Disability Index (NDI)   Neck Disability Index: Count 10   NDI: Total Score = SUM (points for all 10 findings) 29   Neck Disability in Percent = (Total Score) / 50 * 100 58 (%)                   Promis 10 Assessment         No data to display                         Darby Henriquez CMA    "

## 2024-12-17 NOTE — TELEPHONE ENCOUNTER
Patient is scheduled for surgery with Dr. Blair.     Spoke with: Patient     Date of Surgery: 1/23/25    Location: [facility]     Pre op with Provider: TRISTIN     H&P: Scheduled for an in clinic visit with PAC on 1/08/2512:30.     Additional imaging/appointments: Patient is scheduled for 6 week post op on 3/10/25 at 11:15.  Patient is scheduled for CT on 1/08/25 at 3:00.     Surgery packet: Will mail packet, confirmed with patient address in chart works best. Patient made aware arrival time for surgery will not be listed within packet.     Additional comments: TRISTIN Goldsmith on 12/17/2024 at 4:01 PM

## 2024-12-17 NOTE — PROGRESS NOTES
Patient left without pre op teaching due to medical ride. Please schedule patient on RN pre op schedule     Alicia Garber RN

## 2024-12-18 NOTE — TELEPHONE ENCOUNTER
FUTURE VISIT INFORMATION      SURGERY INFORMATION:  Date: 25  [facility]  Surgeon:  Cortes Blair MD   Anesthesia Type:  general  Procedure: Cervical 4 to 6 anterior cervical decompression and fusion with medtronic plate and screws, interbody device, use of fluoroscopy, microscope, and josé miguel bone material and local autograft   Consult: ov 24    RECORDS REQUESTED FROM:       Primary Care Provider: Blossom Ramirez MD - [facility]    Pertinent Medical History: hypertension    Most recent EKG+ Tracin23

## 2025-01-07 LAB
ABO + RH BLD: NORMAL
BLD GP AB SCN SERPL QL: NEGATIVE
SPECIMEN EXP DATE BLD: NORMAL

## 2025-01-08 ENCOUNTER — APPOINTMENT (OUTPATIENT)
Dept: LAB | Facility: CLINIC | Age: 75
End: 2025-01-08
Payer: COMMERCIAL

## 2025-01-08 ENCOUNTER — ANCILLARY PROCEDURE (OUTPATIENT)
Dept: CT IMAGING | Facility: CLINIC | Age: 75
End: 2025-01-08
Attending: ORTHOPAEDIC SURGERY
Payer: MEDICARE

## 2025-01-08 ENCOUNTER — PRE VISIT (OUTPATIENT)
Dept: SURGERY | Facility: CLINIC | Age: 75
End: 2025-01-08

## 2025-01-08 ENCOUNTER — LAB (OUTPATIENT)
Dept: LAB | Facility: CLINIC | Age: 75
End: 2025-01-08
Payer: MEDICARE

## 2025-01-08 ENCOUNTER — OFFICE VISIT (OUTPATIENT)
Dept: SURGERY | Facility: CLINIC | Age: 75
End: 2025-01-08
Payer: MEDICARE

## 2025-01-08 VITALS
BODY MASS INDEX: 28.32 KG/M2 | OXYGEN SATURATION: 97 % | SYSTOLIC BLOOD PRESSURE: 130 MMHG | DIASTOLIC BLOOD PRESSURE: 81 MMHG | HEART RATE: 64 BPM | TEMPERATURE: 99 F | HEIGHT: 61 IN | WEIGHT: 150 LBS

## 2025-01-08 DIAGNOSIS — M47.12 CERVICAL SPONDYLOSIS WITH MYELOPATHY: ICD-10-CM

## 2025-01-08 DIAGNOSIS — M54.12 CERVICAL RADICULOPATHY: ICD-10-CM

## 2025-01-08 DIAGNOSIS — Z01.818 PREOP EXAMINATION: Primary | ICD-10-CM

## 2025-01-08 DIAGNOSIS — M48.02 CERVICAL STENOSIS OF SPINAL CANAL: ICD-10-CM

## 2025-01-08 DIAGNOSIS — Z01.818 PREOP EXAMINATION: ICD-10-CM

## 2025-01-08 DIAGNOSIS — G95.9 CERVICAL MYELOPATHY (H): ICD-10-CM

## 2025-01-08 DIAGNOSIS — M54.12 CERVICAL RADICULAR PAIN: ICD-10-CM

## 2025-01-08 DIAGNOSIS — M48.02 CERVICAL SPINAL STENOSIS: ICD-10-CM

## 2025-01-08 LAB
ANION GAP SERPL CALCULATED.3IONS-SCNC: 12 MMOL/L (ref 7–15)
BUN SERPL-MCNC: 15.6 MG/DL (ref 8–23)
CALCIUM SERPL-MCNC: 9.4 MG/DL (ref 8.8–10.4)
CHLORIDE SERPL-SCNC: 94 MMOL/L (ref 98–107)
CREAT SERPL-MCNC: 1.12 MG/DL (ref 0.51–0.95)
EGFRCR SERPLBLD CKD-EPI 2021: 51 ML/MIN/1.73M2
ERYTHROCYTE [DISTWIDTH] IN BLOOD BY AUTOMATED COUNT: 12.1 % (ref 10–15)
GLUCOSE SERPL-MCNC: 120 MG/DL (ref 70–99)
HCO3 SERPL-SCNC: 23 MMOL/L (ref 22–29)
HCT VFR BLD AUTO: 35.2 % (ref 35–47)
HGB BLD-MCNC: 12 G/DL (ref 11.7–15.7)
MCH RBC QN AUTO: 31.5 PG (ref 26.5–33)
MCHC RBC AUTO-ENTMCNC: 34.1 G/DL (ref 31.5–36.5)
MCV RBC AUTO: 92 FL (ref 78–100)
PLATELET # BLD AUTO: 349 10E3/UL (ref 150–450)
POTASSIUM SERPL-SCNC: 3.8 MMOL/L (ref 3.4–5.3)
RBC # BLD AUTO: 3.81 10E6/UL (ref 3.8–5.2)
SODIUM SERPL-SCNC: 129 MMOL/L (ref 135–145)
WBC # BLD AUTO: 6.5 10E3/UL (ref 4–11)

## 2025-01-08 PROCEDURE — 72125 CT NECK SPINE W/O DYE: CPT | Mod: GC | Performed by: RADIOLOGY

## 2025-01-08 PROCEDURE — 85027 COMPLETE CBC AUTOMATED: CPT | Performed by: PATHOLOGY

## 2025-01-08 PROCEDURE — 80048 BASIC METABOLIC PNL TOTAL CA: CPT | Performed by: PATHOLOGY

## 2025-01-08 PROCEDURE — 99203 OFFICE O/P NEW LOW 30 MIN: CPT | Performed by: NURSE PRACTITIONER

## 2025-01-08 PROCEDURE — 36415 COLL VENOUS BLD VENIPUNCTURE: CPT | Performed by: PATHOLOGY

## 2025-01-08 RX ORDER — MULTIVITAMIN WITH IRON
1 TABLET ORAL EVERY EVENING
COMMUNITY

## 2025-01-08 RX ORDER — TRIAMCINOLONE ACETONIDE 1 MG/G
CREAM TOPICAL 2 TIMES DAILY
COMMUNITY
Start: 2024-12-23

## 2025-01-08 RX ORDER — TACROLIMUS 1 MG/G
OINTMENT TOPICAL 2 TIMES DAILY
COMMUNITY
Start: 2024-11-05

## 2025-01-08 ASSESSMENT — LIFESTYLE VARIABLES: TOBACCO_USE: 1

## 2025-01-08 ASSESSMENT — PAIN SCALES - GENERAL: PAINLEVEL_OUTOF10: EXTREME PAIN (8)

## 2025-01-08 NOTE — RESULT ENCOUNTER NOTE
Jaydon Zacarias,    Your test results are attached.  Your labs are okay for surgery.  Your sodium level seems to run low chronically, but I have placed an order for them to recheck it when you arrive for surgery.  Your kidney function is stable.         Saida Lin DNP, RN, ANP-C

## 2025-01-08 NOTE — PROVIDER NOTIFICATION
01/08/25 1333   Discharge Planning   Patient/Family Anticipates Transition to home with family   Concerns to be Addressed all concerns addressed in this encounter   Living Arrangements   People in Home alone;other (see comments)  (Tami friend from [place] will stay with pt during recovery)   Type of Residence Private Residence   Is your private residence a single family home or apartment? Single family home   Number of Stairs, Within Home, Primary greater than 10 stairs   Stair Railings, Within Home, Primary railings safe and in good condition;railings on both sides of stairs   Once home, are you able to live on one level? Yes   Which level? Upper Level   Bathroom Shower/Tub Tub/Shower unit   Equipment Currently Used at Home none   Support System   Support Systems Children;Friends/Neighbors   Do you have someone available to stay with you one or two nights once you are home? Yes

## 2025-01-08 NOTE — PATIENT INSTRUCTIONS
Preparing for Your Surgery      Name:  Deann Shah   MRN:  [id number]   :  1950   Today's Date:  2025       Arriving for surgery:  Surgery date:  25  Arrival time:  9.25AM    Please come to:     Please come to:       [facility] [address]   [address] [place]  [address]     The Green Ramp for patients and visitors is beneath the [facility]. The parking facility entrance is at the intersection of [address] [place] [address] and [address]. Patients and visitors who self-park will receive the reduced hospital parking rate (no ticket validation needed).     [employer] parking, located at the [facility] main entrance on [address] [place] [address], is available Monday - Friday from 7 am to 3:30 pm.     Discounted parking pass options can be purchased from  attendants during business hours.     -Check in at the security desk in the [facility] ([facility])   Lobby. They will direct you to the correct elevators.   -Proceed to the 3rd floor, Adult Surgery Waiting Lounge. [phone]     If you need directions, a wheelchair or escort please stop at the Information Desk in the lobby.  Inform the information person you are here for surgery; a wheelchair and escort to [address] will be provided.   An escort to the Adult Surgery Waiting Lounge will be provided. .    What can I eat or drink?  -  You may eat and drink normally up to 8 hours prior to arrival time. (Until 1.25AM)  -  You may have clear liquids until 2 hours prior to arrival time. (Until 7.25AM)    Examples of clear liquids:  Water  Clear broth  Juices (apple, white grape, white cranberry  and cider) without pulp  Noncarbonated, powder based beverages  (lemonade and Geoffrey-Aid)  Sodas (Sprite, 7-Up, ginger ale and seltzer)  Coffee or tea (without milk or cream)  Gatorade    -  No Alcohol or cannabis products for at least  24 hours before surgery.     Which medicines can I take?    Hold Aspirin for 7 days before surgery.   Hold Multivitamins for 7 days before surgery.  Hold Supplements for 7 days before surgery.  Hold Ibuprofen (Advil, Motrin) for 3 day(s) before surgery--unless otherwise directed by surgeon.  Hold Naproxen (Aleve) for 4 days before surgery.    -  DO NOT take these medications the day of surgery:  Calcium + vitamin D  Chlorthalidone (Hygroton)  Vitamin B12  Losartan (Cozaar)  Hydroxyzine (Atarax)  External ointment    -  PLEASE TAKE these medications the day of surgery:  Tylenol as needed  Amlodipine (Norvasc)  Escitalopram (Lexapro)  Omeprazole (Prilosec)  Tizanidine as needed      How do I prepare myself?  - Please take 2 showers (one the night prior to surgery and one the morning of surgery) using Scrubcare or Hibiclens soap.    Use this soap only from the neck to your toes. Avoid genital area      Leave the soap on your skin for one minute--then rinse thoroughly.      You may use your own shampoo and conditioner. No other hair products.   - Please remove all jewelry and body piercings.  - No lotions, deodorants or fragrance.  - No makeup or fingernail polish.   - Bring your ID and insurance card.    -If you use a CPAP machine, please bring the CPAP machine, tubing, and mask to hospital.    -If you have a Deep Brain Stimulator, Spinal Cord Stimulator, or any Neuro Stimulator device---you must bring the remote control to the hospital.      ALL PATIENTS GOING HOME THE SAME DAY OF SURGERY ARE REQUIRED TO HAVE A RESPONSIBLE ADULT TO DRIVE AND BE IN ATTENDANCE WITH THEM FOR 24 HOURS FOLLOWING SURGERY.    Covid testing policy as of 12/06/2022  Your surgeon will notify and schedule you for a COVID test if one is needed before surgery--please direct any questions or COVID symptoms to your surgeon      Questions or Concerns:    - For any questions regarding the day of surgery or your hospital stay, please contact the Pre  Admission Nursing Office at [phone].       - If you have health changes between today and your surgery, please call your surgeon.       - For questions after surgery, please call your surgeons office.           Current Visitor Guidelines    You may have 2 visitors in the pre op area.    Visiting hours: 8 a.m. to 8:30 p.m.    Patients confirmed or suspected to have symptoms of COVID 19 or flu:     No visitors allowed for adult patients.   Children (under age 18) can have 1 named visitor.     People who are sick or showing symptoms of COVID 19 or flu:    Are not allowed to visit patients--we can only make exceptions in special situations.       Please follow these guidelines for your visit:          Please maintain social distance          Masking is optional--however at times you may be asked to wear a mask for the safety of yourself and others     Clean your hands with alcohol hand . Do this when you arrive at and leave the building and patient room,    And again after you touch your mask or anything in the room.     Go directly to and from the room you are visiting.     Stay in the patient s room during your visit. Limit going to other places in the hospital as much as possible     Leave bags and jackets at home or in the car.     For everyone s health, please don t come and go during your visit. That includes for smoking   during your visit.
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
## 2025-01-24 ENCOUNTER — HOSPITAL ENCOUNTER (INPATIENT)
Facility: CLINIC | Age: 75
LOS: 1 days | Discharge: HOME | DRG: 472 | End: 2025-01-25
Attending: ORTHOPAEDIC SURGERY | Admitting: ORTHOPAEDIC SURGERY
Payer: MEDICARE

## 2025-01-24 ENCOUNTER — APPOINTMENT (OUTPATIENT)
Dept: GENERAL RADIOLOGY | Facility: CLINIC | Age: 75
DRG: 472 | End: 2025-01-24
Attending: ORTHOPAEDIC SURGERY
Payer: MEDICARE

## 2025-01-24 DIAGNOSIS — F51.01 PRIMARY INSOMNIA: ICD-10-CM

## 2025-01-24 DIAGNOSIS — Z98.1 S/P CERVICAL SPINAL FUSION: Primary | ICD-10-CM

## 2025-01-24 LAB
ANION GAP SERPL CALCULATED.3IONS-SCNC: 12 MMOL/L (ref 7–15)
BUN SERPL-MCNC: 21.6 MG/DL (ref 8–23)
CALCIUM SERPL-MCNC: 9.4 MG/DL (ref 8.8–10.4)
CHLORIDE SERPL-SCNC: 97 MMOL/L (ref 98–107)
CREAT SERPL-MCNC: 1.06 MG/DL (ref 0.51–0.95)
EGFRCR SERPLBLD CKD-EPI 2021: 55 ML/MIN/1.73M2
GLUCOSE BLDC GLUCOMTR-MCNC: 98 MG/DL (ref 70–99)
GLUCOSE SERPL-MCNC: 112 MG/DL (ref 70–99)
HCO3 SERPL-SCNC: 24 MMOL/L (ref 22–29)
POTASSIUM SERPL-SCNC: 4 MMOL/L (ref 3.4–5.3)
SODIUM SERPL-SCNC: 133 MMOL/L (ref 135–145)

## 2025-01-24 PROCEDURE — 22853 INSJ BIOMECHANICAL DEVICE: CPT | Performed by: ORTHOPAEDIC SURGERY

## 2025-01-24 PROCEDURE — 999N000180 XR SURGERY CARM FLUORO LESS THAN 5 MIN

## 2025-01-24 PROCEDURE — 370N000017 HC ANESTHESIA TECHNICAL FEE, PER MIN: Performed by: ORTHOPAEDIC SURGERY

## 2025-01-24 PROCEDURE — 0RT30ZZ RESECTION OF CERVICAL VERTEBRAL DISC, OPEN APPROACH: ICD-10-PCS | Performed by: ORTHOPAEDIC SURGERY

## 2025-01-24 PROCEDURE — C1762 CONN TISS, HUMAN(INC FASCIA): HCPCS | Performed by: ORTHOPAEDIC SURGERY

## 2025-01-24 PROCEDURE — 36415 COLL VENOUS BLD VENIPUNCTURE: CPT | Performed by: NURSE PRACTITIONER

## 2025-01-24 PROCEDURE — 360N000085 HC SURGERY LEVEL 5 W/ FLUORO, PER MIN: Performed by: ORTHOPAEDIC SURGERY

## 2025-01-24 PROCEDURE — 22551 ARTHRD ANT NTRBDY CERVICAL: CPT | Performed by: ORTHOPAEDIC SURGERY

## 2025-01-24 PROCEDURE — 82310 ASSAY OF CALCIUM: CPT | Performed by: NURSE PRACTITIONER

## 2025-01-24 PROCEDURE — 20936 SP BONE AGRFT LOCAL ADD-ON: CPT | Performed by: ORTHOPAEDIC SURGERY

## 2025-01-24 PROCEDURE — 250N000011 HC RX IP 250 OP 636: Performed by: STUDENT IN AN ORGANIZED HEALTH CARE EDUCATION/TRAINING PROGRAM

## 2025-01-24 PROCEDURE — 250N000013 HC RX MED GY IP 250 OP 250 PS 637: Performed by: PHYSICIAN ASSISTANT

## 2025-01-24 PROCEDURE — 710N000010 HC RECOVERY PHASE 1, LEVEL 2, PER MIN: Performed by: ORTHOPAEDIC SURGERY

## 2025-01-24 PROCEDURE — 250N000009 HC RX 250: Performed by: ORTHOPAEDIC SURGERY

## 2025-01-24 PROCEDURE — C1713 ANCHOR/SCREW BN/BN,TIS/BN: HCPCS | Performed by: ORTHOPAEDIC SURGERY

## 2025-01-24 PROCEDURE — 22845 INSERT SPINE FIXATION DEVICE: CPT | Mod: XU | Performed by: ORTHOPAEDIC SURGERY

## 2025-01-24 PROCEDURE — 272N000001 HC OR GENERAL SUPPLY STERILE: Performed by: ORTHOPAEDIC SURGERY

## 2025-01-24 PROCEDURE — 00NW0ZZ RELEASE CERVICAL SPINAL CORD, OPEN APPROACH: ICD-10-PCS | Performed by: ORTHOPAEDIC SURGERY

## 2025-01-24 PROCEDURE — 120N000002 HC R&B MED SURG/OB UMMC

## 2025-01-24 PROCEDURE — 99222 1ST HOSP IP/OBS MODERATE 55: CPT | Performed by: PHYSICIAN ASSISTANT

## 2025-01-24 PROCEDURE — 01N10ZZ RELEASE CERVICAL NERVE, OPEN APPROACH: ICD-10-PCS | Performed by: ORTHOPAEDIC SURGERY

## 2025-01-24 PROCEDURE — 250N000011 HC RX IP 250 OP 636: Performed by: PHYSICIAN ASSISTANT

## 2025-01-24 PROCEDURE — 20930 SP BONE ALGRFT MORSEL ADD-ON: CPT | Performed by: ORTHOPAEDIC SURGERY

## 2025-01-24 PROCEDURE — 0RG20A0 FUSION OF 2 OR MORE CERVICAL VERTEBRAL JOINTS WITH INTERBODY FUSION DEVICE, ANTERIOR APPROACH, ANTERIOR COLUMN, OPEN APPROACH: ICD-10-PCS | Performed by: ORTHOPAEDIC SURGERY

## 2025-01-24 PROCEDURE — 250N000025 HC SEVOFLURANE, PER MIN: Performed by: ORTHOPAEDIC SURGERY

## 2025-01-24 PROCEDURE — 22552 ARTHRD ANT NTRBD CERVICAL EA: CPT | Performed by: ORTHOPAEDIC SURGERY

## 2025-01-24 PROCEDURE — 999N000141 HC STATISTIC PRE-PROCEDURE NURSING ASSESSMENT: Performed by: ORTHOPAEDIC SURGERY

## 2025-01-24 PROCEDURE — 250N000013 HC RX MED GY IP 250 OP 250 PS 637: Performed by: ORTHOPAEDIC SURGERY

## 2025-01-24 DEVICE — IMP PLATE ANT CERV MEDT ATLANTIS VISION ELITE 40MM 7200040: Type: IMPLANTABLE DEVICE | Site: SPINE CERVICAL | Status: FUNCTIONAL

## 2025-01-24 DEVICE — IMPLANTABLE DEVICE: Type: IMPLANTABLE DEVICE | Site: SPINE CERVICAL | Status: FUNCTIONAL

## 2025-01-24 DEVICE — ANATOMIC PEEK W/ NANO 18 X 16 X 6 X 10°
Type: IMPLANTABLE DEVICE | Site: SPINE CERVICAL | Status: FUNCTIONAL
Brand: ANATOMIC PEEK™ CERVICAL FUSION SYSTEM WITH NANOTECHNOLOGY

## 2025-01-24 DEVICE — GRAFT BONE FIBERS GRAFTON DBM DBF 3ML T50103: Type: IMPLANTABLE DEVICE | Site: SPINE CERVICAL | Status: FUNCTIONAL

## 2025-01-24 RX ORDER — NALOXONE HYDROCHLORIDE 0.4 MG/ML
0.2 INJECTION, SOLUTION INTRAMUSCULAR; INTRAVENOUS; SUBCUTANEOUS
Status: DISCONTINUED | OUTPATIENT
Start: 2025-01-24 | End: 2025-01-25 | Stop reason: HOSPADM

## 2025-01-24 RX ORDER — SODIUM CHLORIDE, SODIUM LACTATE, POTASSIUM CHLORIDE, CALCIUM CHLORIDE 600; 310; 30; 20 MG/100ML; MG/100ML; MG/100ML; MG/100ML
INJECTION, SOLUTION INTRAVENOUS CONTINUOUS
Status: DISCONTINUED | OUTPATIENT
Start: 2025-01-24 | End: 2025-01-24 | Stop reason: HOSPADM

## 2025-01-24 RX ORDER — AMOXICILLIN 250 MG
1 CAPSULE ORAL DAILY
Qty: 30 TABLET | Refills: 0 | Status: SHIPPED | OUTPATIENT
Start: 2025-01-24

## 2025-01-24 RX ORDER — HYDROMORPHONE HYDROCHLORIDE 1 MG/ML
0.1 INJECTION, SOLUTION INTRAMUSCULAR; INTRAVENOUS; SUBCUTANEOUS EVERY 4 HOURS PRN
Status: DISCONTINUED | OUTPATIENT
Start: 2025-01-24 | End: 2025-01-25 | Stop reason: HOSPADM

## 2025-01-24 RX ORDER — FENTANYL CITRATE 50 UG/ML
25 INJECTION, SOLUTION INTRAMUSCULAR; INTRAVENOUS EVERY 5 MIN PRN
Status: DISCONTINUED | OUTPATIENT
Start: 2025-01-24 | End: 2025-01-24 | Stop reason: HOSPADM

## 2025-01-24 RX ORDER — FAMOTIDINE 20 MG/1
20 TABLET, FILM COATED ORAL 2 TIMES DAILY
Status: DISCONTINUED | OUTPATIENT
Start: 2025-01-24 | End: 2025-01-25 | Stop reason: HOSPADM

## 2025-01-24 RX ORDER — LIDOCAINE 40 MG/G
CREAM TOPICAL
Status: DISCONTINUED | OUTPATIENT
Start: 2025-01-24 | End: 2025-01-25 | Stop reason: HOSPADM

## 2025-01-24 RX ORDER — LOSARTAN POTASSIUM 50 MG/1
100 TABLET ORAL EVERY EVENING
Status: DISCONTINUED | OUTPATIENT
Start: 2025-01-24 | End: 2025-01-25 | Stop reason: HOSPADM

## 2025-01-24 RX ORDER — FENTANYL CITRATE 50 UG/ML
50 INJECTION, SOLUTION INTRAMUSCULAR; INTRAVENOUS EVERY 5 MIN PRN
Status: DISCONTINUED | OUTPATIENT
Start: 2025-01-24 | End: 2025-01-24 | Stop reason: HOSPADM

## 2025-01-24 RX ORDER — NALOXONE HYDROCHLORIDE 0.4 MG/ML
0.4 INJECTION, SOLUTION INTRAMUSCULAR; INTRAVENOUS; SUBCUTANEOUS
Status: DISCONTINUED | OUTPATIENT
Start: 2025-01-24 | End: 2025-01-25 | Stop reason: HOSPADM

## 2025-01-24 RX ORDER — ONDANSETRON 4 MG/1
4 TABLET, ORALLY DISINTEGRATING ORAL EVERY 30 MIN PRN
Status: DISCONTINUED | OUTPATIENT
Start: 2025-01-24 | End: 2025-01-24 | Stop reason: HOSPADM

## 2025-01-24 RX ORDER — CEFAZOLIN SODIUM/WATER 2 G/20 ML
2 SYRINGE (ML) INTRAVENOUS
Status: COMPLETED | OUTPATIENT
Start: 2025-01-24 | End: 2025-01-24

## 2025-01-24 RX ORDER — CEFAZOLIN SODIUM 1 G/3ML
1 INJECTION, POWDER, FOR SOLUTION INTRAMUSCULAR; INTRAVENOUS EVERY 8 HOURS
Status: DISCONTINUED | OUTPATIENT
Start: 2025-01-24 | End: 2025-01-25 | Stop reason: HOSPADM

## 2025-01-24 RX ORDER — ESCITALOPRAM OXALATE 10 MG/1
20 TABLET ORAL DAILY
Status: DISCONTINUED | OUTPATIENT
Start: 2025-01-25 | End: 2025-01-25 | Stop reason: HOSPADM

## 2025-01-24 RX ORDER — OXYCODONE HYDROCHLORIDE 5 MG/1
5 TABLET ORAL EVERY 4 HOURS PRN
Status: DISCONTINUED | OUTPATIENT
Start: 2025-01-24 | End: 2025-01-25 | Stop reason: HOSPADM

## 2025-01-24 RX ORDER — ONDANSETRON 2 MG/ML
4 INJECTION INTRAMUSCULAR; INTRAVENOUS EVERY 6 HOURS PRN
Status: DISCONTINUED | OUTPATIENT
Start: 2025-01-24 | End: 2025-01-25 | Stop reason: HOSPADM

## 2025-01-24 RX ORDER — POLYETHYLENE GLYCOL 3350 17 G/17G
17 POWDER, FOR SOLUTION ORAL DAILY
Status: DISCONTINUED | OUTPATIENT
Start: 2025-01-25 | End: 2025-01-25 | Stop reason: HOSPADM

## 2025-01-24 RX ORDER — B-COMPLEX WITH VITAMIN C
1 TABLET ORAL EVERY EVENING
Status: CANCELLED | OUTPATIENT
Start: 2025-01-24

## 2025-01-24 RX ORDER — METHOCARBAMOL 500 MG/1
750 TABLET ORAL EVERY 6 HOURS PRN
Status: DISCONTINUED | OUTPATIENT
Start: 2025-01-24 | End: 2025-01-24

## 2025-01-24 RX ORDER — HYDROMORPHONE HYDROCHLORIDE 1 MG/ML
0.2 INJECTION, SOLUTION INTRAMUSCULAR; INTRAVENOUS; SUBCUTANEOUS EVERY 4 HOURS PRN
Status: DISCONTINUED | OUTPATIENT
Start: 2025-01-24 | End: 2025-01-25 | Stop reason: HOSPADM

## 2025-01-24 RX ORDER — ACETAMINOPHEN 325 MG/1
975 TABLET ORAL EVERY 8 HOURS
Status: DISCONTINUED | OUTPATIENT
Start: 2025-01-24 | End: 2025-01-25 | Stop reason: HOSPADM

## 2025-01-24 RX ORDER — PHENYLEPHRINE HCL IN 0.9% NACL 50MG/250ML
.2-1.25 PLASTIC BAG, INJECTION (ML) INTRAVENOUS CONTINUOUS
Status: DISCONTINUED | OUTPATIENT
Start: 2025-01-24 | End: 2025-01-24 | Stop reason: ALTCHOICE

## 2025-01-24 RX ORDER — TRAZODONE HYDROCHLORIDE 50 MG/1
50 TABLET, FILM COATED ORAL
Status: ON HOLD | COMMUNITY
End: 2025-01-25

## 2025-01-24 RX ORDER — DEXAMETHASONE SODIUM PHOSPHATE 10 MG/ML
10 INJECTION, SOLUTION INTRAMUSCULAR; INTRAVENOUS EVERY 8 HOURS
Status: COMPLETED | OUTPATIENT
Start: 2025-01-24 | End: 2025-01-25

## 2025-01-24 RX ORDER — CEFAZOLIN SODIUM/WATER 2 G/20 ML
2 SYRINGE (ML) INTRAVENOUS SEE ADMIN INSTRUCTIONS
Status: DISCONTINUED | OUTPATIENT
Start: 2025-01-24 | End: 2025-01-24 | Stop reason: HOSPADM

## 2025-01-24 RX ORDER — ONDANSETRON 2 MG/ML
4 INJECTION INTRAMUSCULAR; INTRAVENOUS EVERY 30 MIN PRN
Status: DISCONTINUED | OUTPATIENT
Start: 2025-01-24 | End: 2025-01-24 | Stop reason: HOSPADM

## 2025-01-24 RX ORDER — HYDROMORPHONE HYDROCHLORIDE 1 MG/ML
0.4 INJECTION, SOLUTION INTRAMUSCULAR; INTRAVENOUS; SUBCUTANEOUS EVERY 5 MIN PRN
Status: DISCONTINUED | OUTPATIENT
Start: 2025-01-24 | End: 2025-01-24 | Stop reason: HOSPADM

## 2025-01-24 RX ORDER — NALOXONE HYDROCHLORIDE 0.4 MG/ML
0.1 INJECTION, SOLUTION INTRAMUSCULAR; INTRAVENOUS; SUBCUTANEOUS
Status: DISCONTINUED | OUTPATIENT
Start: 2025-01-24 | End: 2025-01-24 | Stop reason: HOSPADM

## 2025-01-24 RX ORDER — METHOCARBAMOL 750 MG/1
750 TABLET, FILM COATED ORAL EVERY 6 HOURS PRN
Status: DISCONTINUED | OUTPATIENT
Start: 2025-01-24 | End: 2025-01-25 | Stop reason: HOSPADM

## 2025-01-24 RX ORDER — TRAZODONE HYDROCHLORIDE 50 MG/1
50 TABLET, FILM COATED ORAL
Status: DISCONTINUED | OUTPATIENT
Start: 2025-01-24 | End: 2025-01-25 | Stop reason: HOSPADM

## 2025-01-24 RX ORDER — ACETAMINOPHEN 325 MG/1
650 TABLET ORAL EVERY 4 HOURS PRN
Qty: 50 TABLET | Refills: 0 | Status: SHIPPED | OUTPATIENT
Start: 2025-01-24

## 2025-01-24 RX ORDER — METHOCARBAMOL 750 MG/1
750 TABLET, FILM COATED ORAL EVERY 6 HOURS PRN
Qty: 60 TABLET | Refills: 0 | Status: SHIPPED | OUTPATIENT
Start: 2025-01-24

## 2025-01-24 RX ORDER — BISACODYL 10 MG
10 SUPPOSITORY, RECTAL RECTAL DAILY PRN
Status: DISCONTINUED | OUTPATIENT
Start: 2025-01-24 | End: 2025-01-25 | Stop reason: HOSPADM

## 2025-01-24 RX ORDER — AMOXICILLIN 250 MG
1 CAPSULE ORAL 2 TIMES DAILY
Status: DISCONTINUED | OUTPATIENT
Start: 2025-01-24 | End: 2025-01-25 | Stop reason: HOSPADM

## 2025-01-24 RX ORDER — AMLODIPINE BESYLATE 2.5 MG/1
2.5 TABLET ORAL EVERY EVENING
Status: DISCONTINUED | OUTPATIENT
Start: 2025-01-24 | End: 2025-01-25 | Stop reason: HOSPADM

## 2025-01-24 RX ORDER — OXYCODONE HYDROCHLORIDE 5 MG/1
5-10 TABLET ORAL EVERY 4 HOURS PRN
Qty: 26 TABLET | Refills: 0 | Status: SHIPPED | OUTPATIENT
Start: 2025-01-24

## 2025-01-24 RX ORDER — HYDROMORPHONE HYDROCHLORIDE 1 MG/ML
0.2 INJECTION, SOLUTION INTRAMUSCULAR; INTRAVENOUS; SUBCUTANEOUS EVERY 5 MIN PRN
Status: DISCONTINUED | OUTPATIENT
Start: 2025-01-24 | End: 2025-01-24 | Stop reason: HOSPADM

## 2025-01-24 RX ORDER — ONDANSETRON 4 MG/1
4 TABLET, ORALLY DISINTEGRATING ORAL EVERY 6 HOURS PRN
Status: DISCONTINUED | OUTPATIENT
Start: 2025-01-24 | End: 2025-01-25 | Stop reason: HOSPADM

## 2025-01-24 RX ORDER — PANTOPRAZOLE SODIUM 40 MG/1
40 TABLET, DELAYED RELEASE ORAL EVERY MORNING
Status: DISCONTINUED | OUTPATIENT
Start: 2025-01-25 | End: 2025-01-25 | Stop reason: HOSPADM

## 2025-01-24 RX ORDER — CHLORTHALIDONE 25 MG/1
25 TABLET ORAL EVERY EVENING
Status: DISCONTINUED | OUTPATIENT
Start: 2025-01-24 | End: 2025-01-25 | Stop reason: HOSPADM

## 2025-01-24 RX ORDER — MAGNESIUM HYDROXIDE 1200 MG/15ML
LIQUID ORAL PRN
Status: DISCONTINUED | OUTPATIENT
Start: 2025-01-24 | End: 2025-01-24 | Stop reason: HOSPADM

## 2025-01-24 RX ORDER — GABAPENTIN 100 MG/1
100 CAPSULE ORAL
Status: COMPLETED | OUTPATIENT
Start: 2025-01-24 | End: 2025-01-24

## 2025-01-24 RX ADMIN — FENTANYL CITRATE 50 MCG: 50 INJECTION INTRAMUSCULAR; INTRAVENOUS at 16:03

## 2025-01-24 RX ADMIN — METHOCARBAMOL 750 MG: 750 TABLET ORAL at 16:48

## 2025-01-24 RX ADMIN — FAMOTIDINE 20 MG: 20 TABLET, FILM COATED ORAL at 20:50

## 2025-01-24 RX ADMIN — CEFAZOLIN 1 G: 1 INJECTION, POWDER, FOR SOLUTION INTRAMUSCULAR; INTRAVENOUS at 20:51

## 2025-01-24 RX ADMIN — HYDROMORPHONE HYDROCHLORIDE 0.4 MG: 1 INJECTION, SOLUTION INTRAMUSCULAR; INTRAVENOUS; SUBCUTANEOUS at 16:21

## 2025-01-24 RX ADMIN — HYDROMORPHONE HYDROCHLORIDE 0.4 MG: 1 INJECTION, SOLUTION INTRAMUSCULAR; INTRAVENOUS; SUBCUTANEOUS at 16:09

## 2025-01-24 RX ADMIN — HYDROMORPHONE HYDROCHLORIDE 0.4 MG: 1 INJECTION, SOLUTION INTRAMUSCULAR; INTRAVENOUS; SUBCUTANEOUS at 16:51

## 2025-01-24 RX ADMIN — FENTANYL CITRATE 50 MCG: 50 INJECTION INTRAMUSCULAR; INTRAVENOUS at 15:38

## 2025-01-24 RX ADMIN — SENNOSIDES AND DOCUSATE SODIUM 1 TABLET: 50; 8.6 TABLET ORAL at 20:50

## 2025-01-24 RX ADMIN — HYDROMORPHONE HYDROCHLORIDE 0.4 MG: 1 INJECTION, SOLUTION INTRAMUSCULAR; INTRAVENOUS; SUBCUTANEOUS at 16:14

## 2025-01-24 RX ADMIN — DEXAMETHASONE SODIUM PHOSPHATE 10 MG: 10 INJECTION INTRAMUSCULAR; INTRAVENOUS at 20:50

## 2025-01-24 RX ADMIN — FENTANYL CITRATE 50 MCG: 50 INJECTION INTRAMUSCULAR; INTRAVENOUS at 15:55

## 2025-01-24 RX ADMIN — OXYCODONE 5 MG: 5 TABLET ORAL at 20:50

## 2025-01-24 RX ADMIN — GABAPENTIN 100 MG: 100 CAPSULE ORAL at 10:54

## 2025-01-24 RX ADMIN — FENTANYL CITRATE 50 MCG: 50 INJECTION INTRAMUSCULAR; INTRAVENOUS at 15:47

## 2025-01-24 RX ADMIN — HYDROMORPHONE HYDROCHLORIDE 0.4 MG: 1 INJECTION, SOLUTION INTRAMUSCULAR; INTRAVENOUS; SUBCUTANEOUS at 16:32

## 2025-01-24 ASSESSMENT — ACTIVITIES OF DAILY LIVING (ADL)
ADLS_ACUITY_SCORE: 37
ADLS_ACUITY_SCORE: 20
ADLS_ACUITY_SCORE: 37
ADLS_ACUITY_SCORE: 37
ADLS_ACUITY_SCORE: 20
ADLS_ACUITY_SCORE: 37
ADLS_ACUITY_SCORE: 20
ADLS_ACUITY_SCORE: 37
ADLS_ACUITY_SCORE: 20
ADLS_ACUITY_SCORE: 20
ADLS_ACUITY_SCORE: 37
ADLS_ACUITY_SCORE: 37
ADLS_ACUITY_SCORE: 38
ADLS_ACUITY_SCORE: 37
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
## 2025-01-24 NOTE — BRIEF OP NOTE
Brief Operative Note    Preop Dx:   Cervical radicular pain [M54.12]  Cervical spinal stenosis [M48.02]  Cervical spondylosis with myelopathy [M47.12]  Post op Dx:   Same  Procedure:    Procedure(s):  Cervical 4 to 6 Anterior Cervical Decompression and Fusion with Medtronic Plate and Screws, interbody device, use of Fluoroscopy, Microscope, and [place] Bone Material and Local Autograft  Surgeon:     Dr. Blair   Assistants:    Gino Narayan DO Spine Fellow   Anesthesia:   General  EBL:    20mL   Total IV Fluids:  See Anesthesia Record  Specimens:   None  Findings:   See Operative Dictation  Complications:  None.    Assessment and Plan: Deann Shah is a 74 year old female now s/p C4-6 ACDF on 1/24 with Dr. Blair.     Ortho (Johnnie) Primary  Activity:   - Up with assist until independent. No excessive bending or twisting. No lifting >10 lbs x 6 weeks.   Weight bearing status: WBAT.  Pain management:   - Transition to PO narcotics as tolerated. No NSAIDs x 3 months.   Antibiotics: Ancef until drains out   Diet: Begin with clear fluids and progress diet as tolerated.   DVT prophylaxis: SCDs only. No chemical DVT ppx needed.  Imaging: XR Upright C spine PTDC - ordered.  Labs: Hgb POD#1.  Bracing/Splinting: [place] J on at all times except when eating and bathing.  Dressings: Keep dressing c/d/i.  Drains: Document output per shift, will be discontinued at Orthopedic Surgery discretion.  Boyce catheter: None  Physical Therapy/Occupational Therapy: Eval and treat.  Cultures: none.    Consults: Hospitalist.  Follow-up: Clinic with Dr. Blair in 6 weeks with repeat x-rays.   Disposition: Pending progress with therapies, pain control on orals, and medical stability, anticipate discharge to home on POD #1-3.    The procedure was medically necessary for an assistant. My assistance was necessary for patient positioning, prepping and draping, soft tissue retraction, bone graft preparation and placement, and closure. The  assistance that I provided reduced operative time which meant less general anesthetic for the patient.    Gino Narayan,   Spine Fellow       Implants:   Implant Name Type Inv. Item Serial No.  Lot No. LRB No. Used Action   SPACER ANTMC PEEK 10D W/ NANOTECH [id number] - [id number] Metallic Hardware/[place] SPACER ANTMC PEEK 10D W/ NANOTECH [id number]  [employer] [id number] N/A 1 Implanted   GRAFT BONE FIBERS MAEGAN DBM DBF 3ML [id number] - [id number] Bone/Tissue/Biologic GRAFT BONE FIBERS MAEGAN DBM DBF 3ML [id number] [employer]  N/A 1 Implanted   SPACER ANTMC PEEK 10D W/ NANOTECH [id number] - [id number] Metallic Hardware/[place] SPACER ANTMC PEEK 10D W/ NANOTECH [id number]  [employer] [id number] N/A 1 Implanted   IMP PLATE ANT CERV MEDT ATLANTIS VISION ELITE 35MM [id number] - [id number] Metallic Hardware/[place] IMP PLATE ANT CERV MEDT ATLANTIS VISION ELITE 35MM [id number]  [employer]  N/A 1 Wasted   IMP PLATE ANT CERV MEDT ATLANTIS VISION ELITE 40MM [id number] - [id number] Metallic Hardware/[place] IMP PLATE ANT CERV MEDT ATLANTIS VISION ELITE 40MM [id number]  [employer]  N/A 1 Implanted   IMP SCR CERVICAL MEDT ATLANTIS 4.0X19MM ST [place] [id number] - [id number] Metallic Hardware/[place] IMP SCR CERVICAL MEDT ATLANTIS 4.0X19MM ST VA [id number]  [employer]  N/A 1 Implanted   SCREW BN 17MM 4.5MM TI ST [place] NS ATLNTS SPNE CRV ANT [id number] - [id number] Metallic Hardware/[place] SCREW BN 17MM 4.5MM TI ST VA NS ATLNTS SPNE CRV ANT [id number]  [employer]  N/A 5 Implanted

## 2025-01-24 NOTE — OP NOTE
DATE OF SURGERY: 1/24/2025    PREOPERATIVE DIAGNOSIS:   Cervical radicular pain [M54.12]  Cervical spinal stenosis [M48.02]  Cervical spondylosis with myelopathy [M47.12]               POSTOPERATIVE DIAGNOSIS: Same    PROCEDURES:  1. Anterior cervical discectomy with removal of disc material and osteophytes at C4-5 and C5-6 for decompression of the spinal cord.  2. Bilateral Foraminotomies at C4-5 and C5-6  for decompression of the nerve roots.  3. Placement of intervertebral prosthetic device at C4-5 and C5-6 , Medtronic PEEK Cages.  4. Use of [place] Demineralized Bone Matrix and anterior osteophyte local autograft for fusion, C4-5 and C5-6.  5. Placement of anterior Medtronic plate and screw instrumentation, C4-6.  The plate was placed separately from the interbody and was placed to aid in the fusion rate.    Primary Surgeon: Cortes Blair MD    FIRST ASSISTANT: Gino Narayan, Fellow Surgeon, The assistant was necessary for all phases of the operation, including discectomy, instrumentation, decompression,  placement of the interbody devices, and all bone work and also for visualization, and closure.  There was no qualified resident available.      ANESTHESIA: General Endotracheal    COMPLICATIONS:  None.    SPECIMENS: None.    ESTIMATED BLOOD LOSS:25cc    INDICATIONS:                          Deann Shah is a 74 year old female with debilitating symptoms from Cervical radicular pain [M54.12]  Cervical spinal stenosis [M48.02]  Cervical spondylosis with myelopathy [M47.12].  The patient elected surgical treatment, and understood the indications for this surgery, as well as its risks, benefits, and alternatives. We reviewed the risks and benefits of the surgery in detail. The risks include, but are not limited to, the general risks associated with anesthesia, including death, pulmonary embolism, DVT, stroke, myocardial infarction, pneumonia, and urinary tract infection. Additional risks specific to the  surgery include the risk of infection, failure to heal (non-union), dural tear with resultant CSF leak which might necessitate placement of a drain or revision surgery or could result in headaches, nerve injury resulting in weakness or paralysis, risk of adjacent segment disease, the risks of vascular injury resulting in severe or possibly uncontrollable bleeding, need for revision surgery in the future due to one of the above issues, or risk of incomplete symptom relief.  Deann Shah understands the risks of the surgery and wishes to proceed.  No guarantees were given.    DESCRIPTION OF PROCEDURE:           Deann Shah was taken to the operating  room, where the Anesthesiology Service induced satisfactory general anesthesia.  Ancef was given IV.  Venous thromboembolic prophylaxis  was performed with sequential devices placed on the calves bilaterally.  Boyce catheter was placed under standard sterile techniques. A bump was placed under the shoulders the arms were secured.  All pressure points were well padded.  The anterior neck was prepped and draped in its entirety in the usual sterile fashion. We then held a multidisciplinary time out in which we verified the patient, procedure, antibiotics, and operative plan.  All team members were in agreement.    I then performed a standard danielson-ward approach to the anterior spine from the patient's Left side.  I used surface landmarks to identify the incision location.  The skin was sharply incised and I then switched to electrocautery to go through the platysma.  Sub-platysmal dissection was bluntly performed 3cm above and below the incision to relax the soft tissues.   I then used a combination of scissor dissection and blunt dissection to carefully dissect the plane between the SCM and the soft tissues medially, down to the spine. Crossing vessels were tied off as we encountered them.  A spinal needle was placed into the most accessible vertebral body, and a  fluoroscopic image was obtained to verify the level.  The surgical disc space was marked with a pen.      I then used electrocautery to elevate the longus coli over the levels of the planned fusion from C4-C6. We then turned our attention to the first level of decompression.      I used cautery to define the disc C5-6 space.  A 15 blade was then used to incise the annulus caudal and cephalad. A micro-curette was then used to remove the bulk of the disc material.  I next placed caspar pins above and below and the distractor was used to open up the disc space while a poole was used to gently provide distraction.  With this increased visualization I then removed the remainder of the posterior disc material down to the level of the PLL.  With the PLL still intact, I next used a bur to remove the posterior osteophytes above the level of the PLL and to thin the uncinates bilaterally.  The bur was then used to remove the anterior lip of the cephalad vertebral body and to produce a square shaped perpendicular surface for graft placement with a good bed for fusion.  I then sized the graft for a planned 6 Medtronic PEEK cage.  While this was prepared on the back table, I then used a microcurette to split the PLL and create space between the PLL and dura.  A 2mm kerrison was then used to resect the PLL out to the level of the uncinates and also to remove any remaining posterior osteophytes.  This dissection was carried out into the foramen until the uptake of the root was clearly visible and until a nerve hook could be freely passed out into the foramen bilaterally, thus completing the foraminotomies.  The interbody was filled with jos[place] miguel bone material and local autograft from the anterior osteophytes and then impacted under fluoroscopic guidance.      I then moved up to the C4-5 level.  Caspars and trimlines were moved up a level. A 15 blade was then used to incise the annulus caudal and cephalad. A micro-curette was then  used to remove the bulk of the disc material.  The distractor was used to open up the disc space while a poole was used to gently provide distraction.  With this increased visualization I then removed the remainder of the posterior disc material down to the level of the PLL.  With the PLL still intact, I next used a bur to remove the posterior osteophytes above the level of the PLL and to thin the uncinates bilaterally.  The bur was then used to remove the anterior lip of the cephalad vertebral body and to produce a square shaped perpendicular surface for graft placement with a good bed for fusion.  I then sized the graft for a planned 6 Medtronic PEEK cage.  While this was prepared on the back table, I then used a microcurette to split the PLL and create space between the PLL and dura.  A 2mm kerrison was then used to resect the PLL out to the level of the uncinates and also to remove any remaining posterior osteophytes.  This dissection was carried out into the foramen until the uptake of the root was clearly visible and until a nerve hook could be freely passed out into the foramen bilaterally, thus completing the foraminotomies.  The interbody was filled with josé miguel bone material and local autograft from the anterior osteophytes and then impacted under fluoroscopic guidance.      I measured an appropriate length plate and the plate was aligned and screw holes were drilled.  The screws were placed under manual power.  The placement of the anterior plate with screw fixation was not placed to anchor the interbody device but rather the anterior plate was placed to aid in a successful fusion.   We took our final fluoroscopic radiographs and I was satisfied with the positioning.      I then thoroughly irrigated.  A 1/8 inch hemovac drain was placed and the wound was closed in layers with 3-0 vicryl for the platysma, 3-0 vicryl for the dermis, and 4-0 monocryl and dermabond for the skin.  The wound was dressed with 4x4  and tegaderm.      The patient was extubated and taken to the PACU in stable condition.  There were no immediately evident complications.    Sensory monitoring was stable throughout.    ICortes MD, was personally present and scrubbed for the entire procedure.

## 2025-01-25 ENCOUNTER — APPOINTMENT (OUTPATIENT)
Dept: GENERAL RADIOLOGY | Facility: CLINIC | Age: 75
DRG: 472 | End: 2025-01-25
Attending: PHYSICIAN ASSISTANT
Payer: MEDICARE

## 2025-01-25 ENCOUNTER — APPOINTMENT (OUTPATIENT)
Dept: OCCUPATIONAL THERAPY | Facility: CLINIC | Age: 75
DRG: 472 | End: 2025-01-25
Attending: ORTHOPAEDIC SURGERY
Payer: MEDICARE

## 2025-01-25 VITALS
RESPIRATION RATE: 18 BRPM | TEMPERATURE: 98.2 F | DIASTOLIC BLOOD PRESSURE: 79 MMHG | WEIGHT: 151.24 LBS | HEART RATE: 76 BPM | SYSTOLIC BLOOD PRESSURE: 114 MMHG | HEIGHT: 61 IN | BODY MASS INDEX: 28.55 KG/M2 | OXYGEN SATURATION: 97 %

## 2025-01-25 LAB
ANION GAP SERPL CALCULATED.3IONS-SCNC: 11 MMOL/L (ref 7–15)
BUN SERPL-MCNC: 20.4 MG/DL (ref 8–23)
CALCIUM SERPL-MCNC: 9 MG/DL (ref 8.8–10.4)
CHLORIDE SERPL-SCNC: 98 MMOL/L (ref 98–107)
CREAT SERPL-MCNC: 1.04 MG/DL (ref 0.51–0.95)
EGFRCR SERPLBLD CKD-EPI 2021: 56 ML/MIN/1.73M2
GLUCOSE BLDC GLUCOMTR-MCNC: 200 MG/DL (ref 70–99)
GLUCOSE SERPL-MCNC: 169 MG/DL (ref 70–99)
HCO3 SERPL-SCNC: 22 MMOL/L (ref 22–29)
HGB BLD-MCNC: 11.6 G/DL (ref 11.7–15.7)
POTASSIUM SERPL-SCNC: 4.3 MMOL/L (ref 3.4–5.3)
SODIUM SERPL-SCNC: 131 MMOL/L (ref 135–145)

## 2025-01-25 PROCEDURE — 97535 SELF CARE MNGMENT TRAINING: CPT | Mod: GO | Performed by: OCCUPATIONAL THERAPIST

## 2025-01-25 PROCEDURE — 250N000013 HC RX MED GY IP 250 OP 250 PS 637: Performed by: PHYSICIAN ASSISTANT

## 2025-01-25 PROCEDURE — 250N000011 HC RX IP 250 OP 636: Performed by: PHYSICIAN ASSISTANT

## 2025-01-25 PROCEDURE — 99233 SBSQ HOSP IP/OBS HIGH 50: CPT | Performed by: STUDENT IN AN ORGANIZED HEALTH CARE EDUCATION/TRAINING PROGRAM

## 2025-01-25 PROCEDURE — 97165 OT EVAL LOW COMPLEX 30 MIN: CPT | Mod: GO | Performed by: OCCUPATIONAL THERAPIST

## 2025-01-25 PROCEDURE — 80048 BASIC METABOLIC PNL TOTAL CA: CPT | Performed by: PHYSICIAN ASSISTANT

## 2025-01-25 PROCEDURE — 72040 X-RAY EXAM NECK SPINE 2-3 VW: CPT

## 2025-01-25 PROCEDURE — 85018 HEMOGLOBIN: CPT | Performed by: PHYSICIAN ASSISTANT

## 2025-01-25 PROCEDURE — 250N000013 HC RX MED GY IP 250 OP 250 PS 637: Performed by: ORTHOPAEDIC SURGERY

## 2025-01-25 PROCEDURE — 999N000147 HC STATISTIC PT IP EVAL DEFER

## 2025-01-25 PROCEDURE — 36415 COLL VENOUS BLD VENIPUNCTURE: CPT | Performed by: PHYSICIAN ASSISTANT

## 2025-01-25 PROCEDURE — 72040 X-RAY EXAM NECK SPINE 2-3 VW: CPT | Mod: 26 | Performed by: RADIOLOGY

## 2025-01-25 RX ORDER — TRAZODONE HYDROCHLORIDE 50 MG/1
50 TABLET, FILM COATED ORAL
Qty: 90 TABLET | Refills: 3 | Status: SHIPPED | OUTPATIENT
Start: 2025-01-25

## 2025-01-25 RX ORDER — AMOXICILLIN 250 MG
1-2 CAPSULE ORAL 2 TIMES DAILY
Qty: 30 TABLET | Refills: 0 | Status: SHIPPED | OUTPATIENT
Start: 2025-01-25

## 2025-01-25 RX ORDER — ACETAMINOPHEN 325 MG/1
650 TABLET ORAL EVERY 4 HOURS PRN
Qty: 100 TABLET | Refills: 0 | Status: SHIPPED | OUTPATIENT
Start: 2025-01-25

## 2025-01-25 RX ADMIN — PANTOPRAZOLE SODIUM 40 MG: 40 TABLET, DELAYED RELEASE ORAL at 08:58

## 2025-01-25 RX ADMIN — OXYCODONE 5 MG: 5 TABLET ORAL at 01:00

## 2025-01-25 RX ADMIN — CEFAZOLIN 1 G: 1 INJECTION, POWDER, FOR SOLUTION INTRAMUSCULAR; INTRAVENOUS at 05:25

## 2025-01-25 RX ADMIN — METHOCARBAMOL 750 MG: 750 TABLET ORAL at 05:24

## 2025-01-25 RX ADMIN — ACETAMINOPHEN 975 MG: 325 TABLET, FILM COATED ORAL at 01:01

## 2025-01-25 RX ADMIN — SENNOSIDES AND DOCUSATE SODIUM 1 TABLET: 50; 8.6 TABLET ORAL at 08:58

## 2025-01-25 RX ADMIN — FAMOTIDINE 20 MG: 20 TABLET, FILM COATED ORAL at 08:58

## 2025-01-25 RX ADMIN — DEXAMETHASONE SODIUM PHOSPHATE 10 MG: 10 INJECTION INTRAMUSCULAR; INTRAVENOUS at 13:01

## 2025-01-25 RX ADMIN — POLYETHYLENE GLYCOL 3350 17 G: 17 POWDER, FOR SOLUTION ORAL at 09:01

## 2025-01-25 RX ADMIN — DEXAMETHASONE SODIUM PHOSPHATE 10 MG: 10 INJECTION INTRAMUSCULAR; INTRAVENOUS at 05:25

## 2025-01-25 RX ADMIN — OXYCODONE 5 MG: 5 TABLET ORAL at 05:24

## 2025-01-25 RX ADMIN — OXYCODONE 5 MG: 5 TABLET ORAL at 08:58

## 2025-01-25 RX ADMIN — OXYCODONE 5 MG: 5 TABLET ORAL at 14:44

## 2025-01-25 RX ADMIN — TRAZODONE HYDROCHLORIDE 50 MG: 50 TABLET ORAL at 01:01

## 2025-01-25 RX ADMIN — ACETAMINOPHEN 975 MG: 325 TABLET, FILM COATED ORAL at 08:58

## 2025-01-25 RX ADMIN — ESCITALOPRAM OXALATE 20 MG: 10 TABLET ORAL at 08:58

## 2025-01-25 ASSESSMENT — ACTIVITIES OF DAILY LIVING (ADL)
ADLS_ACUITY_SCORE: 25
ADLS_ACUITY_SCORE: 20
ADLS_ACUITY_SCORE: 25
ADLS_ACUITY_SCORE: 20
ADLS_ACUITY_SCORE: 20
ADLS_ACUITY_SCORE: 25
ADLS_ACUITY_SCORE: 20

## 2025-01-25 NOTE — PROGRESS NOTES
"Orthopaedic Surgery Progress Note:       Subjective:   No acute events overnight. Patient reports doing well. Pain well controlled on current regimen. Tolerating diet with minimal dysphagia. Urinating. States her right arm pain is much improved compared to pre-op. Would like to go home today    Objective:   /65 (BP Location: Right arm)   Pulse 66   Temp 98.1  F (36.7  C) (Oral)   Resp 15   Ht 1.549 m (5' 1\")   Wt 68.6 kg (151 lb 3.8 oz)   LMP  (LMP Unknown)   SpO2 95%   BMI 28.58 kg/m    No intake/output data recorded.  Gen: NAD. Resting comfortably in bed  Resp: Breathing comfortably on RA    Cervical spine:    Dressings clean and dry  Drain in place and maintaining suction        Appearance -no gross step-offs, kyphosis.    Motor -     C5: Deltoids R 5/5 and L 5/5 strength    C6: Biceps R 5/5 and L no motion on this side     C7: Triceps R 5/5 and L no motion on this side     C8:  R 5/5 and L no motion on this side     T1: Dorsal interossei R 5/5 and L no motion on this side       Sensation: intact to light touch in C5-T1 right side    Labs:  Lab Results   Component Value Date    WBC 6.5 01/08/2025    HGB 11.6 (L) 01/25/2025     01/08/2025        Assessment & Plan:   Assessment and Plan: Deann Shah is a 74 year old female now s/p C4-6 ACDF on 1/24 with Dr. Blair.     --ambulate with therapy  --pain control  --monitor drain output -plan to remove later today as long as output remains <30 for 1 shift  --progress diet as tolerated  --xrays this morning   --plan to discharge home later this afternoon once xrays done, cleared therapy, and drain out      Ortho (Johnnie) Primary  Activity:   - Up with assist until independent. No excessive bending or twisting. No lifting >10 lbs x 6 weeks.   Weight bearing status: WBAT.  Pain management:   - Transition to PO narcotics as tolerated. No NSAIDs x 3 months.   Antibiotics: Ancef until drains out   Diet: Begin with clear fluids and progress diet as " tolerated.   DVT prophylaxis: SCDs only. No chemical DVT ppx needed.  Imaging: XR Upright C spine PTDC - ordered.  Labs: Hgb POD#1.  Bracing/Splinting: [place] J on at all times except when eating and bathing.  Dressings: Keep dressing c/d/i.  Drains: Document output per shift, will be discontinued at Orthopedic Surgery discretion.  Boyce catheter: None  Physical Therapy/Occupational Therapy: Eval and treat.  Cultures: none.    Consults: Hospitalist.  Follow-up: Clinic with Dr. Blair in 6 weeks with repeat x-rays.   Disposition: Pending progress with therapies, pain control on orals, and medical stability, anticipate discharge to home on POD #1-3.    Gino Narayan, DO  Spine Fellow

## 2025-01-25 NOTE — PHARMACY-ADMISSION MEDICATION HISTORY
Pharmacist Admission Medication History    Admission medication history is complete. The information provided in this note is only as accurate as the sources available at the time of the update.    Information Source(s): Patient via in-person    Pertinent Information: none    Changes made to PTA medication list:  Added: None  Deleted: triamcinolone cream, tacrolimus ointment, clobetasol ointment  Changed: None    Allergies reviewed with patient and updates made in EHR: yes    Medication History Completed By: Gabriela Mtz RPH 1/24/2025 8:20 PM    PTA Med List   Medication Sig Last Dose/Taking    acetaminophen (TYLENOL) 325 MG tablet Take 2 tablets (650 mg) by mouth every 4 hours as needed for pain. Taking As Needed    acetaminophen (TYLENOL) 325 MG tablet Take 2 tablets (650 mg) by mouth every 6 hours as needed for mild pain 1/23/2025 Evening    amLODIPine (NORVASC) 2.5 MG tablet Take 1 tablet (2.5 mg) by mouth daily (Patient taking differently: Take 2.5 mg by mouth every evening.) 1/23/2025 Evening    amoxicillin (AMOXIL) 500 MG capsule Take 4 capsules (2,000 mg) by mouth once as needed (1 hour before dental procedure) Take prior to dental procedures More than a month    Calcium Carbonate-Vitamin D (CALCIUM + D) 600-200 MG-UNIT per tablet Take 1 tablet by mouth every evening Past Week    chlorthalidone (HYGROTON) 25 MG tablet Take 1 tablet (25 mg) by mouth daily (Patient taking differently: Take 25 mg by mouth every evening.) 1/23/2025 Evening    cyanocobalamin (VITAMIN B-12) 100 MCG tablet Take 100 mcg by mouth every evening. Unsure dosage Past Week    escitalopram (LEXAPRO) 20 MG tablet Take 1 tablet (20 mg) by mouth daily (Patient taking differently: Take 20 mg by mouth every evening.) 1/23/2025 Evening    hydrOXYzine (ATARAX) 10 MG tablet Take 1 tablet (10 mg) by mouth every 6 hours as needed for itching or anxiety (with pain, moderate pain) More than a month    losartan (COZAAR) 100 MG tablet TAKE 1  TABLET(100 MG) BY MOUTH DAILY (Patient taking differently: Take 100 mg by mouth every evening.) 1/23/2025 Evening    magnesium 250 MG tablet Take 1 tablet by mouth every evening. Past Week    methocarbamol (ROBAXIN) 750 MG tablet Take 1 tablet (750 mg) by mouth every 6 hours as needed for muscle spasms (muscle spasm). Taking As Needed    Multiple Vitamins-Minerals (HAIR SKIN & NAILS PO) Take by mouth every morning. Past Week    omeprazole (PRILOSEC) 20 MG DR capsule Take 1 capsule (20 mg) by mouth daily (Patient taking differently: Take 20 mg by mouth every morning.) 1/24/2025 Morning    oxyCODONE (ROXICODONE) 5 MG tablet Take 1-2 tablets (5-10 mg) by mouth every 4 hours as needed for severe pain. Taking As Needed    senna-docusate (SENOKOT-S/PERICOLACE) 8.6-50 MG tablet Take 1 tablet by mouth daily. Taking    tiZANidine (ZANAFLEX) 4 MG tablet Take 1 tablet (4 mg) by mouth 3 times daily as needed for muscle spasms Past Week    traZODone (DESYREL) 50 MG tablet Take 50 mg by mouth nightly as needed for sleep. 1/23/2025 Bedtime

## 2025-01-25 NOTE — PLAN OF CARE
Goal Outcome Evaluation:      Plan of Care Reviewed With: patient, significant other    Overall Patient Progress: improvingOverall Patient Progress: improving    Alert and oriented x4. Vitals stable on room air.  Soft collar on.   Drain removed.   Pain managed well with PO meds.   Independent in room, passed therapies.   Xrays done.     Discharge instructions reviewed with patient who verbalizes understanding.   Discharge medications sent to patient's preferred pharmacy. Extra soft collar sent with patient.

## 2025-01-25 NOTE — PROGRESS NOTES
"[facility]    Medicine Progress Note - Hospitalist Service, GOLD TEAM 16    Date of Admission:  1/24/2025    Assessment & Plan   Deann Shah is a 74 year old female with a history of CKD, HTN, anxiety, GERD, and cervical spinal stenosis with radiculopathy admitted following cervical 4 to 6 anterior cervical decompression and fusion on 1/24/2025.    #S/p C4-6 ACDF on 1/24/25: By Dr. Blair. EBL 20 mL. Pre-op Hgb 12.0. Anterior neck drain x 1 in place. Pain controlled. Last BM 1/21.  - Management per Orthopedics, primary   - anticipated d/c today  - ok to d/c from hospitalist standpoint   - med rec completed    #CKD III: BL Cr ~1.1-1.2. Pre-op Cr 1.12 on 1/8/25.   - BMP in AM  - Avoid nephrotoxins, hypotension, and volume depletion  - resume PTA Chlorthalidone and Losartan     #HTN: On Norvasc 2.5 mg daily, Chlorthalidone 25 mg daily, and Losartan 100 mg daily PTA.   - Resume PTA Norvasc with hold parameters.  - Resume PTA Chlorthalidone and Losartan with hold parameters    #Chronic Intermittent Hyponatremia: BL Na ~128-134. Na 133 on 1/24/25.  - Trend in AM.    #Anxiety  - Continue PTA Escitalopram and Trazodone PRN    #GERD  - Continue home PPI. Ortho to consider discontinuing post-op Pepcid.     #s/p L hand amputation  No acute issues        Diet: Regular Diet Adult    DVT Prophylaxis: Defer to primary service  Boyce Catheter: Not present  Lines: None     Cardiac Monitoring: None  Code Status: Full Code    Clinically Significant Risk Factors         # Hyponatremia: Lowest Na = 131 mmol/L in last 2 days, will monitor as appropriate  # Hypochloremia: Lowest Cl = 97 mmol/L in last 2 days, will monitor as appropriate          # Hypertension: Noted on problem list            # Overweight: Estimated body mass index is 28.58 kg/m  as calculated from the following:    Height as of this encounter: 1.549 m (5' 1\").    Weight as of this encounter: 68.6 kg (151 lb 3.8 oz)., " PRESENT ON ADMISSION            Social Drivers of Health    Tobacco Use: Medium Risk (1/8/2025)    Patient History     Smoking Tobacco Use: Former     Smokeless Tobacco Use: Never     Passive Exposure: Past   Physical Activity: Inactive (7/31/2024)    Exercise Vital Sign     Days of Exercise per Week: 0 days     Minutes of Exercise per Session: 0 min   Stress: Stress Concern Present (7/31/2024)    [facility] - Occupational Stress Questionnaire     Feeling of Stress : To some extent    [email or web address]    Social Connections          Disposition Plan     Medically Ready for Discharge: Anticipated Today             Alondra Ho MD  Hospitalist Service, GOLD TEAM 16  [facility]  Securely message with [employer] (more info)  Text page via [employer] Paging/Directory   See signed in provider for up to date coverage information  ______________________________________________________________________    Interval History   NOEs  Admitted yesterday  No complaints    Physical Exam   Vital Signs: Temp: 98.2  F (36.8  C) Temp src: Oral BP: 114/79 Pulse: 76   Resp: 18 SpO2: 97 % O2 Device: None (Room air) Oxygen Delivery: 2 LPM  Weight: 151 lbs 3.77 oz    General: NAD, sitting at side of bed, friend bedside  HEENT: neck brace on   Resp: CTAB, no w/r/r, no increased WOB  CV: RRR, no m/r/g  GI: soft, NTND   MSK:  moving all extremities spontaneously, LUE amputation at wrist  Ext: no BLE edema  Skin: no rash, no lesions  Neuro: AOx4, no focal neuro deficits    Medical Decision Making       60 MINUTES SPENT BY ME on the date of service doing chart review, history, exam, documentation & further activities per the note.      Data   ------------------------- PAST 24 HR DATA REVIEWED -----------------------------------------------    I have personally reviewed the following data over  the past 24 hrs:    N/A  \   11.6 (L)   / N/A     131 (L) 98 20.4 /  200 (H)   4.3 22 1.04 (H) \       Imaging results reviewed over the past 24 hrs:   Recent Results (from the past 24 hours)   XR Surgery ANAT L/T 5 Min Fluoro    Narrative    This exam was marked as non-reportable because it will not be read by a   radiologist or a [place] non-radiologist provider.

## 2025-01-25 NOTE — PLAN OF CARE
"Goal Outcome Evaluation:    Vitally stable, /65 (BP Location: Right arm)   Pulse 66   Temp 98.1  F (36.7  C) (Oral)   Resp 15   Ht 1.549 m (5' 1\")   Wt 68.6 kg (151 lb 3.8 oz)   LMP  (LMP Unknown)   SpO2 95%   BMI 28.58 kg/m      Alert and oriented x4.    Independent in the room.     Hemovac (see output in flowsheet).     Right PIV saline locked.     Pain managed with oral oxycodone, robaxin.    Continue plan of care.                           "
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
## 2025-01-25 NOTE — PLAN OF CARE
Physical Therapy: Orders received. Chart reviewed and discussed with care team.? Physical Therapy not indicated due to per OT pt amb ind and has no mobility related concerns. Appropriate to be followed by single discipline while IP.? Defer discharge recommendations to medical team.? Will complete orders.

## 2025-01-25 NOTE — PLAN OF CARE
Occupational Therapy Discharge Summary    Reason for therapy discharge:    All goals and outcomes met, no further needs identified.    Progress towards therapy goal(s). See goals on Care Plan in [facility] electronic health record for goal details.  Goals met    Therapy recommendation(s):    Continue home exercise program.

## 2025-01-25 NOTE — PLAN OF CARE
"Goal Outcome Evaluation:      Plan of Care Reviewed With: patient    Overall Patient Progress: no changeOverall Patient Progress: no change    Outcome Evaluation: Arrived to unit at 1800      VS: BP (!) 111/95   Pulse 68   Temp 97.8  F (36.6  C) (Oral)   Resp 16   Ht 1.549 m (5' 1\")   Wt 68.6 kg (151 lb 3.8 oz)   LMP  (LMP Unknown)   SpO2 100%   BMI 28.58 kg/m     O2: Stable on RA, denied SOB & CP   Output: Spontaneously voids to bathroom without difficulty   Last BM: 1/21/25, continent   Activity: SBA to bathroom, gait steady   Skin: Refused skin assessment, exposed skin intact   Pain: 4-5/10 while on this unit, \"tolerable\" for her, refused scheduled tylenol   CMS: A/O x4, denied N/T  LLE below the elbow amputation   Dressing: Dressing so surgical incision CDI   Diet: Reg/thin/whole   LDA: R PIV SL   Plan: TBD   Additional Info:        "

## 2025-01-25 NOTE — PROGRESS NOTES
01/25/25 1000   Appointment Info   Signing Clinician's Name / Credentials (OT) Tonny Peterson OTR/L   Living Environment   People in Home   (will have friend staying with her)   Current Living Arrangements house   Home Accessibility no concerns   Transportation Anticipated family or friend will provide   Living Environment Comments stairs to basement but able to get assistance with laundry at first   Self-Care   Usual Activity Tolerance moderate   Current Activity Tolerance moderate   Equipment Currently Used at Home walker, rolling;shower chair;cane, straight   Fall history within last six months no   Activity/Exercise/Self-Care Comment has some equipment at home   General Information   Onset of Illness/Injury or Date of Surgery 01/24/25   Referring Physician Cortes Blair   Additional Occupational Profile Info/Pertinent History of Current Problem Deann Shah is a 74 year old female now s/p C4-6 ACDF on 1/24 with Dr. Blair.   Existing Precautions/Restrictions spinal   Left Upper Extremity (Weight-bearing Status)   (previous ampuation on left hand)   Cognitive Status Examination   Orientation Status orientation to person, place and time   Visual Perception   Visual Impairment/Limitations WFL   Pain Assessment   Patient Currently in Pain No   Range of Motion Comprehensive   General Range of Motion no range of motion deficits identified;bilateral upper extremity ROM WFL   Strength Comprehensive (MMT)   General Manual Muscle Testing (MMT) Assessment no strength deficits identified   Coordination   Coordination Comments R UE WFL   Bed Mobility   Bed Mobility No deficits identified   Transfers   Transfers No deficits identified   Activities of Daily Living   BADL Assessment/Intervention   (education required within precautions)   Clinical Impression   Criteria for Skilled Therapeutic Interventions Met (OT) Yes, treatment indicated   OT Diagnosis decreased ADL independence   OT Problem List-Impairments  impacting ADL problems related to;post-surgical precautions   Assessment of Occupational Performance 1-3 Performance Deficits   Identified Performance Deficits g/h tasks, toileting, dressing   Planned Therapy Interventions (OT) ADL retraining;home program guidelines;transfer training;progressive activity/exercise   Clinical Decision Making Complexity (OT) problem focused assessment/low complexity   Risk & Benefits of therapy have been explained evaluation/treatment results reviewed;patient   Clinical Impression Comments Pt with post surgical precautions, benefits from OT session to assess mobility and ADL and to promote improved independence with ADL   OT Total Evaluation Time   OT Eval, Low Complexity Minutes (01655) 8   OT Goals   Therapy Frequency (OT) One time eval and treatment   OT Predicted Duration/Target Date for Goal Attainment 01/25/25   OT Goals Hygiene/Grooming;Toilet Transfer/Toileting;Lower Body Dressing   OT: Hygiene/Grooming modified independent   OT: Lower Body Dressing Modified independent   OT: Toilet Transfer/Toileting Modified independent   Self-Care/Home Management   Self-Care/Home Mgmt/ADL, Compensatory, Meal Prep Minutes (95123) 10   Symptoms Noted During/After Treatment (Meal Preparation/Planning Training) none   Treatment Detail/Skilled Intervention Therapist providing education on post surgical precautions related to ADL completion.  Pt verbalziing understanding of education and reports no cocnerns with discharge to home, has good support and some equipment available as needed.  Pt able to complete bed mobility independently and ambulating ~250 ft independently.  Therapsit providing education as needed on EC/WS.  Pt reports comfort with discharge to home.   OT Discharge Planning   OT Plan dc   OT Discharge Recommendation (DC Rec) home with assist   OT Rationale for DC Rec Pt moving well, demonstrating understanding of precautions throughout session, reports no concers with discharge to  home   OT Total Distance Amb During Session (feet) 250   Total Session Time   Timed Code Treatment Minutes 10   Total Session Time (sum of timed and untimed services) 18

## 2025-01-25 NOTE — DISCHARGE INSTRUCTIONS
Routine, Wound Care You may leave the dry dressing in place over your incision for 2-3 days after discharge. After this, the dressing can be removed and the wound can be left open to air. If you had a lumbar procedure and your belt line contacts, the incision, then place a dry dressing over the incision daily in order to keep it from being rubbed by your pants. Similarly, if you are wearing a cervical collar and your incision is on your neck, you can keep a dry dressing over the incision to keep it from being rubbed. However you should change the dressing every 1-2 days. If the dressing becomes wet for any reason, such as with sweat or water, it should be changed. 5 days after discharge, you may shower with the dressing removed and allow water to gently fall over the wound. After the shower, simply pat the wound dry. Do not apply and creams or lotions to you wound. You wound should remain free of discharge or significant surrounding redness. If you notice any drainage or discharge, or it it develops significant Redness, please contact us the clinic immediately at [phone]. Ask for Dr Blair's nurse or nurse triage. After hours or weekends, you may go to the Orthopedic walk in clinic on [address] [place] [address] from 7 am to 7pm daily. Activity We encourage you to be up and out of bed regularly. Please try to walk 30 minutes per day. If you have been prescribed a brace, please wear the brace when up and out of bed. Avoid lifting over 10 pounds, avoid lifting anything overhead, and avoid repetitive bending or twisting. This means no sporting activities. (such as running, tennis, bowling, golf, bicycling, etc.) until you are seen in clinic    Wound Care: Your incision was closed with sutures underneath the skin. If you have a brown/tan rubber-like dressing (called Aquacel) applied to your surgical site, you may shower over this and pat dry afterward. You may remove the dressing 1 week after surgery. You may  replace this with gauze and tape. If you would like to keep covered, change the bandages every other day and keep wound clean and dry. Showering is allowed if your incision is dry. No scrubbing or submerging your incision in standing water such as a bath x 4 weeks. Steri-strips (small white tape that is directly on the incision areas), if present, should be left on until they fall off or are removed at your first office visit. You may also note strands of suture from each end of your incision - this is normal and is a knotless type of suture that can be trimmed at its base around 2 weeks from your surgery, otherwise it may be left to fall off on its own.     Pain control: Take medication as prescribed, but only as often as necessary. Do not drive or drink alcohol while you are taking narcotic pain medication. Remember that Tylenol can be used for pain relief as well, as you wean off the narcotics. Do not exceed 4,000 mg of tylenol in 24 hours.

## 2025-01-26 NOTE — DISCHARGE SUMMARY
ORTHOPAEDIC DISCHARGE SUMMARY     Date of Admission: 1/24/2025  Date of Discharge: 1/25/2025  3:00 PM  Disposition: Home  Staff Physician: No att. providers found  Primary Care Provider: Blossom Ramirez    DISCHARGE DIAGNOSIS:  Cervical radicular pain [M54.12]  Cervical spinal stenosis [M48.02]  Cervical spondylosis with myelopathy [M47.12]    PROCEDURES: Procedure(s):  Cervical 4 to 6 Anterior Cervical Decompression and Fusion with [employer] Plate and Screws, interbody device, use of Fluoroscopy, Microscope, and [place] Bone Material and Local Autograft on 1/24/2025    HOSPITAL COURSE:    Surgery was uncomplicated. Deann Shah has done well post-operatively. Medicine was consulted post operatively to aid in management of medical comorbidities. See final recommendations below. The patient received routine nursing cares and is medically stable. Vital signs are stable. The patient is tolerating a regular diet without GI distress/nausea or vomiting. Voiding spontaneously. All PT/OT goals have been met for safe mobility. Pain is now controlled on oral medications which will be available on discharge. Stool softeners have been used while taking pain medications to help prevent constipation. Deann Shah is deemed medically safe to discharge.     Antibiotics:  abx given periop and 24 hours postop.  DVT prophylaxis:  Ambulatory with SCD's  PT Progress:  Has met PT/OT goals for safe mobility.   Pain Meds:  Weaned off all IV pain meds by discharge.  Inpatient Events: No significant events or complications.     Discharge orders and instructions as below.    Medical diagnosis being treated on day of discharge include:     #S/p C4-6 ACDF on 1/24/25: By Dr. Blair. EBL 20 mL. Pre-op Hgb 12.0. Anterior neck drain x 1 in place. Pain controlled. Last BM 1/21.  - Management per Orthopedics, primary              - anticipated d/c today  - ok to d/c from hospitalist standpoint              - med rec completed     #CKD III: BL  Cr ~1.1-1.2. Pre-op Cr 1.12 on 1/8/25.   - BMP in AM  - Avoid nephrotoxins, hypotension, and volume depletion  - resume PTA Chlorthalidone and Losartan      #HTN: On Norvasc 2.5 mg daily, Chlorthalidone 25 mg daily, and Losartan 100 mg daily PTA.   - Resume PTA Norvasc with hold parameters.  - Resume PTA Chlorthalidone and Losartan with hold parameters     #Chronic Intermittent Hyponatremia: BL Na ~128-134. Na 133 on 1/24/25.  - Trend in AM.    #Anxiety  - Continue PTA Escitalopram and Trazodone PRN     #GERD  - Continue home PPI. Ortho to consider discontinuing post-op Pepcid.      #s/p L hand amputation  No acute issues    FOLLOWUP:    Future Appointments   Date Time Provider Department Center   3/10/2025 11:15 AM Cortes Blair MD [facility]       Appointments on [facility]. Call [phone] if you haven't heard regarding these appointments within 7 days of discharge.    PLANNED DISCHARGE ORDERS:     DVT Prophylaxis: Ambulatory      Discharge Medication List as of 1/25/2025  2:37 PM        START taking these medications    Details   methocarbamol (ROBAXIN) 750 MG tablet Take 1 tablet (750 mg) by mouth every 6 hours as needed for muscle spasms (muscle spasm)., Disp-60 tablet, R-0, E-Prescribe      oxyCODONE (ROXICODONE) 5 MG tablet Take 1-2 tablets (5-10 mg) by mouth every 4 hours as needed for severe pain., Disp-26 tablet, R-0, E-Prescribe      !! senna-docusate (SENOKOT-S/PERICOLACE) 8.6-50 MG tablet Take 1-2 tablets by mouth 2 times daily. Take while on oral narcotics to prevent or treat constipation., Disp-30 tablet, R-0, E-PrescribeWhile taking narcotics      !! senna-docusate (SENOKOT-S/PERICOLACE) 8.6-50 MG tablet Take 1 tablet by mouth daily., Disp-30 tablet, R-0, E-Prescribe       !! - Potential duplicate medications found. Please discuss with provider.        CONTINUE these medications which have CHANGED    Details   !! acetaminophen (TYLENOL) 325 MG  tablet Take 2 tablets (650 mg) by mouth every 4 hours as needed for other (mild pain)., Disp-100 tablet, R-0, E-Prescribe      !! acetaminophen (TYLENOL) 325 MG tablet Take 2 tablets (650 mg) by mouth every 4 hours as needed for pain., Disp-50 tablet, R-0, E-Prescribe      traZODone (DESYREL) 50 MG tablet Take 1 tablet (50 mg) by mouth once as needed for sleep., Disp-90 tablet, R-3, E-Prescribe       !! - Potential duplicate medications found. Please discuss with provider.        CONTINUE these medications which have NOT CHANGED    Details   amLODIPine (NORVASC) 2.5 MG tablet Take 1 tablet (2.5 mg) by mouth daily, Disp-90 tablet, R-3, E-PrescribeDid not tolerate 5 mg dose      amoxicillin (AMOXIL) 500 MG capsule Take 4 capsules (2,000 mg) by mouth once as needed (1 hour before dental procedure) Take prior to dental procedures, Disp-16 capsule, R-1, E-Prescribe      Calcium Carbonate-Vitamin D (CALCIUM + D) 600-200 MG-UNIT per tablet Take 1 tablet by mouth every evening, Historical      chlorthalidone (HYGROTON) 25 MG tablet Take 1 tablet (25 mg) by mouth daily, Disp-90 tablet, R-3, E-Prescribe      cyanocobalamin (VITAMIN B-12) 100 MCG tablet Take 100 mcg by mouth every evening. Unsure dosage, Historical      escitalopram (LEXAPRO) 20 MG tablet Take 1 tablet (20 mg) by mouth daily, Disp-90 tablet, R-4, E-Prescribe      hydrOXYzine (ATARAX) 10 MG tablet Take 1 tablet (10 mg) by mouth every 6 hours as needed for itching or anxiety (with pain, moderate pain), Disp-30 tablet, R-0, E-Prescribe      losartan (COZAAR) 100 MG tablet TAKE 1 TABLET(100 MG) BY MOUTH DAILY, Disp-90 tablet, R-2, E-Prescribe      magnesium 250 MG tablet Take 1 tablet by mouth every evening., Historical      Multiple Vitamins-Minerals (HAIR SKIN & NAILS PO) Take by mouth every morning., Historical      omeprazole (PRILOSEC) 20 MG DR capsule Take 1 capsule (20 mg) by mouth daily, Disp-90 capsule, R-4, E-Prescribe      tiZANidine (ZANAFLEX) 4 MG  tablet Take 1 tablet (4 mg) by mouth 3 times daily as needed for muscle spasms, Disp-60 tablet, R-5, E-Prescribe               Discharge Procedure Orders   Discharge Instructions   Order Comments: Review outpatient procedure discharge instructions as directed by Provider.     Notify Provider   Order Comments: Signs and symptoms of infection: Fever greater than 101, redness, swelling, heat at site, drainage, or pus     Discharge Instructions - Neck brace   Order Comments: Wear neck brace at all times.     Discharge Instructions - Contact surgery team   Order Comments: Contact surgical team with any new swelling or tightness to the throat/incision site if it is causing discomfort.     Call 911 if it becomes difficult to breath as this is a medical emergency.     Discharge Instructions - Collar Care   Order Comments: Inspect the skin under your collar TWICE each day for any skin irritation or redness. Switch the collar pads ONCE each day at bedtime.    To remove collar for skin inspection or collar pad changes:  - Remove the collar while lying flat.   - Release front portion first, then the back.    Collar pad care:  - Wash the pads with warm water and air-dry overnight.     Discharge Instructions - Shower with incision NOT covered   Order Comments: You may shower 2 days after surgery.  You do not need to cover your surgical incision in the shower and may allow water and soap to run over top of the incision. Do not soak or submerge the incision underwater.    You have surgical glue over incision site. This will come off with time. Try not to pick or scrub off.     Discharge Instructions - No tub bathing   Order Comments: Tub bathing, swimming, or any other activities that will cause your incision to be submerged in water should be avoided until allowed by your Provider.     Discharge Instructions - Change dressing   Order Comments: Wash hands prior to changing dressing daily. If no drainage on dressing, may leave open  "to air.     Discharge Instructions - Diet   Order Comments: Diet as tolerated. Return to diet before surgery.     Discharge Instructions - Lifting Limit (specify)   Order Comments: Lifting limit of 10 pounds until seen at Post-op follow up appointment.    No lifting overhead.     Return to Clinic   Order Comments: Return to clinic in 6 weeks     Discharge Instructions   Order Comments: Check with Provider for instructions about when to start anticoagulant medication.     No Aspirin or NSAID products   Order Comments: No aspirin or non-steroidal anti-inflammatory drugs (NSAIDs) such as ibuprofen or naproxen until cleared at post-operative appointment     No driving or operating machinery while in a cervical collar   Order Comments: Until follow-up appointment. No driving while on Narcotics.     No Alcohol   Order Comments: No Alcohol for 24 hours after procedure     No VTE Prophylaxis     Reason for your hospital stay   Order Comments: surgery     When to call - Contact Surgeon Team   Order Comments: You may experience symptoms that require follow-up before your scheduled appointment. Refer to the \"Stoplight Tool\" for instructions on when to contact your Surgeon Team if you are concerned about pain control, blood clots, constipation, or if you are unable to urinate.     When to call - Reach out to Urgent Care   Order Comments: If you are not able to reach your Surgeon Team and you need immediate care, go to the Orthopedic Walk-in Clinic or Urgent Care at your Surgeon's office.  Do NOT go to the Emergency Room unless you have shortness of breath, chest pain, or other signs of a medical emergency.     When to call - Reasons to Call 911   Order Comments: Call 911 immediately if you experience sudden-onset chest pain, arm weakness/numbness, slurred speech, or shortness of breath     Discharge Instruction - Breathing exercises   Order Comments: Perform breathing exercises 10 times per hours while awake for 2 weeks. (If " given, use your Incentive Spirometer)     Symptoms - Fever Management   Order Comments: A low grade fever can be expected after surgery.  Use acetaminophen (TYLENOL) as needed for fever management.  Contact your Surgeon Team if you have a fever greater than 101.5 F, chills, and/or night sweats.     Symptoms - Constipation management   Order Comments: Constipation (hard, dry bowel movements) is expected after surgery due to the combination of being less active, the anesthetic, and the opioid pain medication.  You can do the following to help reduce constipation:  ~  FLUIDS:  Drink clear liquids (water or Gatorade), or juice (apple/prune).  ~  DIET:  Eat a fiber rich diet.    ~  ACTIVITY:  Get up and move around several times a day.  Increase your activity as you are able.  MEDICATIONS:  Reduce the risk of constipation by starting medications before you are constipated.  You can take Miralax   (1 packet as directed) and/or a stool softener (Senokot 1-2 tablets 1-2 times a day).  If you already have constipation and these medications are not working, you can get magnesium citrate and use as directed.  If you continue to have constipation you can try an over the counter suppository or enema.  Call your Surgeon Team if it has been greater than 3 days since your last bowel movement.     Symptoms - Reduced Urine Output   Order Comments: Changes in the amount of fluids you drank before and after surgery may result in problems urinating.  It is important to stay well-hydrated after surgery and drink plenty of water. If it has been greater than 8 hours since you have urinated despite drinking plenty of water, call your Surgeon Team.     Comfort and Pain Management - Pain after Surgery   Order Comments: Pain after surgery is normal and expected.  You will have some amount of pain for several weeks after surgery.  Your pain will improve with time.  There are several things you can do to help reduce your pain including: rest,  ice, elevation, and using pain medications as needed. Contact your Surgeon Team if you have pain that persists or worsens after surgery despite rest, ice, elevation, and taking your medication(s) as prescribed. Contact your Surgeon Team if you have new numbness, tingling, or weakness in your operative extremity.     Comfort and Pain Management - Swelling after Surgery   Order Comments: Swelling and/or bruising of the surgical extremity is common and may persist for several months after surgery. In addition to frequent icing and elevation, gentle compressive support with an ACE wrap or tubigrip may help with swelling. Apply compression regularly, removing at least twice daily to perform skin checks. Contact your Surgeon Team if your swelling increases and is NOT associated with an increase in your activity level, or if your swelling increases and is associated with redness and pain.     Comfort and Pain Management - Cold therapy   Order Comments: Ice can be used to control swelling and discomfort after surgery. Place a thin towel over your operative site and apply the ice pack overtop. Leave ice pack in place for 20 minutes, then remove for 20 minutes. Repeat this 20 minutes on/20 minutes off routine as often as tolerated.     Medication Instructions - Acetaminophen (TYLENOL) Instructions   Order Comments: You were discharged with acetaminophen (TYLENOL) for pain management after surgery. Acetaminophen most effectively manages pain symptoms when it is taken on a schedule without missing doses (every four, six, or eight hours). Your Provider will prescribe a safe daily dose between 3000 - 4000 mg.  Do NOT exceed this daily dose. Most patients use acetaminophen for pain control for the first four weeks after surgery.  You can wean from this medication as your pain decreases.     Medication Instructions - NSAID Instructions   Order Comments: You were discharged with an anti-inflammatory medication for pain management to  use in combination with acetaminophen (TYLENOL) and the narcotic pain medication.  Take this medication exactly as directed.  You should only take one anti-inflammatory at a time.  Some common anti-inflammatories include: ibuprofen (ADVIL, MOTRIN), naproxen (ALEVE, NAPROSYN), celecoxib (CELEBREX), meloxicam (MOBIC), ketorolac (TORADOL).  Take this medication with food and water.     Follow Up Care   Order Comments: Follow-up with your Surgeon Team in 2 weeks for wound check.     Opioid Instructions (Greater than or equal to 65 years)   Order Comments: You were discharged with an opioid medication (hydromorphone, oxycodone, hydrocodone, or tramadol). This medication should only be taken for breakthrough pain that is not controlled with acetaminophen (TYLENOL). If you rate your pain less than 3 you do not need this medication. Pain rating 0-3: You do not need this medication Pain rating 4-6: Take 1/2 tablet every 4-6 hours as needed Pain rating 7-10: Take 1 tablet every 4-6 hours as needed Do not exceed 4 tablets per day     Medication Instructions - Opioids - Tapering Instructions   Order Comments: In the first three days following surgery, your symptoms may warrant use of the narcotic pain medication every four to six hours as prescribed. This is normal. As your pain symptoms improve, focus your efforts on decreasing (tapering) use of narcotic medications. The most successful tapering strategy is to first, decrease the number of tablets you take every 4-6 hours to the minimum prescribed. Then, increase the amount of time between doses. For example: First, taper to   or 1 tablet every 4-6 hours. Then, taper to   or 1 tablet every 6-8 hours. Then, taper to   or 1 tablet every 8-10 hours. Then, taper to   or 1 tablet every 10-12 hours. Then, taper to   or 1 tablet at bedtime. The bedtime dose can help with comfort during sleep and is typically the last dose to be discontinued after surgery.     Discharge Instruction -  Regular Diet Adult   Order Comments: Return to your pre-surgery diet unless instructed otherwise     Order Specific Question Answer Comments   Is discharge order? Yes          Gino Narayan, DO  Spine Fellow

## 2025-01-29 ENCOUNTER — TELEPHONE (OUTPATIENT)
Dept: ORTHOPEDICS | Facility: CLINIC | Age: 75
End: 2025-01-29
Payer: MEDICARE

## 2025-01-29 NOTE — TELEPHONE ENCOUNTER
Patient confirmed rescheduled appointment:  Date: 3/10 to 3/11/25  Time: 1:40pm  Visit type: Post Op Spine  Provider: Dr. Blair  Location: [facility] [place]

## 2025-01-30 ENCOUNTER — TELEPHONE (OUTPATIENT)
Dept: NEUROSURGERY | Facility: CLINIC | Age: 75
End: 2025-01-30
Payer: MEDICARE

## 2025-01-30 DIAGNOSIS — Z98.1 S/P CERVICAL SPINAL FUSION: Primary | ICD-10-CM

## 2025-01-30 NOTE — TELEPHONE ENCOUNTER
ATC called patient.  She reported she was given several soft cervical collars, 4in width was too big, but she does have a 2.5in width that fits well.  She needs two additional collars to get her through her first 6 weeks of recovery.  Patient lives in [place], [place].  ATC offered to place order and fax to [facility] in [place], [place].  Patient was agreeable to this and is familiar with that location.  Order faxed today.    James Robles MS, ATC, LAT, [facility]  Dr. Cortes Otero

## 2025-01-30 NOTE — TELEPHONE ENCOUNTER
[facility] Call Center    Phone Message    May a detailed message be left on voicemail: yes or send a [facility] message     Reason for Call: Other: pt calling as the 2 neck braces that she was sent home with are too big. She wonders what to do?     Action Taken: Other: te    Travel Screening: Not Applicable     Date of Service:

## 2025-02-03 NOTE — UTILIZATION REVIEW
Admission Status; Secondary Review Determination    No action to be taken. Please contact me on my Email : [email or web address] if you have any questions.    As part of the [place] Utilization review plan, a self-audit is done on Medicare inpatient admission with less than 2 midnights stay. The 2014 IPPS Final Rule allows outpatient billing in the event that a hospital determines that an inpatient admission was not medically necessary under utilization review process.     (x) Outpatient status would be Appropriate- Short Stay- Post discharge review.    RATIONALE FOR DETERMINATION  Deann Shah is a 74 year old female with a history of CKD, HTN, anxiety, GERD, and cervical spinal stenosis with radiculopathy admitted following cervical 4 to 6 anterior cervical decompression and fusion on 1/24/2025.   Per coding guidelines, the CPT codes for the above procedure correspond to CPT codes 33306, 24691, 79727, 99619, which is outpatient  No acute perioperative events were noted warranting an inpatient level of care following the procedure         Patient was admitted and discharge after one night stay. Record was sent by  for a PA review. Based on the  severity of illness, intensity of service provided, expected LOS and risk for adverse outcome make the care appropriate for further outpatient/observation; however, doesn't meet criteria for hospital inpatient admission.       The information on this document is developed by the utilization review team in order for the business office to ensure compliance.  This only denotes the appropriateness of proper admission status and does not reflect the quality of care rendered.       The definitions of Inpatient Status and Observation Status used in making the determination above are those provided in the CMS Coverage Manual, Chapter 1 and Chapter 6, section 70.4.     Please cont me if you want to discuss further about this admission episode.      Azra Mercado MD, FACP,  JULIETA  Medical Director - Utilization management  Staff Hospitalist  [facility]    Pager: [phone]

## 2025-02-10 ENCOUNTER — TELEPHONE (OUTPATIENT)
Dept: ORTHOPEDICS | Facility: CLINIC | Age: 75
End: 2025-02-10
Payer: MEDICARE

## 2025-02-10 NOTE — TELEPHONE ENCOUNTER
RN called patient to assess her condition. Patient had ACDF surgery on 01.24.25 with Dr. Blair. She has a red spot on her incision that is the size of a pea, with some drainage on her neck collar. Patient previously didn't have a barrier between incision and collar and that caused irritation. She doesn't know the color of the drainage as it's on the neck collar. She is wondering if we need to send an antibiotic for this spot. No systemic symptoms.    Asked if patient would be able to send a [employer] message in with a photo and she unfortunately does not know how to send photos on [facility].     Alicia Garber RN

## 2025-02-10 NOTE — TELEPHONE ENCOUNTER
Other: Patient had surgery on 1/24/25 and is now experiencing some pain at her incision site and is requesting a call back to learn if she can put lidocaine on it or something else?     Patient is not sure if the incision is infected.    Please call to discuss.    Patient is not exhibiting any other red flag symptoms.    Could we send this information to you in [facility] or would you prefer to receive a phone call?:   Patient would prefer a phone call   Okay to leave a detailed message?: Yes at Home number on file [phone] (home)

## 2025-02-10 NOTE — TELEPHONE ENCOUNTER
RN attempted to reach out to patient to discuss condition and recommendations. Patients mailbox is full and will send patients recommendations via [employer].     Alicia Garber RN

## 2025-02-11 DIAGNOSIS — I10 PRIMARY HYPERTENSION: ICD-10-CM

## 2025-02-11 DIAGNOSIS — F41.1 GAD (GENERALIZED ANXIETY DISORDER): ICD-10-CM

## 2025-02-11 DIAGNOSIS — I10 HTN (HYPERTENSION): ICD-10-CM

## 2025-02-11 DIAGNOSIS — Z98.1 S/P CERVICAL SPINAL FUSION: ICD-10-CM

## 2025-02-11 DIAGNOSIS — K21.9 CHRONIC GERD: ICD-10-CM

## 2025-02-11 DIAGNOSIS — M62.838 MUSCLE SPASM: ICD-10-CM

## 2025-02-11 DIAGNOSIS — Z79.2 PROPHYLACTIC ANTIBIOTIC: ICD-10-CM

## 2025-02-11 DIAGNOSIS — M16.11 PRIMARY OSTEOARTHRITIS OF RIGHT HIP: ICD-10-CM

## 2025-02-11 DIAGNOSIS — F51.01 PRIMARY INSOMNIA: ICD-10-CM

## 2025-02-11 DIAGNOSIS — F32.1 MAJOR DEPRESSIVE DISORDER, SINGLE EPISODE, MODERATE (H): ICD-10-CM

## 2025-02-11 RX ORDER — ACETAMINOPHEN 325 MG/1
650 TABLET ORAL EVERY 6 HOURS PRN
Qty: 100 TABLET | Refills: 2 | Status: SHIPPED | OUTPATIENT
Start: 2025-02-11 | End: 2025-02-11

## 2025-02-11 RX ORDER — ACETAMINOPHEN 325 MG/1
TABLET ORAL
Qty: 30 TABLET | Refills: 0 | OUTPATIENT
Start: 2025-02-11

## 2025-02-11 RX ORDER — TRAZODONE HYDROCHLORIDE 50 MG/1
50 TABLET ORAL
Qty: 90 TABLET | Refills: 3 | Status: CANCELLED | OUTPATIENT
Start: 2025-02-11

## 2025-02-11 NOTE — TELEPHONE ENCOUNTER
Patient has new insurance and all Rx's must now go to   [facility] - [place], [place] - [address] [phone]     Patient would like all her scripts transferred there at once. She does not want to keep calling back when refills are due and refill.

## 2025-02-12 DIAGNOSIS — F51.01 PRIMARY INSOMNIA: ICD-10-CM

## 2025-02-12 RX ORDER — OMEPRAZOLE 20 MG/1
20 CAPSULE, DELAYED RELEASE ORAL DAILY
Qty: 90 CAPSULE | Refills: 1 | Status: SHIPPED | OUTPATIENT
Start: 2025-02-12

## 2025-02-12 RX ORDER — ACETAMINOPHEN 325 MG/1
650 TABLET ORAL EVERY 6 HOURS PRN
Qty: 100 TABLET | Refills: 2 | Status: SHIPPED | OUTPATIENT
Start: 2025-02-12

## 2025-02-12 RX ORDER — AMOXICILLIN 500 MG/1
2000 CAPSULE ORAL
Qty: 16 CAPSULE | Refills: 1 | Status: SHIPPED | OUTPATIENT
Start: 2025-02-12

## 2025-02-12 RX ORDER — TRAZODONE HYDROCHLORIDE 50 MG/1
50 TABLET ORAL
Qty: 90 TABLET | Refills: 3 | Status: SHIPPED | OUTPATIENT
Start: 2025-02-12 | End: 2025-02-12

## 2025-02-12 RX ORDER — AMLODIPINE BESYLATE 2.5 MG/1
2.5 TABLET ORAL DAILY
Qty: 90 TABLET | Refills: 3 | Status: SHIPPED | OUTPATIENT
Start: 2025-02-12

## 2025-02-12 RX ORDER — HYDROXYZINE HYDROCHLORIDE 10 MG/1
10 TABLET, FILM COATED ORAL EVERY 6 HOURS PRN
Qty: 30 TABLET | Refills: 0 | Status: SHIPPED | OUTPATIENT
Start: 2025-02-12

## 2025-02-12 RX ORDER — CHLORTHALIDONE 25 MG/1
25 TABLET ORAL DAILY
Qty: 90 TABLET | Refills: 3 | Status: SHIPPED | OUTPATIENT
Start: 2025-02-12

## 2025-02-12 RX ORDER — ESCITALOPRAM OXALATE 20 MG/1
20 TABLET ORAL DAILY
Qty: 90 TABLET | Refills: 4 | Status: SHIPPED | OUTPATIENT
Start: 2025-02-12

## 2025-02-12 RX ORDER — TRAZODONE HYDROCHLORIDE 50 MG/1
50 TABLET ORAL
Qty: 90 TABLET | Refills: 3 | Status: SHIPPED | OUTPATIENT
Start: 2025-02-12

## 2025-02-12 RX ORDER — LOSARTAN POTASSIUM 100 MG/1
100 TABLET ORAL DAILY
Qty: 90 TABLET | Refills: 2 | Status: SHIPPED | OUTPATIENT
Start: 2025-02-12

## 2025-02-13 ENCOUNTER — TELEPHONE (OUTPATIENT)
Dept: FAMILY MEDICINE | Facility: CLINIC | Age: 75
End: 2025-02-13
Payer: MEDICARE

## 2025-02-13 NOTE — TELEPHONE ENCOUNTER
S-(situation): Patient requesting appointment for surgical site.    B-(background): Notes cervical surgical site seems to be itching and red. A couple drops of blood noted.     A-(assessment): Denies fever, chills.    R-(recommendations): RN scheduled patient for appointment tomorrow in clinic for assessment.

## 2025-03-06 DIAGNOSIS — Z98.1 S/P CERVICAL SPINAL FUSION: Primary | ICD-10-CM

## 2025-03-11 ENCOUNTER — ANCILLARY PROCEDURE (OUTPATIENT)
Dept: GENERAL RADIOLOGY | Facility: CLINIC | Age: 75
End: 2025-03-11
Attending: ORTHOPAEDIC SURGERY
Payer: COMMERCIAL

## 2025-03-11 ENCOUNTER — OFFICE VISIT (OUTPATIENT)
Dept: ORTHOPEDICS | Facility: CLINIC | Age: 75
End: 2025-03-11
Payer: COMMERCIAL

## 2025-03-11 DIAGNOSIS — M25.511 CHRONIC RIGHT SHOULDER PAIN: ICD-10-CM

## 2025-03-11 DIAGNOSIS — Z98.1 S/P CERVICAL SPINAL FUSION: Primary | ICD-10-CM

## 2025-03-11 DIAGNOSIS — Z98.890 HISTORY OF ARTHROSCOPY OF RIGHT SHOULDER: ICD-10-CM

## 2025-03-11 DIAGNOSIS — G89.29 CHRONIC RIGHT SHOULDER PAIN: ICD-10-CM

## 2025-03-11 DIAGNOSIS — Z98.1 S/P CERVICAL SPINAL FUSION: ICD-10-CM

## 2025-03-11 PROCEDURE — 99024 POSTOP FOLLOW-UP VISIT: CPT | Mod: 25 | Performed by: ORTHOPAEDIC SURGERY

## 2025-03-11 PROCEDURE — 20610 DRAIN/INJ JOINT/BURSA W/O US: CPT | Mod: 58 | Performed by: ORTHOPAEDIC SURGERY

## 2025-03-11 PROCEDURE — 72050 X-RAY EXAM NECK SPINE 4/5VWS: CPT | Performed by: RADIOLOGY

## 2025-03-11 RX ADMIN — LIDOCAINE HYDROCHLORIDE 5 ML: 10 INJECTION, SOLUTION EPIDURAL; INFILTRATION; INTRACAUDAL; PERINEURAL at 14:45

## 2025-03-11 RX ADMIN — TRIAMCINOLONE ACETONIDE 20 MG: 40 INJECTION, SUSPENSION INTRA-ARTICULAR; INTRAMUSCULAR at 14:45
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
## 2025-03-11 NOTE — PROGRESS NOTES
Medium Joint Injection/Arthrocentesis    Date/Time: 3/11/2025 2:45 PM    Performed by: Cortes Blair MD  Authorized by: Cortes Blair MD    Indications:  Pain  Needle Size:  25 G  Guidance: surface landmarks    Approach:  Posterior  Location:  Shoulder  Location comment:  Subacromial  Medications:  20 mg triamcinolone 40 MG/ML; 5 mL lidocaine (PF) 1 %  Outcome:  Tolerated well, no immediate complications  Procedure discussed: discussed risks, benefits, and alternatives    Consent Given by:  Patient  Timeout: timeout called immediately prior to procedure    Prep: patient was prepped and draped in usual sterile fashion        [facility] [address]  [address] [facility] [id number] [phone]  Dept: [phone]  ______________________________________________________________________________    Patient: Deann Shah   : 1950   MRN: [id number]   2025    INVASIVE PROCEDURE SAFETY CHECKLIST    Date: 3/11/2025   Procedure: Right subacromial bursa steroid injection  Patient Name: Deann Shah  MRN: [id number]  YOB: 1950    Action: Complete sections as appropriate. Any discrepancy results in a HARD COPY until resolved.     PRE PROCEDURE:  Patient ID verified with 2 identifiers (name and  or MRN): Yes  Procedure and site verified with patient/designee (when able): Yes  Accurate consent documentation in medical record: Yes  H&P (or appropriate assessment) documented in medical record: Yes  H&P must be up to 20 days prior to procedure and updates within 24 hours of procedure as applicable: Yes  Relevant diagnostic and radiology test results appropriately labeled and displayed as applicable: Yes  Procedure site(s) marked with provider initials: NA    TIMEOUT:  Time-Out performed immediately prior to starting procedure, including verbal and active participation of all team members addressing the  following:Yes  * Correct patient identify  * Confirmed that the correct side and site are marked  * An accurate procedure consent form  * Agreement on the procedure to be done  * Correct patient position  * Relevant images and results are properly labeled and appropriately displayed  * The need to administer antibiotics or fluids for irrigation purposes during the procedure as applicable   * Safety precautions based on patient history or medication use    DURING PROCEDURE: Verification of correct person, site, and procedures any time the responsibility for care of the patient is transferred to another member of the care team.       The following medications were given:         Prior to injection, verified patient identity using patient's name and date of birth.  Due to injection administration, patient instructed to remain in clinic for 15 minutes  afterwards, and to report any adverse reaction to me immediately.    Bursa injection was performed.    Medication Name: Kenalog 40mg/mL NDC [address]-8  Drug Amount Wasted:  Yes: .5 mg/ml   Vial/Syringe: Single dose vial  Expiration Date:  9/1/26    Medication Name: Lidocaine 1% NDC 41604-066-60  Drug Amount Wasted:  None.  Vial/Syringe: Single dose vial  Expiration Date:  7/1/28    Scribed by James Robles ATC for Dr. Blair on March 11, 2025 at 2:50pm based on the provider's statements to me.     James Robles ATC
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
## 2025-03-11 NOTE — NURSING NOTE
Reason For Visit:   Chief Complaint   Patient presents with    RECHECK     6 week post op, DOS 1/24/25           Primary MD: Blossom Ramirez  Ref. MD: established     ?  No  Date of surgery: 01/24/25  Type of surgery: Cervical 4 to 6 Anterior Cervical Decompression and Fusion with Medtronic Plate and Screws, interbody device, use of Fluoroscopy, Microscope, and Ashvin Bone Material and Local Autograft  Smoker: No  Request smoking cessation information: No    Pain Assessment  Patient Currently in Pain: Yes  Patient's Stated Pain Goal: 8    Oswestry (LUIS) Questionnaire        12/16/2024     3:46 PM   OSWESTRY DISABILITY INDEX   Count 10    Sum 28    Oswestry Score (%) 56 %        Patient-reported            Neck Disability Index (NDI) Questionnaire        12/16/2024     3:50 PM   Neck Disability Index (NDI)   Neck Disability Index: Count 10   NDI: Total Score = SUM (points for all 10 findings) 29   Neck Disability in Percent = (Total Score) / 50 * 100 58 (%)              Visual Analog Pain Scale  Back Pain Scale 0-10: 8  Right leg pain: 0  Left leg pain: 0  Neck Pain Scale 0-10: 8  Right arm pain: 5  Left arm pain: 0            Raina Mcconnell

## 2025-03-12 ENCOUNTER — PATIENT OUTREACH (OUTPATIENT)
Dept: CARE COORDINATION | Facility: CLINIC | Age: 75
End: 2025-03-12
Payer: COMMERCIAL

## 2025-03-12 RX ORDER — TRIAMCINOLONE ACETONIDE 40 MG/ML
20 INJECTION, SUSPENSION INTRA-ARTICULAR; INTRAMUSCULAR
Status: COMPLETED | OUTPATIENT
Start: 2025-03-11 | End: 2025-03-11

## 2025-03-12 RX ORDER — LIDOCAINE HYDROCHLORIDE 10 MG/ML
5 INJECTION, SOLUTION EPIDURAL; INFILTRATION; INTRACAUDAL; PERINEURAL
Status: COMPLETED | OUTPATIENT
Start: 2025-03-11 | End: 2025-03-11

## 2025-03-19 DIAGNOSIS — M16.11 PRIMARY OSTEOARTHRITIS OF RIGHT HIP: ICD-10-CM

## 2025-03-19 RX ORDER — HYDROXYZINE HYDROCHLORIDE 10 MG/1
TABLET, FILM COATED ORAL
Qty: 30 TABLET | Refills: 0 | Status: SHIPPED | OUTPATIENT
Start: 2025-03-19

## 2025-03-24 DIAGNOSIS — F32.1 MAJOR DEPRESSIVE DISORDER, SINGLE EPISODE, MODERATE (H): ICD-10-CM

## 2025-03-24 DIAGNOSIS — K21.9 CHRONIC GERD: ICD-10-CM

## 2025-03-24 DIAGNOSIS — F41.1 GAD (GENERALIZED ANXIETY DISORDER): ICD-10-CM

## 2025-03-24 DIAGNOSIS — I10 PRIMARY HYPERTENSION: ICD-10-CM

## 2025-03-24 RX ORDER — CHLORTHALIDONE 25 MG/1
25 TABLET ORAL DAILY
Qty: 100 TABLET | Refills: 3 | Status: SHIPPED | OUTPATIENT
Start: 2025-03-24

## 2025-03-24 RX ORDER — OMEPRAZOLE 20 MG/1
20 CAPSULE, DELAYED RELEASE ORAL DAILY
Qty: 100 CAPSULE | Refills: 1 | Status: SHIPPED | OUTPATIENT
Start: 2025-03-24

## 2025-03-24 RX ORDER — LOSARTAN POTASSIUM 100 MG/1
100 TABLET ORAL DAILY
Qty: 100 TABLET | Refills: 2 | Status: SHIPPED | OUTPATIENT
Start: 2025-03-24

## 2025-03-24 RX ORDER — AMLODIPINE BESYLATE 2.5 MG/1
2.5 TABLET ORAL DAILY
Qty: 100 TABLET | Refills: 3 | Status: SHIPPED | OUTPATIENT
Start: 2025-03-24

## 2025-03-24 RX ORDER — ESCITALOPRAM OXALATE 20 MG/1
20 TABLET ORAL DAILY
Qty: 100 TABLET | Refills: 4 | Status: SHIPPED | OUTPATIENT
Start: 2025-03-24

## 2025-04-01 ENCOUNTER — TELEPHONE (OUTPATIENT)
Dept: ORTHOPEDICS | Facility: CLINIC | Age: 75
End: 2025-04-01
Payer: COMMERCIAL

## 2025-04-01 NOTE — TELEPHONE ENCOUNTER
[facility] called in regard to:    Pt had her imaging done at [facility].    Pt's imaging results were supposed to go to Cortes Blair MD through [facility], but were sent to Cortes Blair MD at [facility] (different MD, same name).     Hanna, or this team, needs to request pt's imaging results through [facility]. Thank you.

## 2025-04-02 NOTE — TELEPHONE ENCOUNTER
Patient confirmed scheduled appointment:  Date: 4/30/25  Time: 12:20pm  Visit type: New Shoulder  Provider: Dr. Mcintosh  [place] [facility] [place]

## 2025-04-03 NOTE — TELEPHONE ENCOUNTER
Action April 3, 2025 12:57 PM MT   Action Taken Sent a request for imaging from [facility].        DIAGNOSIS:   Chronic right shoulder pain [M25.511, G89.29]  History of arthroscopy of right shoulder [Z98.890]     APPOINTMENT DATE: 04/30/2025   NOTES STATUS DETAILS   OFFICE NOTE from referring provider Internal 03/11/2025 - Cortes Blair MD - [facility]  Orthopaedic Surgery   OFFICE NOTE from other specialist Care Everywhere 11/18/2021 - Yandel Daniels DO - [facility]    04/18/2019 - Zac Garg MD - [facility]   OPERATIVE REPORT Internal  Care Everywhere 01/24/2025 - Cervical 4 to 6 Anterior Cervical Decompression and Fusion with Medtronic Plate and Screws, interbody device, use of Fluoroscopy, Microscope, and [place] Bone Material and Local Autograft.    06/29/2016 - Right shoulder mini open distal clavicle excision, ganglion cyst removal.    N/A:  2006- Right Rotator Cuff Surgery   MRI In process [facility]:  11/25/2024, 03/22/2019 - C Spine  05/26/2020 - RT Shoulder Arthrogram  06/01/2016 - RT Shoulder    NUC MED BONE SCAN In process [facility]:  08/11/2022 - Whole Body   CT SCAN In process Internal    [facility]:  08/01/2019 - C Spine   XRAYS (IMAGES & REPORTS) In process Internal    [facility]:  05/26/2020 - RT Shoulder Arthrogram   03/16/2020 - RT Shoulder  05/25/2016 - RT Shoulder

## 2025-04-30 ENCOUNTER — PRE VISIT (OUTPATIENT)
Dept: ORTHOPEDICS | Facility: CLINIC | Age: 75
End: 2025-04-30

## 2025-04-30 ENCOUNTER — ANCILLARY PROCEDURE (OUTPATIENT)
Dept: GENERAL RADIOLOGY | Facility: CLINIC | Age: 75
End: 2025-04-30
Attending: ORTHOPAEDIC SURGERY
Payer: COMMERCIAL

## 2025-04-30 ENCOUNTER — OFFICE VISIT (OUTPATIENT)
Dept: ORTHOPEDICS | Facility: CLINIC | Age: 75
End: 2025-04-30
Payer: COMMERCIAL

## 2025-04-30 VITALS — WEIGHT: 151 LBS | BODY MASS INDEX: 28.51 KG/M2 | HEIGHT: 61 IN

## 2025-04-30 DIAGNOSIS — Z98.890 HISTORY OF ARTHROSCOPY OF RIGHT SHOULDER: ICD-10-CM

## 2025-04-30 DIAGNOSIS — G89.29 CHRONIC RIGHT SHOULDER PAIN: ICD-10-CM

## 2025-04-30 DIAGNOSIS — M25.511 CHRONIC RIGHT SHOULDER PAIN: ICD-10-CM

## 2025-04-30 DIAGNOSIS — M19.011 GLENOHUMERAL ARTHRITIS, RIGHT: Primary | ICD-10-CM

## 2025-04-30 PROCEDURE — 73030 X-RAY EXAM OF SHOULDER: CPT | Mod: RT | Performed by: RADIOLOGY
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
## 2025-05-01 ENCOUNTER — PATIENT OUTREACH (OUTPATIENT)
Dept: CARE COORDINATION | Facility: CLINIC | Age: 75
End: 2025-05-01
Payer: COMMERCIAL

## 2025-05-05 ENCOUNTER — TELEPHONE (OUTPATIENT)
Dept: ORTHOPEDICS | Facility: CLINIC | Age: 75
End: 2025-05-05
Payer: COMMERCIAL

## 2025-05-05 ENCOUNTER — PATIENT OUTREACH (OUTPATIENT)
Dept: CARE COORDINATION | Facility: CLINIC | Age: 75
End: 2025-05-05
Payer: COMMERCIAL

## 2025-05-05 NOTE — TELEPHONE ENCOUNTER
Other: Patient would like the shoulder referral sent to [facility] in [place], [place].     Could we send this information to you in [facility] or would you prefer to receive a phone call?:   Patient would prefer a phone call   Okay to leave a detailed message?: Yes at Cell number on file:    Telephone Information:   Mobile [phone]

## 2025-05-06 NOTE — TELEPHONE ENCOUNTER
If this is for a 2nd opinion, then this needs to come from Pt's primary care provider. Pt may also need to sign an JUDE to get her records to [place].     If Pt needs a referral placed in order for her to get the nerve block/ablation that was discussed at [facility], then we can send a referral for this.     Max Limon, RNCC

## 2025-05-06 NOTE — TELEPHONE ENCOUNTER
Called the patient to have clarification on what the patient was wanting to be referred to [facility] in [place]. She clarified it was the injection that needed to be faxed. I confirmed it was to the [facility] in [facility] and she stated she has the injection scheduled but they still need the referral.    The referral was faxed at [phone].    Luz GLASS

## 2025-06-23 ENCOUNTER — TRANSFERRED RECORDS (OUTPATIENT)
Dept: HEALTH INFORMATION MANAGEMENT | Facility: CLINIC | Age: 75
End: 2025-06-23
Payer: COMMERCIAL

## 2025-07-09 DIAGNOSIS — F41.1 GAD (GENERALIZED ANXIETY DISORDER): Primary | ICD-10-CM

## 2025-07-10 RX ORDER — HYDROXYZINE HYDROCHLORIDE 10 MG/1
TABLET, FILM COATED ORAL
Qty: 30 TABLET | Refills: 0 | Status: SHIPPED | OUTPATIENT
Start: 2025-07-10

## 2025-08-09 SDOH — HEALTH STABILITY: PHYSICAL HEALTH: ON AVERAGE, HOW MANY MINUTES DO YOU ENGAGE IN EXERCISE AT THIS LEVEL?: 20 MIN

## 2025-08-09 SDOH — HEALTH STABILITY: PHYSICAL HEALTH: ON AVERAGE, HOW MANY DAYS PER WEEK DO YOU ENGAGE IN MODERATE TO STRENUOUS EXERCISE (LIKE A BRISK WALK)?: 0 DAYS

## 2025-08-09 ASSESSMENT — SOCIAL DETERMINANTS OF HEALTH (SDOH): HOW OFTEN DO YOU GET TOGETHER WITH FRIENDS OR RELATIVES?: PATIENT DECLINED

## 2025-08-12 ASSESSMENT — PATIENT HEALTH QUESTIONNAIRE - PHQ9
SUM OF ALL RESPONSES TO PHQ QUESTIONS 1-9: 9
10. IF YOU CHECKED OFF ANY PROBLEMS, HOW DIFFICULT HAVE THESE PROBLEMS MADE IT FOR YOU TO DO YOUR WORK, TAKE CARE OF THINGS AT HOME, OR GET ALONG WITH OTHER PEOPLE: SOMEWHAT DIFFICULT
SUM OF ALL RESPONSES TO PHQ QUESTIONS 1-9: 9

## 2025-08-13 ENCOUNTER — OFFICE VISIT (OUTPATIENT)
Dept: FAMILY MEDICINE | Facility: CLINIC | Age: 75
End: 2025-08-13
Attending: FAMILY MEDICINE
Payer: COMMERCIAL

## 2025-08-13 VITALS
HEART RATE: 70 BPM | SYSTOLIC BLOOD PRESSURE: 118 MMHG | OXYGEN SATURATION: 98 % | DIASTOLIC BLOOD PRESSURE: 64 MMHG | TEMPERATURE: 97.8 F | WEIGHT: 151.8 LBS | HEIGHT: 61 IN | BODY MASS INDEX: 28.66 KG/M2 | RESPIRATION RATE: 12 BRPM

## 2025-08-13 DIAGNOSIS — F32.1 MAJOR DEPRESSIVE DISORDER, SINGLE EPISODE, MODERATE (H): ICD-10-CM

## 2025-08-13 DIAGNOSIS — I10 PRIMARY HYPERTENSION: ICD-10-CM

## 2025-08-13 DIAGNOSIS — R41.3 MEMORY CHANGE: ICD-10-CM

## 2025-08-13 DIAGNOSIS — Z00.00 ENCOUNTER FOR MEDICARE ANNUAL WELLNESS EXAM: Primary | ICD-10-CM

## 2025-08-13 DIAGNOSIS — G95.9 CERVICAL MYELOPATHY (H): ICD-10-CM

## 2025-08-13 LAB
ALBUMIN UR-MCNC: 30 MG/DL
APPEARANCE UR: CLEAR
BACTERIA #/AREA URNS HPF: ABNORMAL /HPF
BASOPHILS # BLD AUTO: <0.04 10E3/UL (ref 0–0.2)
BASOPHILS NFR BLD AUTO: 0.4 %
BILIRUB UR QL STRIP: ABNORMAL
COLOR UR AUTO: YELLOW
EOSINOPHIL # BLD AUTO: 0.05 10E3/UL (ref 0–0.7)
EOSINOPHIL NFR BLD AUTO: 0.7 %
ERYTHROCYTE [DISTWIDTH] IN BLOOD BY AUTOMATED COUNT: 13.7 % (ref 10–15)
GLUCOSE UR STRIP-MCNC: NEGATIVE MG/DL
HCT VFR BLD AUTO: 36.4 % (ref 35–47)
HGB BLD-MCNC: 12.2 G/DL (ref 11.7–15.7)
HGB UR QL STRIP: NEGATIVE
IMM GRANULOCYTES # BLD: <0.04 10E3/UL
IMM GRANULOCYTES NFR BLD: 0.1 %
KETONES UR STRIP-MCNC: ABNORMAL MG/DL
LEUKOCYTE ESTERASE UR QL STRIP: ABNORMAL
LYMPHOCYTES # BLD AUTO: 1.97 10E3/UL (ref 0.8–5.3)
LYMPHOCYTES NFR BLD AUTO: 28.8 %
MCH RBC QN AUTO: 30.8 PG (ref 26.5–33)
MCHC RBC AUTO-ENTMCNC: 33.5 G/DL (ref 31.5–36.5)
MCV RBC AUTO: 91.9 FL (ref 78–100)
MONOCYTES # BLD AUTO: 0.51 10E3/UL (ref 0–1.3)
MONOCYTES NFR BLD AUTO: 7.4 %
NEUTROPHILS # BLD AUTO: 4.28 10E3/UL (ref 1.6–8.3)
NEUTROPHILS NFR BLD AUTO: 62.6 %
NITRATE UR QL: NEGATIVE
PH UR STRIP: 7.5 [PH] (ref 5–7)
PLATELET # BLD AUTO: 287 10E3/UL (ref 150–450)
RBC # BLD AUTO: 3.96 10E6/UL (ref 3.8–5.2)
RBC #/AREA URNS AUTO: ABNORMAL /HPF
SP GR UR STRIP: 1.01 (ref 1–1.03)
SQUAMOUS #/AREA URNS AUTO: ABNORMAL /LPF
TRANS CELLS #/AREA URNS HPF: ABNORMAL /HPF
UROBILINOGEN UR STRIP-ACNC: 0.2 E.U./DL
WBC # BLD AUTO: 6.85 10E3/UL (ref 4–11)
WBC #/AREA URNS AUTO: ABNORMAL /HPF

## 2025-08-13 PROCEDURE — 85025 COMPLETE CBC W/AUTO DIFF WBC: CPT | Mod: QW | Performed by: FAMILY MEDICINE

## 2025-08-13 PROCEDURE — 36415 COLL VENOUS BLD VENIPUNCTURE: CPT | Performed by: FAMILY MEDICINE

## 2025-08-13 PROCEDURE — 84443 ASSAY THYROID STIM HORMONE: CPT | Performed by: FAMILY MEDICINE

## 2025-08-13 PROCEDURE — 82607 VITAMIN B-12: CPT | Performed by: FAMILY MEDICINE

## 2025-08-13 PROCEDURE — 80053 COMPREHEN METABOLIC PANEL: CPT | Performed by: FAMILY MEDICINE

## 2025-08-13 PROCEDURE — 81001 URINALYSIS AUTO W/SCOPE: CPT | Performed by: FAMILY MEDICINE

## 2025-08-14 LAB
ALBUMIN SERPL BCG-MCNC: 3.9 G/DL (ref 3.5–5.2)
ALP SERPL-CCNC: 65 U/L (ref 40–150)
ALT SERPL W P-5'-P-CCNC: 9 U/L (ref 0–50)
ANION GAP SERPL CALCULATED.3IONS-SCNC: 11 MMOL/L (ref 7–15)
AST SERPL W P-5'-P-CCNC: 20 U/L (ref 0–45)
BILIRUB SERPL-MCNC: 0.4 MG/DL
BUN SERPL-MCNC: 22.3 MG/DL (ref 8–23)
CALCIUM SERPL-MCNC: 9.5 MG/DL (ref 8.8–10.4)
CHLORIDE SERPL-SCNC: 99 MMOL/L (ref 98–107)
CREAT SERPL-MCNC: 1.57 MG/DL (ref 0.51–0.95)
EGFRCR SERPLBLD CKD-EPI 2021: 34 ML/MIN/1.73M2
GLUCOSE SERPL-MCNC: 97 MG/DL (ref 70–99)
HCO3 SERPL-SCNC: 25 MMOL/L (ref 22–29)
POTASSIUM SERPL-SCNC: 4.2 MMOL/L (ref 3.4–5.3)
PROT SERPL-MCNC: 6.3 G/DL (ref 6.4–8.3)
SODIUM SERPL-SCNC: 135 MMOL/L (ref 135–145)
TSH SERPL DL<=0.005 MIU/L-ACNC: 1.13 UIU/ML (ref 0.3–4.2)
VIT B12 SERPL-MCNC: 1232 PG/ML (ref 232–1245)

## 2025-08-27 DIAGNOSIS — F41.1 GAD (GENERALIZED ANXIETY DISORDER): ICD-10-CM

## 2025-08-27 RX ORDER — HYDROXYZINE HYDROCHLORIDE 10 MG/1
TABLET, FILM COATED ORAL
Qty: 30 TABLET | Refills: 0 | Status: SHIPPED | OUTPATIENT
Start: 2025-08-27

## (undated) DEVICE — SUCTION IRR SYSTEM W/O TIP INTERPULSE HANDPIECE 0210-100-000

## (undated) DEVICE — DRAIN ROUND W/RESERV KIT JACKSON PRATT 10FR 400ML SU130-402D

## (undated) DEVICE — DRAPE C-ARM W/STRAPS 42X72" 07-CA104

## (undated) DEVICE — SU STRATAFIX PDS PLUS 2-0 SPIRAL CT-1 30CM SXPP1B410

## (undated) DEVICE — DRSG GAUZE 4X8" NON21842

## (undated) DEVICE — POSITIONER ARMBOARD FOAM 1PAIR LF FP-ARMB1

## (undated) DEVICE — SUCTION MANIFOLD NEPTUNE 2 SYS 4 PORT 0702-020-000

## (undated) DEVICE — RESTRAINT LIMB HOLDER ANKLE/WRIST FOAM W/QUICK RELEASE 2533

## (undated) DEVICE — ESU ELEC BLADE 6" COATED E1450-6

## (undated) DEVICE — SOL NACL 0.9% IRRIG 1000ML BOTTLE 2F7124

## (undated) DEVICE — SOL NACL 0.9% INJ 1000ML BAG 2B1324X

## (undated) DEVICE — DRSG TEGADERM 4X10" 1627

## (undated) DEVICE — IMM COLLAR CERVICAL MED UNIVERSAL 2.5X24" 79-83520

## (undated) DEVICE — PAD HIP POSITIONING MCGUIRE 707-CPM

## (undated) DEVICE — DRSG AQUACEL AG HYDROFIBER  3.5X10" 422605

## (undated) DEVICE — SU DERMABOND ADVANCED .7ML DNX12

## (undated) DEVICE — SUCTION SLEEVE NEPTUNE 2 165MM 0703-005-165

## (undated) DEVICE — PREP CHLORAPREP 26ML TINTED HI-LITE ORANGE 930815

## (undated) DEVICE — IOM SUPPLIES/CASE FEE

## (undated) DEVICE — A3 SUPPLIES- SEE NURSING INFO PAGE

## (undated) DEVICE — DRAPE IOBAN INCISE 23X17" 6650EZ

## (undated) DEVICE — POSITIONER HEAD DONUT FOAM 9" LF FP-HEAD9

## (undated) DEVICE — HOLDER LIMB VELCRO OR 0814-1533

## (undated) DEVICE — SU VICRYL 1 CTX 36" J371H

## (undated) DEVICE — SOL WATER IRRIG 1000ML BOTTLE 2F7114

## (undated) DEVICE — GLOVE BIOGEL PI SZ 8.0 40880

## (undated) DEVICE — TAPE DURAPORE 3" SILK 1538-3

## (undated) DEVICE — SUTURE MONOCRYL 3-0 18 PS2 UND MCP497G

## (undated) DEVICE — RX SURGIFLO HEMOSTATIC MATRIX W/THROMBIN 8ML 2994

## (undated) DEVICE — TOOL DISSECT MIDAS MR8 14CM MATCH HEAD 3MM MR8-14MH30

## (undated) DEVICE — IMPLANTABLE DEVICE: Type: IMPLANTABLE DEVICE | Site: SPINE CERVICAL | Status: NON-FUNCTIONAL

## (undated) DEVICE — DRAPE C-ARMOR 5 SIDED 5523

## (undated) DEVICE — GOWN IMPERVIOUS SPECIALTY XLG/XLONG 32474

## (undated) DEVICE — Device

## (undated) DEVICE — GLOVE BIOGEL PI INDICATOR 8.0 LF 41680

## (undated) DEVICE — SU ETHIBOND 5 V-37 4X30" MB66G

## (undated) DEVICE — LINEN TOWEL PACK X5 5464

## (undated) DEVICE — SPONGE COTTONOID NEURO 1/2"X1/2" 30-054

## (undated) DEVICE — CUSTOM PACK TOTAL HIP SOP5BTHHEA

## (undated) DEVICE — GOWN IMPERVIOUS BREATHABLE SMART XLG 89045

## (undated) DEVICE — PLATE GROUNDING ADULT W/CORD 9165L

## (undated) DEVICE — GLOVE BIOGEL PI ULTRATOUCH G SZ 7.5 42175

## (undated) DEVICE — SU ETHILON 3-0 PS-1 18" 1663H

## (undated) DEVICE — SUTURE VICRYL+ 2-0 CT-2 CR 8X18" VCP726D

## (undated) DEVICE — DRAPE MICROSCOPE MICRO-KOVER LEICA 48"X120" 09-MK651

## (undated) DEVICE — SUTURE VICRYL+ 0 27IN CT-1 UND VCP260H

## (undated) DEVICE — SU STRATAFIX PDS PLUS 1 CT-1 18" SXPP1A404

## (undated) DEVICE — SOL ISOPROPYL RUBBING ALCOHOL USP 70% 4OZ HDX-20 I0020

## (undated) DEVICE — GLOVE BIOGEL PI MICRO INDICATOR UNDERGLOVE SZ 8.0 48980

## (undated) DEVICE — BLADE SAGITTAL WIDE (SO-618) 2108-118

## (undated) DEVICE — GLOVE UNDER INDICATOR PI SZ 8.5 LF 41685

## (undated) DEVICE — TUBING SUCTION MED-VAC 7MMX20' N720A

## (undated) DEVICE — DRILL BIT MEDT ELITE 11MM SS 7080510

## (undated) DEVICE — DECANTER VIAL 2006S

## (undated) DEVICE — GLOVE BIOGEL PI SZ 8.5 40885

## (undated) DEVICE — LINEN BACK PACK 5440

## (undated) DEVICE — BONE CLEANING TIP INTERPULSE  0210-010-000

## (undated) RX ORDER — GLYCOPYRROLATE 0.2 MG/ML
INJECTION, SOLUTION INTRAMUSCULAR; INTRAVENOUS
Status: DISPENSED
Start: 2023-10-05

## (undated) RX ORDER — FENTANYL CITRATE 50 UG/ML
INJECTION, SOLUTION INTRAMUSCULAR; INTRAVENOUS
Status: DISPENSED
Start: 2023-10-05

## (undated) RX ORDER — LIDOCAINE HYDROCHLORIDE 10 MG/ML
INJECTION, SOLUTION EPIDURAL; INFILTRATION; INTRACAUDAL; PERINEURAL
Status: DISPENSED
Start: 2025-03-11

## (undated) RX ORDER — ONDANSETRON 2 MG/ML
INJECTION INTRAMUSCULAR; INTRAVENOUS
Status: DISPENSED
Start: 2023-10-05

## (undated) RX ORDER — HYDROMORPHONE HYDROCHLORIDE 1 MG/ML
INJECTION, SOLUTION INTRAMUSCULAR; INTRAVENOUS; SUBCUTANEOUS
Status: DISPENSED
Start: 2025-01-24

## (undated) RX ORDER — FENTANYL CITRATE 50 UG/ML
INJECTION, SOLUTION INTRAMUSCULAR; INTRAVENOUS
Status: DISPENSED
Start: 2025-01-24

## (undated) RX ORDER — LIDOCAINE HYDROCHLORIDE 10 MG/ML
INJECTION, SOLUTION EPIDURAL; INFILTRATION; INTRACAUDAL; PERINEURAL
Status: DISPENSED
Start: 2023-10-05

## (undated) RX ORDER — METHOCARBAMOL 750 MG/1
TABLET, FILM COATED ORAL
Status: DISPENSED
Start: 2025-01-24

## (undated) RX ORDER — PROPOFOL 10 MG/ML
INJECTION, EMULSION INTRAVENOUS
Status: DISPENSED
Start: 2023-10-05

## (undated) RX ORDER — TRIAMCINOLONE ACETONIDE 40 MG/ML
INJECTION, SUSPENSION INTRA-ARTICULAR; INTRAMUSCULAR
Status: DISPENSED
Start: 2025-03-11

## (undated) RX ORDER — VANCOMYCIN HYDROCHLORIDE 1 G/20ML
INJECTION, POWDER, LYOPHILIZED, FOR SOLUTION INTRAVENOUS
Status: DISPENSED
Start: 2025-01-24

## (undated) RX ORDER — GABAPENTIN 100 MG/1
CAPSULE ORAL
Status: DISPENSED
Start: 2025-01-24

## (undated) RX ORDER — DEXAMETHASONE SODIUM PHOSPHATE 10 MG/ML
INJECTION, EMULSION INTRAMUSCULAR; INTRAVENOUS
Status: DISPENSED
Start: 2023-10-05

## (undated) RX ORDER — CEFAZOLIN SODIUM/WATER 2 G/20 ML
SYRINGE (ML) INTRAVENOUS
Status: DISPENSED
Start: 2025-01-24